# Patient Record
Sex: MALE | Race: WHITE | NOT HISPANIC OR LATINO | Employment: OTHER | URBAN - NONMETROPOLITAN AREA
[De-identification: names, ages, dates, MRNs, and addresses within clinical notes are randomized per-mention and may not be internally consistent; named-entity substitution may affect disease eponyms.]

---

## 2017-01-05 ENCOUNTER — COMMUNICATION - GICH (OUTPATIENT)
Dept: FAMILY MEDICINE | Facility: OTHER | Age: 72
End: 2017-01-05

## 2017-01-05 DIAGNOSIS — M10.9 GOUT: ICD-10-CM

## 2017-01-19 ENCOUNTER — OFFICE VISIT - GICH (OUTPATIENT)
Dept: FAMILY MEDICINE | Facility: OTHER | Age: 72
End: 2017-01-19

## 2017-01-19 ENCOUNTER — HISTORY (OUTPATIENT)
Dept: FAMILY MEDICINE | Facility: OTHER | Age: 72
End: 2017-01-19

## 2017-01-19 DIAGNOSIS — I10 ESSENTIAL (PRIMARY) HYPERTENSION: ICD-10-CM

## 2017-01-19 DIAGNOSIS — E78.00 PURE HYPERCHOLESTEROLEMIA: ICD-10-CM

## 2017-01-19 LAB
ANION GAP - HISTORICAL: 10 (ref 5–18)
BUN SERPL-MCNC: 30 MG/DL (ref 7–25)
BUN/CREAT RATIO - HISTORICAL: 21
CALCIUM SERPL-MCNC: 9.3 MG/DL (ref 8.6–10.3)
CHLORIDE SERPLBLD-SCNC: 104 MMOL/L (ref 98–107)
CHOL/HDL RATIO - HISTORICAL: 4.36
CHOLESTEROL TOTAL: 144 MG/DL
CO2 SERPL-SCNC: 26 MMOL/L (ref 21–31)
CREAT SERPL-MCNC: 1.44 MG/DL (ref 0.7–1.3)
GFR IF NOT AFRICAN AMERICAN - HISTORICAL: 48 ML/MIN/1.73M2
GLUCOSE SERPL-MCNC: 107 MG/DL (ref 70–105)
HDLC SERPL-MCNC: 33 MG/DL (ref 23–92)
LDLC SERPL CALC-MCNC: 90 MG/DL
NON-HDL CHOLESTEROL - HISTORICAL: 111 MG/DL
PATIENT STATUS - HISTORICAL: NORMAL
POTASSIUM SERPL-SCNC: 4.2 MMOL/L (ref 3.5–5.1)
SODIUM SERPL-SCNC: 140 MMOL/L (ref 133–143)
TRIGL SERPL-MCNC: 105 MG/DL

## 2017-01-19 ASSESSMENT — ANXIETY QUESTIONNAIRES
GAD7 TOTAL SCORE: 0
6. BECOMING EASILY ANNOYED OR IRRITABLE: NOT AT ALL
5. BEING SO RESTLESS THAT IT IS HARD TO SIT STILL: NOT AT ALL
1. FEELING NERVOUS, ANXIOUS, OR ON EDGE: NOT AT ALL
3. WORRYING TOO MUCH ABOUT DIFFERENT THINGS: NOT AT ALL
2. NOT BEING ABLE TO STOP OR CONTROL WORRYING: NOT AT ALL
4. TROUBLE RELAXING: NOT AT ALL
7. FEELING AFRAID AS IF SOMETHING AWFUL MIGHT HAPPEN: NOT AT ALL

## 2017-01-19 ASSESSMENT — PATIENT HEALTH QUESTIONNAIRE - PHQ9: SUM OF ALL RESPONSES TO PHQ QUESTIONS 1-9: 0

## 2017-01-23 ENCOUNTER — AMBULATORY - GICH (OUTPATIENT)
Dept: FAMILY MEDICINE | Facility: OTHER | Age: 72
End: 2017-01-23

## 2017-01-24 ENCOUNTER — COMMUNICATION - GICH (OUTPATIENT)
Dept: FAMILY MEDICINE | Facility: OTHER | Age: 72
End: 2017-01-24

## 2017-01-24 DIAGNOSIS — I10 ESSENTIAL (PRIMARY) HYPERTENSION: ICD-10-CM

## 2017-02-08 ENCOUNTER — OFFICE VISIT - GICH (OUTPATIENT)
Dept: FAMILY MEDICINE | Facility: OTHER | Age: 72
End: 2017-02-08

## 2017-02-08 ENCOUNTER — HISTORY (OUTPATIENT)
Dept: FAMILY MEDICINE | Facility: OTHER | Age: 72
End: 2017-02-08

## 2017-02-08 DIAGNOSIS — I10 ESSENTIAL (PRIMARY) HYPERTENSION: ICD-10-CM

## 2017-02-08 DIAGNOSIS — R79.89 OTHER SPECIFIED ABNORMAL FINDINGS OF BLOOD CHEMISTRY: ICD-10-CM

## 2017-02-08 DIAGNOSIS — L57.0 ACTINIC KERATOSIS: ICD-10-CM
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
## 2017-02-08 LAB
CREAT SERPL-MCNC: 1.18 MG/DL (ref 0.7–1.3)
GFR IF NOT AFRICAN AMERICAN - HISTORICAL: >60 ML/MIN/1.73M2

## 2017-03-07 ENCOUNTER — COMMUNICATION - GICH (OUTPATIENT)
Dept: FAMILY MEDICINE | Facility: OTHER | Age: 72
End: 2017-03-07

## 2017-03-07 DIAGNOSIS — K21.9 GASTRO-ESOPHAGEAL REFLUX DISEASE WITHOUT ESOPHAGITIS: ICD-10-CM

## 2017-04-14 ENCOUNTER — COMMUNICATION - GICH (OUTPATIENT)
Dept: FAMILY MEDICINE | Facility: OTHER | Age: 72
End: 2017-04-14

## 2017-04-14 DIAGNOSIS — M10.9 GOUT: ICD-10-CM

## 2017-06-14 ENCOUNTER — COMMUNICATION - GICH (OUTPATIENT)
Dept: FAMILY MEDICINE | Facility: OTHER | Age: 72
End: 2017-06-14

## 2017-06-15 ENCOUNTER — HOSPITAL ENCOUNTER (OUTPATIENT)
Dept: RADIOLOGY | Facility: OTHER | Age: 72
End: 2017-06-15
Attending: FAMILY MEDICINE

## 2017-06-15 ENCOUNTER — OFFICE VISIT - GICH (OUTPATIENT)
Dept: FAMILY MEDICINE | Facility: OTHER | Age: 72
End: 2017-06-15

## 2017-06-15 ENCOUNTER — HISTORY (OUTPATIENT)
Dept: FAMILY MEDICINE | Facility: OTHER | Age: 72
End: 2017-06-15

## 2017-06-15 DIAGNOSIS — M17.0 PRIMARY OSTEOARTHRITIS OF BOTH KNEES: ICD-10-CM

## 2017-06-15 DIAGNOSIS — R07.89 OTHER CHEST PAIN: ICD-10-CM

## 2017-06-15 ASSESSMENT — PATIENT HEALTH QUESTIONNAIRE - PHQ9: SUM OF ALL RESPONSES TO PHQ QUESTIONS 1-9: 0

## 2017-07-28 ENCOUNTER — OFFICE VISIT - GICH (OUTPATIENT)
Dept: FAMILY MEDICINE | Facility: OTHER | Age: 72
End: 2017-07-28

## 2017-07-28 ENCOUNTER — HISTORY (OUTPATIENT)
Dept: FAMILY MEDICINE | Facility: OTHER | Age: 72
End: 2017-07-28

## 2017-07-28 DIAGNOSIS — L57.0 ACTINIC KERATOSIS: ICD-10-CM

## 2017-07-28 DIAGNOSIS — E78.00 PURE HYPERCHOLESTEROLEMIA: ICD-10-CM

## 2017-09-11 ENCOUNTER — AMBULATORY - GICH (OUTPATIENT)
Dept: SCHEDULING | Facility: OTHER | Age: 72
End: 2017-09-11

## 2017-12-11 ENCOUNTER — COMMUNICATION - GICH (OUTPATIENT)
Dept: FAMILY MEDICINE | Facility: OTHER | Age: 72
End: 2017-12-11

## 2017-12-11 DIAGNOSIS — K21.9 GASTRO-ESOPHAGEAL REFLUX DISEASE WITHOUT ESOPHAGITIS: ICD-10-CM

## 2017-12-27 NOTE — PROGRESS NOTES
"Patient Information     Patient Name MRN Sex Bryan Valdovinos [id number] Male 1945      Progress Notes by Abundio Abdi MD at 2017  8:30 AM     Author:  Abundio Abdi MD Service:  (none) Author Type:  Physician     Filed:  2017 10:09 AM Encounter Date:  2017 Status:  Signed     :  Abundio Abdi MD (Physician)            SUBJECTIVE:    Bryan Kearney is a 71 y.o. male who presents for rash and refill Zocor    HPI Comments: Patient arrives here for facial rash. He recently started his Efudex. And after a few days ago became quite red and inflamed on his face. He does have a history of solar an actinic keratosis. Was wondering if he should see the dermatologist again. Also needs refill of his Zocor      No Known Allergies and   Family History       Problem   Relation Age of Onset     Cancer  Father      Lymphoma       Cancer-colon  Brother        REVIEW OF SYSTEMS:  ROS    OBJECTIVE:  /84  Temp 97.8  F (36.6  C) (Temporal)  Ht 1.8 m (5' 10.87\")  Wt 109.9 kg (242 lb 3.2 oz)  BMI 33.91 kg/m2    EXAM:   Physical Exam   Constitutional: He is well-developed, well-nourished, and in no distress.   Skin:   Especially the right face is raw. Area about 2 cm x 2 cm present.       ASSESSMENT/PLAN:    ICD-10-CM    1. Actinic keratosis L57.0    2. HYPERCHOLESTEROLEMIA E78.00 simvastatin (ZOCOR) 20 mg tablet        Plan:  Patient advised we should hold off on the Efudex follow-up in a couple weeks once it's healed. Patient may need dermatology appointment. Refill Zocor.        "

## 2017-12-28 NOTE — PROGRESS NOTES
Patient Information     Patient Name MRN Sex Bryan Valdovinos [id number] Male 1945      Progress Notes by Abundio Abdi MD at 6/15/2017  3:30 PM     Author:  Abundio Abdi MD Service:  (none) Author Type:  Physician     Filed:  2017 11:10 AM Encounter Date:  6/15/2017 Status:  Signed     :  Abundio Abdi MD (Physician)            SUBJECTIVE:    Bryan Kearney is a 71 y.o. male who presents for right-sided chest pain    HPI Comments: Patient arrives here for right-sided chest pain. States is been going on for one month. He did use a few hydrocodone which helped. Riding a lot more bumping twisting breathing all makes it worse. States it seemed to start when he was on a tractor and was turning. Does not seem to be getting better. No fevers chills. No cough.      No Known Allergies,   Family History       Problem   Relation Age of Onset     Cancer-colon  Brother      Cancer  Father      Lymphoma     ,   Current Outpatient Prescriptions on File Prior to Visit       Medication  Sig Dispense Refill     allopurinol (ZYLOPRIM) 300 mg tablet Take 1 tablet by mouth once daily. Take with plenty of water 90 tablet 2     aspirin 325 mg tablet Take 325 mg by mouth once daily with a meal.       atenolol (TENORMIN) 50 mg tablet Take 1 tablet by mouth once daily. 90 tablet 3     fluorouracil 5% topical (EFUDEX) 5 % cream Apply  topically to affected area(s) 2 times daily. 40 g 0     hydroCHLOROthiazide (HCTZ) 25 mg tablet Take 1 tablet by mouth once daily. 90 tablet 3     ranitidine (ZANTAC) 150 mg tablet Take 1 tablet by mouth 2 times daily. 180 tablet 2     simvastatin (ZOCOR) 20 mg tablet TAKE ONE TABLET ORALLY ATBEDTIME 90 tablet 2     No current facility-administered medications on file prior to visit.    ,   Past Surgical History:      Procedure  Laterality Date     APPENDECTOMY       CAROTID ENDARDECTOMY      Left carotid endarterectomy secondary to high grade stenosis       CAROTID  ENDARDECTOMY      Bilateral endarterectomy        COLONOSCOPY SCREENING  06/04/2001    Next colonoscopy due in 2011.         ROTATOR CUFF REPAIR     ,   Social History        Substance Use Topics          Smoking status:   Current Every Day Smoker      Packs/day:  0.75      Years:  11.00      Smokeless tobacco:   Never Used      Alcohol use   0.6 oz/week     1 Standard drinks or equivalent per week        Comment: 1 per week      and   Social History        Substance Use Topics          Smoking status:   Current Every Day Smoker      Packs/day:  0.75      Years:  11.00      Smokeless tobacco:   Never Used      Alcohol use   0.6 oz/week     1 Standard drinks or equivalent per week        Comment: 1 per week         REVIEW OF SYSTEMS:  ROS    OBJECTIVE:  /68  Pulse (!) 48  Wt 109.4 kg (241 lb 3.2 oz)  BMI 33.66 kg/m2    EXAM:   Physical Exam   Constitutional: He is well-developed, well-nourished, and in no distress.   Eyes: Pupils are equal, round, and reactive to light.   Cardiovascular: Normal rate, regular rhythm and normal heart sounds.    Pulmonary/Chest: Effort normal and breath sounds normal. No respiratory distress. He has no wheezes. He has no rales. He exhibits no tenderness.       ASSESSMENT/PLAN:    ICD-10-CM    1. Other chest pain R07.89 XR CHEST 2 VIEWS PA AND LATERAL   2. Primary osteoarthritis of both knees M17.0 HYDROcodone-acetaminophen, 5-325 mg, (NORCO) per tablet        Plan:  Chest pain is likely pleuritic a costochondritis in nature. Prescription for hydrocodone. Chest x-ray was done and was unremarkable. Will get back to patient if radiology sees anything concerning.

## 2017-12-28 NOTE — TELEPHONE ENCOUNTER
"Patient Information     Patient Name MRN Bryan Jaimes [id number] Male 1945      Telephone Encounter by Roz Paula RN at 2017 12:59 PM     Author:  Roz Paula RN Service:  (none) Author Type:  NURS- Registered Nurse     Filed:  2017  1:15 PM Encounter Date:  2017 Status:  Signed     :  Roz Paula RN (NURS- Registered Nurse)            Patient calls today with right-sided chest pain that is bothersome when he is twisting his upper body or \"being jostled\" while doing activities such as riding the tractor. It has been present for about 1 month. He denies nausea, dizziness, diaphoresis, radiating pain, difficulty breathing, and fever. He states he notices a dull ache at rest and the actual bothersome pain lasts only a few seconds. He was advised to be seen today or tomorrow and was transferred to scheduling.    Reason for Disposition    [1] Chest pain(s) lasting a few seconds AND [2] persists > 3 days    Answer Assessment - Initial Assessment Questions  1. LOCATION: \"Where does it hurt?\"        Right chest  2. RADIATION: \"Does the pain go anywhere else?\" (e.g., into neck, jaw, arms, back)      no  3. ONSET: \"When did the chest pain begin?\" (Minutes, hours or days)       1 month ago  4. PATTERN \"Does the pain come and go, or has it been constant since it started?\"  \"Does it get worse with exertion?\"       Constant dull ache, but actual pain when \"twisting or jostling\" ie. Riding tractor  5. DURATION: \"How long does it last\" (e.g., seconds, minutes, hours)      Constant dull ache, but pains last only a few seconds  6. SEVERITY: \"How bad is the pain?\"  (e.g., Scale 1-10; mild, moderate, or severe)     - MILD (1-3): doesn't interfere with normal activities      - MODERATE (4-7): interferes with normal activities or awakens from sleep     - SEVERE (8-10): excruciating pain, unable to do any normal activities        5/10 at worst  7. CARDIAC RISK FACTORS: " "\"Do you have any history of heart problems or risk factors for heart disease?\" (e.g., prior heart attack, angina; high blood pressure, diabetes, being overweight, high cholesterol, smoking, or strong family history of heart disease)      HTN  8. PULMONARY RISK FACTORS: \"Do you have any history of lung disease?\"  (e.g., blood clots in lung, asthma, emphysema, birth control pills)      no  9. CAUSE: \"What do you think is causing the chest pain?\"      unknown  10. OTHER SYMPTOMS: \"Do you have any other symptoms?\" (e.g., dizziness, nausea, vomiting, sweating, fever, difficulty breathing, cough)        no  11. PREGNANCY: \"Is there any chance you are pregnant?\" \"When was your last menstrual period?\"        N/a male    Protocols used: ADULT CHEST PAIN-A-AH            "

## 2017-12-30 NOTE — NURSING NOTE
Patient Information     Patient Name MRN Bryan Jaimes [id number] Male 1945      Nursing Note by Amber Mirza at 2017  8:30 AM     Author:  Amber Mirza Service:  (none) Author Type:  (none)     Filed:  2017  8:22 AM Encounter Date:  2017 Status:  Signed     :  Amber Mirza            Patient presents to the clinic with a rash on his face, arms and legs,  He's got a history of skin cancer and has been using a cream, he thinks he might have used too much or is having a reaction.  Amber Mirza LPN....................2017 8:13 AM

## 2018-01-02 NOTE — TELEPHONE ENCOUNTER
Patient Information     Patient Name MRN Sex Bryan Valdovinos [id number] Male 1945      Telephone Encounter by Roz Paula RN at 2017  4:04 PM     Author:  Roz Paula RN Service:  (none) Author Type:  NURS- Registered Nurse     Filed:  2017  4:06 PM Encounter Date:  2017 Status:  Signed     :  Roz Paula RN (NURS- Registered Nurse)            GOUT    Office visit in the past 12 months or per provider note.    Last visit with ZOE FONTANEZ was on: 2016 in [facility]  Next visit with ZOE FONTANEZ is on: No future appointment listed with this provider  Next visit with Family Practice is on: No future appointment listed in this department    Max refill for 12 months from last office visit or per provider note.    Patient is due for medication management appointment. Limited refill provided at this time and letter sent for reminder to patient. Prescription refilled per RN Medication Refill Policy.................... Roz Paula RN ....................  2017   4:05 PM

## 2018-01-03 NOTE — NURSING NOTE
Patient Information     Patient Name MRN Bryan Jaimes [id number] Male 1945      Nursing Note by Brigette Garinca at 2017  8:45 AM     Author:  Brigette Garnica Service:  (none) Author Type:  (none)     Filed:  2017  9:02 AM Encounter Date:  2017 Status:  Signed     :  Brigette Garnica            Patient here for medication review and refills on medications.  No concerns today.  Last eye exam longer than three years and dental exam 2 years ago. Brigette Garnica LPN .......................2017  8:56 AM

## 2018-01-03 NOTE — PROGRESS NOTES
Patient Information     Patient Name MRN Sex Bryan Valdovinos [id number] Male 1945      Progress Notes by Abundio Abdi MD at 2017  8:30 AM     Author:  Abundio Abdi MD Service:  (none) Author Type:  Physician     Filed:  2017  6:53 PM Encounter Date:  2017 Status:  Signed     :  Abundio Abdi MD (Physician)            SUBJECTIVE:    Bryan Kearney is a 71 y.o. male who presents for follow-up creatinine    HPI Comments: Patient arrives here for follow-up creatinine. Also requests a refill of his hydrochlorothiazide which he has not started as yet. And a refill of his Efudex. He was on lisinopril and his creatinine had bumped up. He has stopped his lisinopril and arrives here for follow-up.      No Known Allergies and   Family History       Problem   Relation Age of Onset     Cancer-colon  Brother      Cancer  Father      Lymphoma         REVIEW OF SYSTEMS:  ROS    OBJECTIVE:  /84  Pulse 52  Wt 110.6 kg (243 lb 12.8 oz)  BMI 34.02 kg/m2    EXAM:   Physical Exam   Constitutional: He is well-developed, well-nourished, and in no distress.   Pulmonary/Chest: Effort normal.   Neurological: He is alert.   Psychiatric: Affect normal.     Results for orders placed or performed in visit on 17       CREATININE       Result  Value Ref Range Status    CREATININE 1.18 0.70 - 1.30 mg/dL Final    GFR if African American >60 >60 ml/min/1.73m2 Final    GFR if not African American >60 >60 ml/min/1.73m2 Final       ASSESSMENT/PLAN:    ICD-10-CM    1. Creatinine elevation R79.89 CREATININE      CREATININE   2. HYPERTENSION I10    3. Essential hypertension I10 hydroCHLOROthiazide (HCTZ) 25 mg tablet   4. Solar keratosis L57.0 fluorouracil 5% topical (EFUDEX) 5 % cream     X32.XXXA    5. Actinic keratosis L57.0 fluorouracil 5% topical (EFUDEX) 5 % cream        Plan:  Satisfactory creatinine. Elevation likely due to his ACE inhibitor. Start the hydrochlorothiazide. Continue with blood  pressure checks. D dictated. Efudex refilled.

## 2018-01-03 NOTE — TELEPHONE ENCOUNTER
Patient Information     Patient Name MRN Sex Bryan Valdovinos [id number] Male 1945      Telephone Encounter by Roz Paula RN at 3/7/2017 10:46 AM     Author:  Roz Paula RN Service:  (none) Author Type:  NURS- Registered Nurse     Filed:  3/7/2017 10:47 AM Encounter Date:  3/7/2017 Status:  Signed     :  Roz Paula RN (NURS- Registered Nurse)            H2 Blockers    Office visit in the past 12 months or per provider note.    Last visit with ZOE FONTANEZ was on: 2017 in [facility]  Next visit with ZOE FONTANEZ is on: No future appointment listed with this provider  Next visit with Family Practice is on: No future appointment listed in this department    Max refill for 12 months from last office visit or per provider note.    Prescription refilled per RN Medication Refill Policy.................... Roz Paula RN ....................  3/7/2017   10:46 AM

## 2018-01-03 NOTE — TELEPHONE ENCOUNTER
Patient Information     Patient Name MRN Sex Bryan Valdovinos [id number] Male 1945      Telephone Encounter by Roz Paual RN at 2017  8:23 AM     Author:  Roz Paula RN Service:  (none) Author Type:  NURS- Registered Nurse     Filed:  2017  8:24 AM Encounter Date:  2017 Status:  Signed     :  Roz Paula RN (NURS- Registered Nurse)            Ace Inhibitors    Office visit in the past 12 months or per provider note.    Last visit with ZOE FONTANEZ was on: 2017 in [facility]  Next visit with ZOE FONTANEZ is on: No future appointment listed with this provider  Next visit with Family Practice is on: No future appointment listed in this department    Lab test requirements:  Creatinine and Potassium annually, if ordering lab, order BMP.  CREATININE (mg/dL)    Date Value   2017 1.44 (H)     POTASSIUM (mmol/L)    Date Value   2017 4.2       Max refill for 12 months from last office visit or per provider note    Prescription refilled per RN Medication Refill Policy.................... Roz Paula RN ....................  2017   8:24 AM

## 2018-01-03 NOTE — PROGRESS NOTES
Patient Information     Patient Name MRN Sex Bryan Valdovinos [id number] Male 1945      Progress Notes by Abundio Abdi MD at 2017  8:45 AM     Author:  Abundio Abdi MD Service:  (none) Author Type:  Physician     Filed:  2017  2:08 PM Encounter Date:  2017 Status:  Signed     :  Abundio Abdi MD (Physician)            SUBJECTIVE:    Bryan Kearney is a 71 y.o. male who presents for medication refill    HPI Comments: Patient arrives here for medication refill. States he just needs the atenolol refilled. Chart reviewed shows patient also is in need of a lipid and comp panel. I did attempt to review his other medications but patient is not sure exactly what he's taken.      No Known Allergies,   Family History       Problem   Relation Age of Onset     Cancer-colon  Brother      Cancer  Father      Lymphoma     ,   Current Outpatient Prescriptions on File Prior to Visit       Medication  Sig Dispense Refill     allopurinol (ZYLOPRIM) 300 mg tablet TAKE ONE TABLET ORALLY   ONCE A DAY *TAKE WITH    PLENTY OF WATER* 90 tablet 0     aspirin 325 mg tablet Take 325 mg by mouth once daily with a meal.       hydrochlorothiazide (HCTZ) 25 mg tablet Take 1 tablet by mouth once daily. 90 tablet 3     HYDROcodone-acetaminophen, 5-325 mg, (NORCO) per tablet Take 1 tablet by mouth every 6 hours if needed for Pain. Max acetaminophen dose: 4000mg in 24 hrs. 120 tablet 0     indomethacin (INDOCIN) 25 mg capsule Take 1-2 capsules by mouth 2 times daily with meals. Twice daily as needed 906 capsule 3     lisinopril (PRINIVIL; ZESTRIL) 5 mg tablet TAKE ONE TABLET ORALLY   ONCE A DAY 90 tablet 2     ranitidine (ZANTAC) 150 mg tablet TAKE ONE TABLET ORALLY   TWO TIMES A  tablet 2     simvastatin (ZOCOR) 20 mg tablet TAKE ONE TABLET ORALLY ATBEDTIME 90 tablet 2     No current facility-administered medications on file prior to visit.     and   Current Outpatient Prescriptions:       allopurinol (ZYLOPRIM) 300 mg tablet, TAKE ONE TABLET ORALLY   ONCE A DAY *TAKE WITH    PLENTY OF WATER*, Disp: 90 tablet, Rfl: 0     aspirin 325 mg tablet, Take 325 mg by mouth once daily with a meal., Disp: , Rfl:      atenolol (TENORMIN) 50 mg tablet, Take 1 tablet by mouth once daily., Disp: 90 tablet, Rfl: 3     hydrochlorothiazide (HCTZ) 25 mg tablet, Take 1 tablet by mouth once daily., Disp: 90 tablet, Rfl: 3     HYDROcodone-acetaminophen, 5-325 mg, (NORCO) per tablet, Take 1 tablet by mouth every 6 hours if needed for Pain. Max acetaminophen dose: 4000mg in 24 hrs., Disp: 120 tablet, Rfl: 0     indomethacin (INDOCIN) 25 mg capsule, Take 1-2 capsules by mouth 2 times daily with meals. Twice daily as needed, Disp: 906 capsule, Rfl: 3     lisinopril (PRINIVIL; ZESTRIL) 5 mg tablet, TAKE ONE TABLET ORALLY   ONCE A DAY, Disp: 90 tablet, Rfl: 2     ranitidine (ZANTAC) 150 mg tablet, TAKE ONE TABLET ORALLY   TWO TIMES A DAY, Disp: 180 tablet, Rfl: 2     simvastatin (ZOCOR) 20 mg tablet, TAKE ONE TABLET ORALLY ATBEDTIME, Disp: 90 tablet, Rfl: 2  Medications have been reviewed by me and are current to the best of my knowledge and ability.    REVIEW OF SYSTEMS:  ROS    OBJECTIVE:  /90  Pulse 52  Wt 112 kg (247 lb)  BMI 34.47 kg/m2    EXAM:   Physical Exam   Constitutional: He is well-developed, well-nourished, and in no distress.   Cardiovascular: Normal rate, regular rhythm and normal heart sounds.    No murmur heard.  Pulmonary/Chest: Effort normal and breath sounds normal.   Neurological: He is alert.       ASSESSMENT/PLAN:    ICD-10-CM    1. HYPERTENSION I10 BASIC METABOLIC PANEL      BASIC METABOLIC PANEL   2. HYPERCHOLESTEROLEMIA E78.00 LIPID PANEL      LIPID PANEL   3. Essential hypertension I10 atenolol (TENORMIN) 50 mg tablet        Plan:  Hypertension currently under good control. Medication for atenolol refilled. Review of the chart he declines colonoscopy.

## 2018-01-03 NOTE — NURSING NOTE
Patient Information     Patient Name MRN Bryan Jaimes [id number] Male 1945      Nursing Note by Brigette Garnica at 2017  8:30 AM     Author:  Brigette Garnica Service:  (none) Author Type:  (none)     Filed:  2017  8:54 AM Encounter Date:  2017 Status:  Signed     :  Brigette Garnica            Patient here for medication recheck he has had his lisinopril on hold for 2 weeks and needs to have kidneys rechecked. Brigette Garnica LPN .......................2017  8:48 AM

## 2018-01-04 NOTE — TELEPHONE ENCOUNTER
Patient Information     Patient Name MRN Bryan Jaimes [id number] Male 1945      Telephone Encounter by Roz Paula RN at 2017  7:50 AM     Author:  Roz Paula RN Service:  (none) Author Type:  NURS- Registered Nurse     Filed:  2017  7:53 AM Encounter Date:  2017 Status:  Signed     :  Roz Paula RN (NURS- Registered Nurse)            Patient had medication management 17, but this medication was not reviewed as patient was not sure what medication he was taking. Please review and sign if appropriate.    This is a Refill request from: [facility]  Name of Medication: Allopurinol  Quantity requested: 90 with 2 refills  Last fill date: 17  Due for refill: yes  Last visit with ABUNDIO ABDI was on: 2017 in [facility]  PCP:  Abundio Abdi MD  Controlled Substance Agreement:  N/a    Diagnosis r/t this medication request: Gout     Unable to complete prescription refill per RN Medication Refill Policy.................... Roz Paula RN ....................  2017   7:52 AM

## 2018-01-11 ENCOUNTER — HISTORY (OUTPATIENT)
Dept: FAMILY MEDICINE | Facility: OTHER | Age: 73
End: 2018-01-11

## 2018-01-11 ENCOUNTER — OFFICE VISIT - GICH (OUTPATIENT)
Dept: FAMILY MEDICINE | Facility: OTHER | Age: 73
End: 2018-01-11

## 2018-01-11 DIAGNOSIS — M10.9 GOUT: ICD-10-CM

## 2018-01-11 DIAGNOSIS — L57.0 ACTINIC KERATOSIS: ICD-10-CM

## 2018-01-11 DIAGNOSIS — K21.9 GASTRO-ESOPHAGEAL REFLUX DISEASE WITHOUT ESOPHAGITIS: ICD-10-CM

## 2018-01-11 DIAGNOSIS — M17.0 PRIMARY OSTEOARTHRITIS OF BOTH KNEES: ICD-10-CM

## 2018-01-11 DIAGNOSIS — E78.00 PURE HYPERCHOLESTEROLEMIA: ICD-10-CM

## 2018-01-11 DIAGNOSIS — I10 ESSENTIAL (PRIMARY) HYPERTENSION: ICD-10-CM
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
## 2018-01-11 LAB
ANION GAP - HISTORICAL: 8 (ref 5–18)
BUN SERPL-MCNC: 24 MG/DL (ref 7–25)
BUN/CREAT RATIO - HISTORICAL: 23
CALCIUM SERPL-MCNC: 9 MG/DL (ref 8.6–10.3)
CHLORIDE SERPLBLD-SCNC: 106 MMOL/L (ref 98–107)
CHOL/HDL RATIO - HISTORICAL: 4
CHOLESTEROL TOTAL: 116 MG/DL
CO2 SERPL-SCNC: 27 MMOL/L (ref 21–31)
CREAT SERPL-MCNC: 1.06 MG/DL (ref 0.7–1.3)
GFR IF NOT AFRICAN AMERICAN - HISTORICAL: >60 ML/MIN/1.73M2
GLUCOSE SERPL-MCNC: 101 MG/DL (ref 70–105)
HDLC SERPL-MCNC: 29 MG/DL (ref 23–92)
LDLC SERPL CALC-MCNC: 67 MG/DL
NON-HDL CHOLESTEROL - HISTORICAL: 87 MG/DL
POTASSIUM SERPL-SCNC: 3.9 MMOL/L (ref 3.5–5.1)
PROVIDER ORDERDED STATUS - HISTORICAL: NORMAL
SODIUM SERPL-SCNC: 141 MMOL/L (ref 133–143)
TRIGL SERPL-MCNC: 98 MG/DL

## 2018-01-11 ASSESSMENT — ANXIETY QUESTIONNAIRES
5. BEING SO RESTLESS THAT IT IS HARD TO SIT STILL: NOT AT ALL
1. FEELING NERVOUS, ANXIOUS, OR ON EDGE: NOT AT ALL
GAD7 TOTAL SCORE: 0
7. FEELING AFRAID AS IF SOMETHING AWFUL MIGHT HAPPEN: NOT AT ALL
3. WORRYING TOO MUCH ABOUT DIFFERENT THINGS: NOT AT ALL
2. NOT BEING ABLE TO STOP OR CONTROL WORRYING: NOT AT ALL
4. TROUBLE RELAXING: NOT AT ALL
6. BECOMING EASILY ANNOYED OR IRRITABLE: NOT AT ALL

## 2018-01-11 ASSESSMENT — PATIENT HEALTH QUESTIONNAIRE - PHQ9: SUM OF ALL RESPONSES TO PHQ QUESTIONS 1-9: 0

## 2018-01-26 VITALS
TEMPERATURE: 97.8 F | WEIGHT: 242.2 LBS | DIASTOLIC BLOOD PRESSURE: 84 MMHG | SYSTOLIC BLOOD PRESSURE: 136 MMHG | HEIGHT: 71 IN | BODY MASS INDEX: 33.91 KG/M2

## 2018-01-26 VITALS
HEART RATE: 48 BPM | DIASTOLIC BLOOD PRESSURE: 68 MMHG | SYSTOLIC BLOOD PRESSURE: 120 MMHG | BODY MASS INDEX: 33.66 KG/M2 | WEIGHT: 241.2 LBS

## 2018-01-26 VITALS
DIASTOLIC BLOOD PRESSURE: 90 MMHG | SYSTOLIC BLOOD PRESSURE: 150 MMHG | SYSTOLIC BLOOD PRESSURE: 130 MMHG | HEART RATE: 52 BPM | WEIGHT: 243.8 LBS | BODY MASS INDEX: 34.47 KG/M2 | WEIGHT: 247 LBS | HEART RATE: 52 BPM | DIASTOLIC BLOOD PRESSURE: 84 MMHG | BODY MASS INDEX: 34.02 KG/M2

## 2018-02-04 ASSESSMENT — PATIENT HEALTH QUESTIONNAIRE - PHQ9
SUM OF ALL RESPONSES TO PHQ QUESTIONS 1-9: 0
SUM OF ALL RESPONSES TO PHQ QUESTIONS 1-9: 0

## 2018-02-04 ASSESSMENT — ANXIETY QUESTIONNAIRES: GAD7 TOTAL SCORE: 0

## 2018-02-09 VITALS
SYSTOLIC BLOOD PRESSURE: 136 MMHG | HEART RATE: 56 BPM | BODY MASS INDEX: 34.16 KG/M2 | DIASTOLIC BLOOD PRESSURE: 78 MMHG | WEIGHT: 244 LBS | HEIGHT: 71 IN

## 2018-02-11 ASSESSMENT — ANXIETY QUESTIONNAIRES: GAD7 TOTAL SCORE: 0

## 2018-02-11 ASSESSMENT — PATIENT HEALTH QUESTIONNAIRE - PHQ9: SUM OF ALL RESPONSES TO PHQ QUESTIONS 1-9: 0

## 2018-02-12 NOTE — PROGRESS NOTES
Patient Information     Patient Name MRN Sex Bryan Valdovinos [id number] Male 1945      Progress Notes by Abundio Abdi MD at 2018  8:30 AM     Author:  Abundio Abdi MD Service:  (none) Author Type:  Physician     Filed:  2018  1:02 PM Encounter Date:  2018 Status:  Signed     :  Abundio Abdi MD (Physician)            SUBJECTIVE:    Bryan Kearney is a 72 y.o. male who presents for annual medication renewal    HPI Comments: Patient arrives here requesting renewal of his hydrocodone Zocor atenolol Efudex Zantac in allopurinol. He is in need of a lipid panel and laboratory. States that he uses the hydrocodone on a when necessary basis. Approximate one to 2 a week. Patient is in need of a narcotic contract which was done. Otherwise tolerating the medications well. He is one flare of gout per year. The Zantac is controlling his GERD. Blood pressures under good control with his current medications of atenolol and hydrochlorothiazide.      No Known Allergies,   Family History       Problem   Relation Age of Onset     Cancer  Father      Lymphoma       Cancer-colon  Brother    ,   Current Outpatient Prescriptions on File Prior to Visit       Medication  Sig Dispense Refill     aspirin 325 mg tablet Take 325 mg by mouth once daily with a meal.       No current facility-administered medications on file prior to visit.    ,   Patient Active Problem List     Diagnosis  Code     HYPERTENSION I10     DEGENERATIVE JOINT DISEASE, KNEES, BILATERAL M17.10     COLONIC POLYPS, HYPERPLASTIC D12.6     HEARTBURN R12     HEARING LOSS H91.90     GANGLION CYST, WRIST, LEFT M67.40     GOUT M10.9     G E R D K21.9     HYPERCHOLESTEROLEMIA E78.00     COLONOSCOPY, HX OF  Z87.19     Colonoscopy refused Z53.20     Solar keratosis L57.0, X32.XXXA     Actinic keratosis L57.0   ,   Social History        Substance Use Topics          Smoking status:   Current Every Day Smoker      Packs/day:  0.75   "    Years:  11.00      Smokeless tobacco:   Never Used      Alcohol use   0.6 oz/week     1 Standard drinks or equivalent per week        Comment: 1 per week      and   Social History        Substance Use Topics          Smoking status:   Current Every Day Smoker      Packs/day:  0.75      Years:  11.00      Smokeless tobacco:   Never Used      Alcohol use   0.6 oz/week     1 Standard drinks or equivalent per week        Comment: 1 per week         REVIEW OF SYSTEMS:  Review of Systems   Constitutional: Negative for chills and fever.   Eyes: Negative for blurred vision.   Cardiovascular: Negative for chest pain and palpitations.   Gastrointestinal: Negative for heartburn, nausea and vomiting.   Genitourinary: Positive for frequency and urgency.   Musculoskeletal: Positive for back pain, myalgias and neck pain.   Neurological: Negative for dizziness.   Psychiatric/Behavioral: Negative for depression and suicidal ideas.       OBJECTIVE:  /78 (Cuff Site: Right Arm, Position: Sitting, Cuff Size: Adult Large)  Pulse 56  Ht 1.791 m (5' 10.5\")  Wt 110.7 kg (244 lb)  BMI 34.52 kg/m2    EXAM:   Physical Exam  Results for orders placed or performed in visit on 01/11/18       LIPID PANEL       Result  Value Ref Range Status    CHOLESTEROL,TOTAL 116 <200 mg/dL Final    TRIGLYCERIDES 98 <150 mg/dL Final    HDL CHOLESTEROL 29 23 - 92 mg/dL Final    NON-HDL CHOLESTEROL 87 <145 mg/dl Final    CHOL/HDL RATIO            4.00 <4.50                 Final    LDL CHOLESTEROL 67 <100 mg/dL Final    PROVIDER ORDERED STATUS FASTING  Final   BASIC METABOLIC PANEL       Result  Value Ref Range Status    SODIUM 141 133 - 143 mmol/L Final    POTASSIUM 3.9 3.5 - 5.1 mmol/L Final    CHLORIDE 106 98 - 107 mmol/L Final    CO2,TOTAL 27 21 - 31 mmol/L Final    ANION GAP 8 5 - 18                 Final    GLUCOSE 101 70 - 105 mg/dL Final    CALCIUM 9.0 8.6 - 10.3 mg/dL Final    BUN 24 7 - 25 mg/dL Final    CREATININE 1.06 0.70 - 1.30 mg/dL " Final    BUN/CREAT RATIO           23                 Final    GFR if African American >60 >60 ml/min/1.73m2 Final    GFR if not African American >60 >60 ml/min/1.73m2 Final       ASSESSMENT/PLAN:    ICD-10-CM    1. Primary osteoarthritis of both knees M17.0 HYDROcodone-acetaminophen, 5-325 mg, (NORCO) per tablet   2. HYPERCHOLESTEROLEMIA E78.00 simvastatin (ZOCOR) 20 mg tablet      LIPID PANEL      LIPID PANEL   3. Essential hypertension I10 atenolol (TENORMIN) 50 mg tablet      hydroCHLOROthiazide (HCTZ) 25 mg tablet      BASIC METABOLIC PANEL      BASIC METABOLIC PANEL   4. Solar keratosis L57.0 fluorouracil 5% topical (EFUDEX) 5 % cream     X32.XXXA    5. Actinic keratosis L57.0 fluorouracil 5% topical (EFUDEX) 5 % cream   6. Gastroesophageal reflux disease, esophagitis presence not specified K21.9 ranitidine (ZANTAC) 150 mg tablet   7. 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 Medications refilled. Laboratories appropriate. Follow-up on an annual basis. His hydrocodone is being use sparingly and I encouraged him to continue use it sparingly. He states he only uses it if he should be very active such as cutting wood.

## 2018-02-12 NOTE — TELEPHONE ENCOUNTER
Patient Information     Patient Name MRN Bryan Jaimes [id number] Male 1945      Telephone Encounter by Frieda Hobbs RN at 2017  3:19 PM     Author:  Frieda Hobbs RN Service:  (none) Author Type:  NURS- Registered Nurse     Filed:  2017  3:27 PM Encounter Date:  2017 Status:  Signed     :  Frieda Hobbs RN (NURS- Registered Nurse)            Patient will be due for medication management appointment. This medication last reviewed on 2017. Patient has no upcoming appointments.     H2 Blockers    Office visit in the past 12 months or per provider note.    Last visit with ZOE FONTANEZ was on: 2017 in [facility]  Next visit with ZOE FONTANEZ is on: No future appointment listed with this provider  Next visit with Family Practice is on: No future appointment listed in this department    Max refill for 12 months from last office visit or per provider note.    Patient is due for medication management appointment. Limited refill provided at this time. [employer] message and/or letter sent for reminder to patient. Prescription refilled per RN Medication Refill Policy.................... Frieda Hobbs RN ....................  2017   3:26 PM

## 2018-02-12 NOTE — NURSING NOTE
Patient Information     Patient Name MRN Bryan Jaimes [id number] Male 1945      Nursing Note by Brigette Garnica at 2018  8:30 AM     Author:  Brigette Garnica Service:  (none) Author Type:  (none)     Filed:  2018  8:37 AM Encounter Date:  2018 Status:  Signed     :  Brigette Garnica            Patient here for annual medication review and refills. Last eye exam about 2 years ago, and last dental exam 4 years ago. Brigette Garnica LPN .......................2018  8:37 AM'

## 2018-06-11 ENCOUNTER — TRANSFERRED RECORDS (OUTPATIENT)
Dept: HEALTH INFORMATION MANAGEMENT | Facility: OTHER | Age: 73
End: 2018-06-11

## 2018-07-23 NOTE — PROGRESS NOTES
Patient Information     Patient Name  Bryan Kearney MRN  [id number] Sex  Male   1945      Letter by Abundio Abdi MD at      Author:  Abundio Abdi MD Service:  (none) Author Type:  (none)    Filed:   Encounter Date:  2017 Status:  (Other)           Bryan Kearney  [address] [place] [address]          2017    Dear Mr. Kearney:    This letter is to remind you that you are due for your annual exam with Abundio Abdi MD. Your last comprehensive visit was more than 12 months ago.    A LIMITED refill of allopurinol (ZYLOPRIM) 300 mg tablet has been called into your pharmacy. Additional refills require you to complete this appointment.  Please call the clinic at [phone] to schedule your appointment.    Thank you for choosing [facility] and [facility] for your health care needs.    Sincerely,    Refill RN  [facility]

## 2018-07-23 NOTE — PROGRESS NOTES
Patient Information     Patient Name  Bryan Kearney MRN  [id number] Sex  Male   1945      Letter by Abundio Abdi MD at      Author:  Aubndio Abdi MD Service:  (none) Author Type:  (none)    Filed:   Encounter Date:  2018 Status:  (Other)           Bryan Kearney  [address] [place] [address]          2018    Dear Mr. Kearney:    Enclosed are copies of your laboratory testing which did come back satisfactory. Please call if you should have any questions.  Results for orders placed or performed in visit on 18       LIPID PANEL       Result  Value Ref Range Status    CHOLESTEROL,TOTAL 116 <200 mg/dL Final    TRIGLYCERIDES 98 <150 mg/dL Final    HDL CHOLESTEROL 29 23 - 92 mg/dL Final    NON-HDL CHOLESTEROL 87 <145 mg/dl Final    CHOL/HDL RATIO            4.00 <4.50                 Final    LDL CHOLESTEROL 67 <100 mg/dL Final    PROVIDER ORDERED STATUS FASTING  Final   BASIC METABOLIC PANEL       Result  Value Ref Range Status    SODIUM 141 133 - 143 mmol/L Final    POTASSIUM 3.9 3.5 - 5.1 mmol/L Final    CHLORIDE 106 98 - 107 mmol/L Final    CO2,TOTAL 27 21 - 31 mmol/L Final    ANION GAP 8 5 - 18                 Final    GLUCOSE 101 70 - 105 mg/dL Final    CALCIUM 9.0 8.6 - 10.3 mg/dL Final    BUN 24 7 - 25 mg/dL Final    CREATININE 1.06 0.70 - 1.30 mg/dL Final    BUN/CREAT RATIO           23                 Final    GFR if African American >60 >60 ml/min/1.73m2 Final    GFR if not African American >60 >60 ml/min/1.73m2 Final     Abundio Abdi MD ....................  2018   9:16 AM

## 2018-07-23 NOTE — PROGRESS NOTES
Patient Information     Patient Name  Bryan Kearney MRN  [id number] Sex  Male   1945      Letter by Abundio Abdi MD at      Author:  Abundio Abdi MD Service:  (none) Author Type:  (none)    Filed:   Encounter Date:  2017 Status:  (Other)           Bryan Kearney  [address] [place] [address]          2017    Dear Mr. Kearney:    This letter is to remind you that you are due for your annual exam with Abundio Abdi MD. Your last comprehensive visit was more than 12 months ago.    A LIMITED refill of         ranitidine (ZANTAC) 150 mg tablet     has been called into your pharmacy. Additional refills require you to complete this appointment.    Please call the clinic at [phone] to schedule your appointment.    If you should require additional refills before your scheduled appointment, please contact your pharmacy and we will refill your medication until the date of your appointment.    If you are no longer seeing Abundio Abdi MD for primary care, please call to let us know. Doing so will remove you from our call/contact list.      Thank you for choosing [facility] and [facility] for your health care needs.    Sincerely,    Refill RN  [facility]

## 2018-07-23 NOTE — PROGRESS NOTES
Patient Information     Patient Name  Bryan Kearney MRN  [id number] Sex  Male   1945      Letter by Abundio Abdi MD at      Author:  Abundio Abdi MD Service:  (none) Author Type:  (none)    Filed:   Encounter Date:  2017 Status:  (Other)           Bryan Kearney  [address] [place] [address]          2017    Dear Mr. Kearney:    Enclosed is a copy of your creatinine which did come back satisfactory. It is in the normal range currently. Please copy should have any questions.  Results for orders placed or performed in visit on 17       CREATININE       Result  Value Ref Range Status    CREATININE 1.18 0.70 - 1.30 mg/dL Final    GFR if African American >60 >60 ml/min/1.73m2 Final    GFR if not African American >60 >60 ml/min/1.73m2 Final     Abundio Abdi MD ....................  2017   6:51 PM

## 2018-07-24 NOTE — PROGRESS NOTES
Patient Information     Patient Name  Bryan Kearney MRN  [id number] Sex  Male   1945      Letter by Abundio Abdi MD at      Author:  Abundio Abdi MD Service:  (none) Author Type:  (none)    Filed:   Encounter Date:  2017 Status:  (Other)           Bryan Kearney  [address] [place] [address]          2017    Dear Mr. Kearney:    Enclosed is a copy of your laboratory testing. As you can see her creatinine has gone up slightly which could be related to the Indocin. I would recommend stopping the Indocin and following up in clinic 2 weeks after you discontinue it. I feel that the Indocin might be affecting your kidneys and a detrimental way. We can discuss further therapy at your clinic visit.  Results for orders placed or performed in visit on 17       BASIC METABOLIC PANEL       Result  Value Ref Range Status    SODIUM 140 133 - 143 mmol/L Final    POTASSIUM 4.2 3.5 - 5.1 mmol/L Final    CHLORIDE 104 98 - 107 mmol/L Final    CO2,TOTAL 26 21 - 31 mmol/L Final    ANION GAP 10 5 - 18                 Final    GLUCOSE 107 (H) 70 - 105 mg/dL Final    CALCIUM 9.3 8.6 - 10.3 mg/dL Final    BUN 30 (H) 7 - 25 mg/dL Final    CREATININE 1.44 (H) 0.70 - 1.30 mg/dL Final    BUN/CREAT RATIO           21                 Final    GFR if African American 58 (L) >60 ml/min/1.73m2 Final    GFR if not  48 (L) >60 ml/min/1.73m2 Final   LIPID PANEL       Result  Value Ref Range Status    CHOLESTEROL,TOTAL 144 <200 mg/dL Final    TRIGLYCERIDES 105 <150 mg/dL Final    HDL CHOLESTEROL 33 23 - 92 mg/dL Final    NON-HDL CHOLESTEROL 111 <145 mg/dl Final    CHOL/HDL RATIO            4.36 <4.50                 Final    LDL CHOLESTEROL 90 <100 mg/dL Final    PATIENT STATUS            NOT GIVEN                 Final     Abundio Abdi MD ....................  2017   12:17 PM

## 2018-09-05 ENCOUNTER — OFFICE VISIT (OUTPATIENT)
Dept: FAMILY MEDICINE | Facility: OTHER | Age: 73
End: 2018-09-05
Attending: FAMILY MEDICINE
Payer: COMMERCIAL

## 2018-09-05 VITALS
HEART RATE: 60 BPM | DIASTOLIC BLOOD PRESSURE: 78 MMHG | WEIGHT: 237.2 LBS | BODY MASS INDEX: 33.55 KG/M2 | SYSTOLIC BLOOD PRESSURE: 122 MMHG

## 2018-09-05 DIAGNOSIS — E78.00 HYPERCHOLESTEROLEMIA: ICD-10-CM

## 2018-09-05 DIAGNOSIS — M47.812 DEGENERATIVE JOINT DISEASE OF CERVICAL AND LUMBAR SPINE: ICD-10-CM

## 2018-09-05 DIAGNOSIS — M10.00 IDIOPATHIC GOUT, UNSPECIFIED CHRONICITY, UNSPECIFIED SITE: ICD-10-CM

## 2018-09-05 DIAGNOSIS — M47.816 DEGENERATIVE JOINT DISEASE OF CERVICAL AND LUMBAR SPINE: ICD-10-CM

## 2018-09-05 DIAGNOSIS — K21.9 GASTROESOPHAGEAL REFLUX DISEASE WITHOUT ESOPHAGITIS: ICD-10-CM

## 2018-09-05 DIAGNOSIS — I10 ESSENTIAL HYPERTENSION: Primary | ICD-10-CM

## 2018-09-05 PROCEDURE — G0463 HOSPITAL OUTPT CLINIC VISIT: HCPCS

## 2018-09-05 PROCEDURE — 99214 OFFICE O/P EST MOD 30 MIN: CPT | Performed by: FAMILY MEDICINE

## 2018-09-05 RX ORDER — HYDROCHLOROTHIAZIDE 25 MG/1
25 TABLET ORAL DAILY
COMMUNITY
Start: 2018-01-11 | End: 2018-09-05

## 2018-09-05 RX ORDER — HYDROCHLOROTHIAZIDE 25 MG/1
25 TABLET ORAL DAILY
Qty: 90 TABLET | Refills: 3 | Status: SHIPPED | OUTPATIENT
Start: 2018-09-05 | End: 2019-10-08

## 2018-09-05 RX ORDER — SIMVASTATIN 20 MG
20 TABLET ORAL DAILY
COMMUNITY
Start: 2018-01-11 | End: 2018-09-05

## 2018-09-05 RX ORDER — SIMVASTATIN 20 MG
20 TABLET ORAL DAILY
Qty: 90 TABLET | Refills: 3 | Status: SHIPPED | OUTPATIENT
Start: 2018-09-05 | End: 2019-09-26

## 2018-09-05 RX ORDER — ATENOLOL 50 MG/1
50 TABLET ORAL DAILY
Qty: 90 TABLET | Refills: 3 | Status: SHIPPED | OUTPATIENT
Start: 2018-09-05 | End: 2019-10-08

## 2018-09-05 RX ORDER — HYDROCODONE BITARTRATE AND ACETAMINOPHEN 5; 325 MG/1; MG/1
1 TABLET ORAL EVERY 6 HOURS PRN
COMMUNITY
Start: 2018-01-11 | End: 2018-09-05

## 2018-09-05 RX ORDER — ATENOLOL 50 MG/1
50 TABLET ORAL DAILY
COMMUNITY
Start: 2018-01-11 | End: 2018-09-05

## 2018-09-05 RX ORDER — ALLOPURINOL 300 MG/1
1 TABLET ORAL DAILY
Refills: 3 | COMMUNITY
Start: 2018-06-25 | End: 2018-09-12

## 2018-09-05 RX ORDER — ASPIRIN 325 MG
325 TABLET ORAL DAILY
COMMUNITY

## 2018-09-05 RX ORDER — HYDROCODONE BITARTRATE AND ACETAMINOPHEN 5; 325 MG/1; MG/1
1 TABLET ORAL EVERY 6 HOURS PRN
Qty: 120 TABLET | Refills: 0 | Status: SHIPPED | OUTPATIENT
Start: 2018-09-05 | End: 2019-03-01

## 2018-09-05 RX ORDER — ALLOPURINOL 100 MG/1
100 TABLET ORAL DAILY
Qty: 90 TABLET | Refills: 3 | Status: SHIPPED | OUTPATIENT
Start: 2018-09-05 | End: 2018-10-10

## 2018-09-05 ASSESSMENT — PAIN SCALES - GENERAL: PAINLEVEL: MODERATE PAIN (5)

## 2018-09-05 NOTE — MR AVS SNAPSHOT
After Visit Summary   9/5/2018    Bryan Kearney    MRN: [id number]           Patient Information     Date Of Birth          1945        Visit Information        Provider Department      9/5/2018 1:15 PM Abundio Abdi MD [facility]        Today's Diagnoses     Essential hypertension    -  1    Degenerative joint disease of cervical and lumbar spine        Gastroesophageal reflux disease without esophagitis        Hypercholesterolemia        Idiopathic gout, unspecified chronicity, unspecified site           Follow-ups after your visit        Your next 10 appointments already scheduled     Sep 12, 2018  9:00 AM CDT   SHORT with Abundio Abdi MD   [facility]    [address]  [place] [address] [phone]              Who to contact     If you have questions or need follow up information about today's clinic visit or your schedule please contact [facility] directly at [phone].  Normal or non-critical lab and imaging results will be communicated to you by MyChart, letter or phone within 4 business days after the clinic has received the results. If you do not hear from us within 7 days, please contact the clinic through MyChart or phone. If you have a critical or abnormal lab result, we will notify you by phone as soon as possible.  Submit refill requests through [employer] or call your pharmacy and they will forward the refill request to us. Please allow 3 business days for your refill to be completed.          Additional Information About Your Visit        Care EveryWhere ID     This is your Care EveryWhere ID. This could be used by other organizations to access your [facility] medical records  [id number]        Your Vitals Were     Pulse BMI (Body Mass Index)                60 33.55 kg/m2           Blood Pressure from Last 3 Encounters:   09/05/18 122/78   01/11/18 136/78    07/28/17 136/84    Weight from Last 3 Encounters:   09/05/18 237 lb 3.2 oz (107.6 kg)   01/11/18 244 lb (110.7 kg)   07/28/17 242 lb 3.2 oz (109.9 kg)              Today, you had the following     No orders found for display         Today's Medication Changes          These changes are accurate as of 9/5/18 11:59 PM.  If you have any questions, ask your nurse or doctor.               These medicines have changed or have updated prescriptions.        Dose/Directions    * allopurinol 300 MG tablet   Commonly known as:  ZYLOPRIM   This may have changed:  Another medication with the same name was added. Make sure you understand how and when to take each.   Changed by:  Abundio Abdi MD        Dose:  1 tablet   Take 1 tablet by mouth daily   Refills:  3       * allopurinol 100 MG tablet   Commonly known as:  ZYLOPRIM   This may have changed:  You were already taking a medication with the same name, and this prescription was added. Make sure you understand how and when to take each.   Used for:  Idiopathic gout, unspecified chronicity, unspecified site   Changed by:  Abundio Abdi MD        Dose:  100 mg   Take 1 tablet (100 mg) by mouth daily   Quantity:  90 tablet   Refills:  3       * Notice:  This list has 2 medication(s) that are the same as other medications prescribed for you. Read the directions carefully, and ask your doctor or other care provider to review them with you.         Where to get your medicines      These medications were sent to [facility] - [place] [address]  [address], [place] [address]     Phone:  [phone]     allopurinol 100 MG tablet    atenolol 50 MG tablet    hydrochlorothiazide 25 MG tablet    ranitidine 150 MG tablet    simvastatin 20 MG tablet         Some of these will need a paper prescription and others can be bought over the counter.  Ask your nurse if you have questions.     Bring a paper prescription for each of these medications      HYDROcodone-acetaminophen 5-325 MG per tablet               Information about OPIOIDS     PRESCRIPTION OPIOIDS: WHAT YOU NEED TO KNOW   We gave you an opioid (narcotic) pain medicine. It is important to manage your pain, but opioids are not always the best choice. You should first try all the other options your care team gave you. Take this medicine for as short a time (and as few doses) as possible.    Some activities can increase your pain, such as bandage changes or therapy sessions. It may help to take your pain medicine 30 to 60 minutes before these activities. Reduce your stress by getting enough sleep, working on hobbies you enjoy and practicing relaxation or meditation. Talk to your care team about ways to manage your pain beyond prescription opioids.    These medicines have risks:    DO NOT drive when on new or higher doses of pain medicine. These medicines can affect your alertness and reaction times, and you could be arrested for driving under the influence (DUI). If you need to use opioids long-term, talk to your care team about driving.    DO NOT operate heavy machinery    DO NOT do any other dangerous activities while taking these medicines.    DO NOT drink any alcohol while taking these medicines.     If the opioid prescribed includes acetaminophen, DO NOT take with any other medicines that contain acetaminophen. Read all labels carefully. Look for the word  acetaminophen  or  Tylenol.  Ask your pharmacist if you have questions or are unsure.    You can get addicted to pain medicines, especially if you have a history of addiction (chemical, alcohol or substance dependence). Talk to your care team about ways to reduce this risk.    All opioids tend to cause constipation. Drink plenty of water and eat foods that have a lot of fiber, such as fruits, vegetables, prune juice, apple juice and high-fiber cereal. Take a laxative (Miralax, milk of magnesia, Colace, Senna) if you don t move your bowels at least  every other day. Other side effects include upset stomach, sleepiness, dizziness, throwing up, tolerance (needing more of the medicine to have the same effect), physical dependence and slowed breathing.    Store your pills in a secure place, locked if possible. We will not replace any lost or stolen medicine. If you don t finish your medicine, please throw away (dispose) as directed by your pharmacist. The [place] Pollution [employer] Agency has more information about safe disposal: [email or web address]-medications         Primary Care Provider Office Phone # Fax #    Abundio Abdi [phone] [address] [facility] [address]        Equal Access to Services     BRANDI ASHRAF : Luis Alberto Arshad, wamarkos mariee, qarandalta kaalmaolimpia brown, mo dumont . So [facility] [phone].    ATENCIÓN: Si habla español, tiene a skinner disposición servicios gratuitos de asistencia lingüística. [facility] [phone].    We comply with applicable federal civil rights laws and [place] laws. We do not discriminate on the basis of race, color, national origin, age, disability, sex, sexual orientation, or gender identity.            Thank you!     Thank you for choosing [facility]  for your care. Our goal is always to provide you with excellent care. Hearing back from our patients is one way we can continue to improve our services. Please take a few minutes to complete the written survey that you may receive in the mail after your visit with us. Thank you!             Your Updated Medication List - Protect others around you: Learn how to safely use, store and throw away your medicines at www.disposemymeds.org.          This list is accurate as of 9/5/18 11:59 PM.  Always use your most recent med list.                   Brand Name Dispense Instructions for use Diagnosis    * allopurinol 300 MG tablet     ZYLOPRIM     Take 1 tablet by mouth daily        * allopurinol 100 MG tablet    ZYLOPRIM    90 tablet    Take 1 tablet (100 mg) by mouth daily    Idiopathic gout, unspecified chronicity, unspecified site       aspirin 325 MG tablet      Take 325 mg by mouth daily        atenolol 50 MG tablet    TENORMIN    90 tablet    Take 1 tablet (50 mg) by mouth daily    Essential hypertension       hydrochlorothiazide 25 MG tablet    HYDRODIURIL    90 tablet    Take 1 tablet (25 mg) by mouth daily    Essential hypertension       HYDROcodone-acetaminophen 5-325 MG per tablet    NORCO    120 tablet    Take 1 tablet by mouth every 6 hours as needed    Degenerative joint disease of cervical and lumbar spine       ranitidine 150 MG tablet    ZANTAC    180 tablet    Take 1 tablet (150 mg) by mouth 2 times daily    Gastroesophageal reflux disease without esophagitis       simvastatin 20 MG tablet    ZOCOR    90 tablet    Take 1 tablet (20 mg) by mouth daily    Hypercholesterolemia       * Notice:  This list has 2 medication(s) that are the same as other medications prescribed for you. Read the directions carefully, and ask your doctor or other care provider to review them with you.

## 2018-09-05 NOTE — NURSING NOTE
Patient here for blood pressure check and pain medication refill. Brigette Garnica LPN .......................9/5/2018  1:06 PM

## 2018-09-06 ASSESSMENT — PATIENT HEALTH QUESTIONNAIRE - PHQ9: SUM OF ALL RESPONSES TO PHQ QUESTIONS 1-9: 0

## 2018-09-07 ASSESSMENT — ENCOUNTER SYMPTOMS
LIGHT-HEADEDNESS: 0
DIZZINESS: 0
RESPIRATORY NEGATIVE: 1
FREQUENCY: 1
CHILLS: 0
FEVER: 0
PSYCHIATRIC NEGATIVE: 1
EYES NEGATIVE: 1

## 2018-09-07 NOTE — PROGRESS NOTES
SUBJECTIVE:   Bryan Kearney is a 73 year old male who presents to clinic today for the following health issues: Pain medication refill and blood pressure follow-up    HPI      Patient Active Problem List    Diagnosis Date Noted     Esophageal reflux 09/05/2018     Priority: Medium     Degenerative joint disease of cervical and lumbar spine 09/05/2018     Priority: Medium     Actinic keratosis 11/29/2015     Priority: Medium     Solar keratosis 05/29/2015     Priority: Medium     Colonoscopy refused 07/29/2013     Priority: Medium     Overview:   Discussed and refused 07/2013       Hypercholesterolemia 05/11/2011     Priority: Medium     Gout 04/14/2010     Priority: Medium     Benign neoplasm of colon 09/18/2009     Priority: Medium     Overview:   benign       Hearing loss 09/18/2009     Priority: Medium     HTN (hypertension) 12/24/2007     Priority: Medium     Overview:   IMO Update 10/11       Past Medical History:   Diagnosis Date     Electrocution     H/o electrocution 13,800 volts     Essential (primary) hypertension     Hypertension     Gastro-esophageal reflux disease without esophagitis     G E R D      Past Surgical History:   Procedure Laterality Date     APPENDECTOMY OPEN      No Comments Provided     ARTHROSCOPY SHOULDER ROTATOR CUFF REPAIR      No Comments Provided     COLONOSCOPY      06/04/2001,Next colonoscopy due in 2011.     OTHER SURGICAL HISTORY      ,CAROTID ENDARDECTOMY,Left carotid endarterectomy secondary to high grade stenosis     OTHER SURGICAL HISTORY      ,CAROTID ENDARDECTOMY,Bilateral endarterectomy     Family History   Problem Relation Age of Onset     Cancer Father      Cancer,Lymphoma     Colon Cancer Brother      Cancer-colon     Social History     Social History Narrative    . Retired .    3 nchildren    Alcohol Use - yes  occ beer    Preload  7/26/2013     Current Outpatient Prescriptions   Medication Sig Dispense Refill     allopurinol (ZYLOPRIM)  100 MG tablet Take 1 tablet (100 mg) by mouth daily 90 tablet 3     allopurinol (ZYLOPRIM) 300 MG tablet Take 1 tablet by mouth daily  3     aspirin 325 MG tablet Take 325 mg by mouth daily       atenolol (TENORMIN) 50 MG tablet Take 1 tablet (50 mg) by mouth daily 90 tablet 3     hydrochlorothiazide (HYDRODIURIL) 25 MG tablet Take 1 tablet (25 mg) by mouth daily 90 tablet 3     HYDROcodone-acetaminophen (NORCO) 5-325 MG per tablet Take 1 tablet by mouth every 6 hours as needed 120 tablet 0     ranitidine (ZANTAC) 150 MG tablet Take 1 tablet (150 mg) by mouth 2 times daily 180 tablet 3     simvastatin (ZOCOR) 20 MG tablet Take 1 tablet (20 mg) by mouth daily 90 tablet 3     Allergies not on file    Review of Systems   Constitutional: Negative for chills and fever.   Eyes: Negative.    Respiratory: Negative.    Cardiovascular: Negative for chest pain.   Genitourinary: Positive for frequency.   Neurological: Negative for dizziness and light-headedness.   Psychiatric/Behavioral: Negative.         OBJECTIVE:     /78 (BP Location: Right arm, Patient Position: Sitting)  Pulse 60  Wt 237 lb 3.2 oz (107.6 kg)  BMI 33.55 kg/m2  Body mass index is 33.55 kg/(m^2).  Physical Exam   Constitutional: He appears well-developed.   HENT:   Head: Normocephalic.   Eyes: Pupils are equal, round, and reactive to light.   Cardiovascular: Normal rate and regular rhythm.    Pulmonary/Chest: Effort normal and breath sounds normal.   Neurological: He is alert.   Psychiatric: He has a normal mood and affect.       Diagnostic Test Results:  Results for orders placed or performed in visit on 01/11/18   Lipid Profile   Result Value Ref Range    Non-HDL Cholesterol - Historical 87 <145 mg/dL    Provider Ordered Status - Historical FASTING     LDL Cholesterol Calculated 67 <100 mg/dL    Cholesterol/HDL Ratio - Historical 4.00 <4.50    Triglycerides 98 <150 mg/dL    HDL Cholesterol 29 23 - 92 mg/dL    Cholesterol Total 116 <200 mg/dL    Basic metabolic panel   Result Value Ref Range    Calcium 9.0 8.6 - 10.3 mg/dL    Anion Gap - Historical 8 5 - 18    GFR Estimate If Black >60 >60 ml/min/1.73m2    Chloride 106 98 - 107 mmol/L    Potassium 3.9 3.5 - 5.1 mmol/L    Sodium 141 133 - 143 mmol/L    Creatinine 1.06 0.70 - 1.30 mg/dL    Glucose 101 70 - 105 mg/dL    Carbon Dioxide 27 21 - 31 mmol/L    BUN/Creatinine Ratio - Historical 23     GFR If Not  - Historical >60 >60 ml/min/1.73m2    Urea Nitrogen 24 7 - 25 mg/dL       ASSESSMENT/PLAN:         1. Essential hypertension  Refill medication.  Currently hypertension is under good control.  - atenolol (TENORMIN) 50 MG tablet; Take 1 tablet (50 mg) by mouth daily  Dispense: 90 tablet; Refill: 3  - hydrochlorothiazide (HYDRODIURIL) 25 MG tablet; Take 1 tablet (25 mg) by mouth daily  Dispense: 90 tablet; Refill: 3    2. Degenerative joint disease of cervical and lumbar spine  Currently doing well on the hydrocodone.  He is using approximately 120 per 9 months.  Medication refilled.  - HYDROcodone-acetaminophen (NORCO) 5-325 MG per tablet; Take 1 tablet by mouth every 6 hours as needed  Dispense: 120 tablet; Refill: 0    3. Gastroesophageal reflux disease without esophagitis  Refill  - ranitidine (ZANTAC) 150 MG tablet; Take 1 tablet (150 mg) by mouth 2 times daily  Dispense: 180 tablet; Refill: 3    4. Hypercholesterolemia  Refill  - simvastatin (ZOCOR) 20 MG tablet; Take 1 tablet (20 mg) by mouth daily  Dispense: 90 tablet; Refill: 3    5. Idiopathic gout, unspecified chronicity, unspecified site  Refill   - allopurinol (ZYLOPRIM) 100 MG tablet; Take 1 tablet (100 mg) by mouth daily  Dispense: 90 tablet; Refill: 3      Abundio Abdi MD  [facility]

## 2018-09-12 ENCOUNTER — OFFICE VISIT (OUTPATIENT)
Dept: FAMILY MEDICINE | Facility: OTHER | Age: 73
End: 2018-09-12
Attending: FAMILY MEDICINE
Payer: COMMERCIAL

## 2018-09-12 VITALS
SYSTOLIC BLOOD PRESSURE: 134 MMHG | HEART RATE: 64 BPM | DIASTOLIC BLOOD PRESSURE: 84 MMHG | BODY MASS INDEX: 33.61 KG/M2 | WEIGHT: 237.6 LBS

## 2018-09-12 DIAGNOSIS — I10 ESSENTIAL HYPERTENSION: Primary | ICD-10-CM

## 2018-09-12 PROCEDURE — 99213 OFFICE O/P EST LOW 20 MIN: CPT | Performed by: FAMILY MEDICINE

## 2018-09-12 PROCEDURE — G0463 HOSPITAL OUTPT CLINIC VISIT: HCPCS

## 2018-09-12 NOTE — PROGRESS NOTES
SUBJECTIVE:   Bryan Kearney is a 73 year old male who presents to clinic today for the following health issues: Follow blood pressure    HPI Comments: Patient arrives here for follow-up blood pressure.  And refills.  He also reports his left toe is improving but does get sore after staying on it for some time.    Hypertension         Patient Active Problem List    Diagnosis Date Noted     Esophageal reflux 09/05/2018     Priority: Medium     Degenerative joint disease of cervical and lumbar spine 09/05/2018     Priority: Medium     Actinic keratosis 11/29/2015     Priority: Medium     Solar keratosis 05/29/2015     Priority: Medium     Colonoscopy refused 07/29/2013     Priority: Medium     Overview:   Discussed and refused 07/2013       Hypercholesterolemia 05/11/2011     Priority: Medium     Gout 04/14/2010     Priority: Medium     Benign neoplasm of colon 09/18/2009     Priority: Medium     Overview:   benign       Hearing loss 09/18/2009     Priority: Medium     HTN (hypertension) 12/24/2007     Priority: Medium     Overview:   IMO Update 10/11       Past Medical History:   Diagnosis Date     Electrocution     H/o electrocution 13,800 volts     Essential (primary) hypertension     Hypertension     Gastro-esophageal reflux disease without esophagitis     G E R D      Past Surgical History:   Procedure Laterality Date     APPENDECTOMY OPEN      No Comments Provided     ARTHROSCOPY SHOULDER ROTATOR CUFF REPAIR      No Comments Provided     COLONOSCOPY      06/04/2001,Next colonoscopy due in 2011.     OTHER SURGICAL HISTORY      ,CAROTID ENDARDECTOMY,Left carotid endarterectomy secondary to high grade stenosis     OTHER SURGICAL HISTORY      ,CAROTID ENDARDECTOMY,Bilateral endarterectomy     Family History   Problem Relation Age of Onset     Cancer Father      Cancer,Lymphoma     Colon Cancer Brother      Cancer-colon     No Known Allergies    Review of Systems     OBJECTIVE:     /84 (BP  Location: Right arm, Patient Position: Sitting)  Pulse 64  Wt 237 lb 9.6 oz (107.8 kg)  BMI 33.61 kg/m2  Body mass index is 33.61 kg/(m^2).  Physical Exam   Constitutional: He appears well-developed.   Pulmonary/Chest: Effort normal and breath sounds normal.   Skin: Skin is warm.   The left great toe shows improvement since his previous exam.  Healthy skin is present.  No signs of infection.  Both dorsal and posterior tibialis pulses are strong       none     ASSESSMENT/PLAN:           ICD-10-CM    1. Essential hypertension I10      Currently under good control.  Currently under good controUnder good control      Toe appears to be healing well  Cont to monitor  currently under good control  Abundio Abdi MD  [facility]

## 2018-09-12 NOTE — NURSING NOTE
Patient here for blood pressure follow up. Brigette Garnica LPN .......................9/12/2018  8:53 AM

## 2018-09-12 NOTE — MR AVS SNAPSHOT
After Visit Summary   9/12/2018    Bryan Kearney    MRN: [id number]           Patient Information     Date Of Birth          1945        Visit Information        Provider Department      9/12/2018 9:00 AM Abundio Abdi MD [facility]        Today's Diagnoses     Essential hypertension    -  1       Follow-ups after your visit        Who to contact     If you have questions or need follow up information about today's clinic visit or your schedule please contact [facility] directly at [phone].  Normal or non-critical lab and imaging results will be communicated to you by MyChart, letter or phone within 4 business days after the clinic has received the results. If you do not hear from us within 7 days, please contact the clinic through MyChart or phone. If you have a critical or abnormal lab result, we will notify you by phone as soon as possible.  Submit refill requests through [employer] or call your pharmacy and they will forward the refill request to us. Please allow 3 business days for your refill to be completed.          Additional Information About Your Visit        Care EveryWhere ID     This is your Care EveryWhere ID. This could be used by other organizations to access your [facility] medical records  [id number]        Your Vitals Were     Pulse BMI (Body Mass Index)                64 33.61 kg/m2           Blood Pressure from Last 3 Encounters:   09/12/18 134/84   09/05/18 122/78   01/11/18 136/78    Weight from Last 3 Encounters:   09/12/18 237 lb 9.6 oz (107.8 kg)   09/05/18 237 lb 3.2 oz (107.6 kg)   01/11/18 244 lb (110.7 kg)              Today, you had the following     No orders found for display         Today's Medication Changes          These changes are accurate as of 9/12/18 11:59 PM.  If you have any questions, ask your nurse or doctor.               These medicines have changed or have updated prescriptions.         Dose/Directions    allopurinol 100 MG tablet   Commonly known as:  ZYLOPRIM   This may have changed:  Another medication with the same name was removed. Continue taking this medication, and follow the directions you see here.   Used for:  Idiopathic gout, unspecified chronicity, unspecified site   Changed by:  Abundio Abdi MD        Dose:  100 mg   Take 1 tablet (100 mg) by mouth daily   Quantity:  90 tablet   Refills:  3                Primary Care Provider Office Phone # Fax #    Abundio Abdi [phone] [address] [facility] [address]        Equal Access to Services     [facility]: Hadii aad ku hadasho Soomaali, waaxda luqadaha, qaybta kaalmada adeegyada, waxsoraya dumont . So [facility] [phone].    ATENCIÓN: Si habla español, tiene a skinner disposición servicios gratuitos de asistencia lingüística. [facility] [phone].    We comply with applicable federal civil rights laws and [place] laws. We do not discriminate on the basis of race, color, national origin, age, disability, sex, sexual orientation, or gender identity.            Thank you!     Thank you for choosing [facility] AND [facility]  for your care. Our goal is always to provide you with excellent care. Hearing back from our patients is one way we can continue to improve our services. Please take a few minutes to complete the written survey that you may receive in the mail after your visit with us. Thank you!             Your Updated Medication List - Protect others around you: Learn how to safely use, store and throw away your medicines at www.disposemymeds.org.          This list is accurate as of 9/12/18 11:59 PM.  Always use your most recent med list.                   Brand Name Dispense Instructions for use Diagnosis    allopurinol 100 MG tablet    ZYLOPRIM    90 tablet    Take 1 tablet (100 mg) by mouth daily    Idiopathic gout, unspecified chronicity, unspecified site        aspirin 325 MG tablet      Take 325 mg by mouth daily        atenolol 50 MG tablet    TENORMIN    90 tablet    Take 1 tablet (50 mg) by mouth daily    Essential hypertension       hydrochlorothiazide 25 MG tablet    HYDRODIURIL    90 tablet    Take 1 tablet (25 mg) by mouth daily    Essential hypertension       HYDROcodone-acetaminophen 5-325 MG per tablet    NORCO    120 tablet    Take 1 tablet by mouth every 6 hours as needed    Degenerative joint disease of cervical and lumbar spine       ranitidine 150 MG tablet    ZANTAC    180 tablet    Take 1 tablet (150 mg) by mouth 2 times daily    Gastroesophageal reflux disease without esophagitis       simvastatin 20 MG tablet    ZOCOR    90 tablet    Take 1 tablet (20 mg) by mouth daily    Hypercholesterolemia

## 2018-09-20 RX ORDER — ALLOPURINOL 300 MG/1
TABLET ORAL
Qty: 90 TABLET | Refills: 3 | OUTPATIENT
Start: 2018-09-20

## 2018-09-20 NOTE — TELEPHONE ENCOUNTER
Allopurinol refilled on 9/5/18 #90 x 3 refills to [facility].    Erica Keith RN on 9/20/2018 at 10:41 AM

## 2018-10-10 ENCOUNTER — OFFICE VISIT (OUTPATIENT)
Dept: FAMILY MEDICINE | Facility: OTHER | Age: 73
End: 2018-10-10
Attending: FAMILY MEDICINE
Payer: MEDICARE

## 2018-10-10 VITALS
WEIGHT: 235.8 LBS | TEMPERATURE: 97.8 F | DIASTOLIC BLOOD PRESSURE: 72 MMHG | HEART RATE: 60 BPM | BODY MASS INDEX: 33.36 KG/M2 | SYSTOLIC BLOOD PRESSURE: 134 MMHG

## 2018-10-10 DIAGNOSIS — R31.9 HEMATURIA, UNSPECIFIED TYPE: Primary | ICD-10-CM

## 2018-10-10 DIAGNOSIS — N30.01 ACUTE CYSTITIS WITH HEMATURIA: ICD-10-CM

## 2018-10-10 PROBLEM — N30.00 ACUTE CYSTITIS: Status: ACTIVE | Noted: 2018-10-10

## 2018-10-10 LAB
ALBUMIN UR-MCNC: NEGATIVE MG/DL
APPEARANCE UR: ABNORMAL
BACTERIA #/AREA URNS HPF: ABNORMAL /HPF
BILIRUB UR QL STRIP: NEGATIVE
COLOR UR AUTO: YELLOW
GLUCOSE UR STRIP-MCNC: NEGATIVE MG/DL
HGB UR QL STRIP: ABNORMAL
KETONES UR STRIP-MCNC: NEGATIVE MG/DL
LEUKOCYTE ESTERASE UR QL STRIP: ABNORMAL
NITRATE UR QL: NEGATIVE
NON-SQ EPI CELLS #/AREA URNS LPF: ABNORMAL /LPF
PH UR STRIP: 7 PH (ref 5–9)
RBC #/AREA URNS AUTO: ABNORMAL /HPF
SOURCE: ABNORMAL
SP GR UR STRIP: 1.01 (ref 1–1.03)
UROBILINOGEN UR STRIP-ACNC: 0.2 EU/DL (ref 0.2–1)
WBC #/AREA URNS AUTO: ABNORMAL /HPF

## 2018-10-10 PROCEDURE — 87077 CULTURE AEROBIC IDENTIFY: CPT | Performed by: FAMILY MEDICINE

## 2018-10-10 PROCEDURE — G0463 HOSPITAL OUTPT CLINIC VISIT: HCPCS | Mod: 25

## 2018-10-10 PROCEDURE — G0463 HOSPITAL OUTPT CLINIC VISIT: HCPCS

## 2018-10-10 PROCEDURE — 99214 OFFICE O/P EST MOD 30 MIN: CPT | Performed by: FAMILY MEDICINE

## 2018-10-10 PROCEDURE — 81001 URINALYSIS AUTO W/SCOPE: CPT | Performed by: FAMILY MEDICINE

## 2018-10-10 PROCEDURE — 87086 URINE CULTURE/COLONY COUNT: CPT | Performed by: FAMILY MEDICINE

## 2018-10-10 RX ORDER — SULFAMETHOXAZOLE/TRIMETHOPRIM 800-160 MG
1 TABLET ORAL 2 TIMES DAILY
Qty: 14 TABLET | Refills: 0 | Status: SHIPPED | OUTPATIENT
Start: 2018-10-10 | End: 2019-02-11

## 2018-10-10 ASSESSMENT — ENCOUNTER SYMPTOMS
DYSURIA: 0
FREQUENCY: 1
DIFFICULTY URINATING: 1
CHILLS: 0
RESPIRATORY NEGATIVE: 1
FEVER: 0
EYES NEGATIVE: 1
DIZZINESS: 0
HEMATURIA: 1
PSYCHIATRIC NEGATIVE: 1

## 2018-10-10 ASSESSMENT — PAIN SCALES - GENERAL: PAINLEVEL: MODERATE PAIN (4)

## 2018-10-10 NOTE — NURSING NOTE
Patient here for blood in urine with pain and frequency for the past 1 month. Brigette Garnica LPN .......................10/10/2018  8:29 AM

## 2018-10-10 NOTE — MR AVS SNAPSHOT
After Visit Summary   10/10/2018    Bryan Kearney    MRN: [id number]           Patient Information     Date Of Birth          1945        Visit Information        Provider Department      10/10/2018 8:15 AM Abundio Abdi MD [facility]        Today's Diagnoses     Hematuria, unspecified type    -  1    Acute cystitis with hematuria           Follow-ups after your visit        Who to contact     If you have questions or need follow up information about today's clinic visit or your schedule please contact [facility] directly at [phone].  Normal or non-critical lab and imaging results will be communicated to you by MyChart, letter or phone within 4 business days after the clinic has received the results. If you do not hear from us within 7 days, please contact the clinic through MyChart or phone. If you have a critical or abnormal lab result, we will notify you by phone as soon as possible.  Submit refill requests through [employer] or call your pharmacy and they will forward the refill request to us. Please allow 3 business days for your refill to be completed.          Additional Information About Your Visit        Care EveryWhere ID     This is your Care EveryWhere ID. This could be used by other organizations to access your [facility] medical records  [id number]        Your Vitals Were     Pulse Temperature BMI (Body Mass Index)             60 97.8  F (36.6  C) 33.36 kg/m2          Blood Pressure from Last 3 Encounters:   10/10/18 134/72   09/12/18 134/84   09/05/18 122/78    Weight from Last 3 Encounters:   10/10/18 235 lb 12.8 oz (107 kg)   09/12/18 237 lb 9.6 oz (107.8 kg)   09/05/18 237 lb 3.2 oz (107.6 kg)              We Performed the Following     Urinalysis w Reflex Microscopic If Positive     Urine Culture Aerobic Bacterial     Urine Microscopic          Today's Medication Changes          These changes are accurate as of 10/10/18  1:55  PM.  If you have any questions, ask your nurse or doctor.               Start taking these medicines.        Dose/Directions    sulfamethoxazole-trimethoprim 800-160 MG per tablet   Commonly known as:  BACTRIM DS/SEPTRA DS   Used for:  Acute cystitis with hematuria   Started by:  Abundio Abdi MD        Dose:  1 tablet   Take 1 tablet by mouth 2 times daily   Quantity:  14 tablet   Refills:  0         Stop taking these medicines if you haven't already. Please contact your care team if you have questions.     allopurinol 100 MG tablet   Commonly known as:  ZYLOPRIM   Stopped by:  Abundio Abdi MD                Where to get your medicines      These medications were sent to [facility] - [place], [place] - [address]  [address], [facility] [address]     Phone:  [phone]     sulfamethoxazole-trimethoprim 800-160 MG per tablet                Primary Care Provider Office Phone # Fax #    Abundio Abdi [phone] [address] [facility] [address]        Equal Access to Services     [facility]: Hadii aad ku hadasho Soomaali, waaxda luqadaha, qaybta kaalmada adeegyada, waxay idiin hayaan alexy dumont . So [facility] [phone].    ATENCIÓN: Si habla español, tiene a skinner disposición servicios gratuitos de asistencia lingüística. Ll[facility] [phone].    We comply with applicable federal civil rights laws and [place] laws. We do not discriminate on the basis of race, color, national origin, age, disability, sex, sexual orientation, or gender identity.            Thank you!     Thank you for choosing [facility]  for your care. Our goal is always to provide you with excellent care. Hearing back from our patients is one way we can continue to improve our services. Please take a few minutes to complete the written survey that you may receive in the mail after your visit with us. Thank you!             Your Updated Medication  List - Protect others around you: Learn how to safely use, store and throw away your medicines at www.disposemymeds.org.          This list is accurate as of 10/10/18  1:55 PM.  Always use your most recent med list.                   Brand Name Dispense Instructions for use Diagnosis    aspirin 325 MG tablet      Take 325 mg by mouth daily        atenolol 50 MG tablet    TENORMIN    90 tablet    Take 1 tablet (50 mg) by mouth daily    Essential hypertension       hydrochlorothiazide 25 MG tablet    HYDRODIURIL    90 tablet    Take 1 tablet (25 mg) by mouth daily    Essential hypertension       HYDROcodone-acetaminophen 5-325 MG per tablet    NORCO    120 tablet    Take 1 tablet by mouth every 6 hours as needed    Degenerative joint disease of cervical and lumbar spine       ranitidine 150 MG tablet    ZANTAC    180 tablet    Take 1 tablet (150 mg) by mouth 2 times daily    Gastroesophageal reflux disease without esophagitis       simvastatin 20 MG tablet    ZOCOR    90 tablet    Take 1 tablet (20 mg) by mouth daily    Hypercholesterolemia       sulfamethoxazole-trimethoprim 800-160 MG per tablet    BACTRIM DS/SEPTRA DS    14 tablet    Take 1 tablet by mouth 2 times daily    Acute cystitis with hematuria

## 2018-10-10 NOTE — PROGRESS NOTES
SUBJECTIVE:   Bryan Kearney is a 73 year old male who presents to clinic today for the following health issues: Blood in the urine    HPI Comments: Patient arrives here for blood in the urine.  He reports increasing frequency.  Started about 2 weeks ago when he had a sudden onset of pain.  States like he was passing a stone.  Since then the pain has resolved but he continues with quite a bit of urinary frequency.  The blood is also resolved.  Initially the pain was an 8 out of 10.  States he is going all the time.  Denies any backache.    Kidney Problem         Patient Active Problem List    Diagnosis Date Noted     Acute cystitis 10/10/2018     Priority: Medium     Esophageal reflux 09/05/2018     Priority: Medium     Degenerative joint disease of cervical and lumbar spine 09/05/2018     Priority: Medium     Actinic keratosis 11/29/2015     Priority: Medium     Solar keratosis 05/29/2015     Priority: Medium     Colonoscopy refused 07/29/2013     Priority: Medium     Overview:   Discussed and refused 07/2013       Hypercholesterolemia 05/11/2011     Priority: Medium     Gout 04/14/2010     Priority: Medium     Benign neoplasm of colon 09/18/2009     Priority: Medium     Overview:   benign       Hearing loss 09/18/2009     Priority: Medium     HTN (hypertension) 12/24/2007     Priority: Medium     Overview:   IMO Update 10/11       Past Medical History:   Diagnosis Date     Electrocution     H/o electrocution 13,800 volts     Essential (primary) hypertension     Hypertension     Gastro-esophageal reflux disease without esophagitis     G E R D      Past Surgical History:   Procedure Laterality Date     APPENDECTOMY OPEN      No Comments Provided     ARTHROSCOPY SHOULDER ROTATOR CUFF REPAIR      No Comments Provided     COLONOSCOPY      06/04/2001,Next colonoscopy due in 2011.     OTHER SURGICAL HISTORY      ,CAROTID ENDARDECTOMY,Left carotid endarterectomy secondary to high grade stenosis     OTHER SURGICAL  HISTORY      ,CAROTID ENDARDECTOMY,Bilateral endarterectomy     Social History     Social History Narrative    . Retired .    3 nchildren    Alcohol Use - yes  occ beer    Preload  7/26/2013     Current Outpatient Prescriptions   Medication Sig Dispense Refill     aspirin 325 MG tablet Take 325 mg by mouth daily       atenolol (TENORMIN) 50 MG tablet Take 1 tablet (50 mg) by mouth daily 90 tablet 3     hydrochlorothiazide (HYDRODIURIL) 25 MG tablet Take 1 tablet (25 mg) by mouth daily 90 tablet 3     HYDROcodone-acetaminophen (NORCO) 5-325 MG per tablet Take 1 tablet by mouth every 6 hours as needed 120 tablet 0     ranitidine (ZANTAC) 150 MG tablet Take 1 tablet (150 mg) by mouth 2 times daily 180 tablet 3     simvastatin (ZOCOR) 20 MG tablet Take 1 tablet (20 mg) by mouth daily 90 tablet 3     sulfamethoxazole-trimethoprim (BACTRIM DS/SEPTRA DS) 800-160 MG per tablet Take 1 tablet by mouth 2 times daily 14 tablet 0     No Known Allergies    Review of Systems   Constitutional: Negative for chills and fever.   Eyes: Negative.    Respiratory: Negative.    Cardiovascular: Negative for chest pain.   Genitourinary: Positive for difficulty urinating, frequency and hematuria. Negative for discharge and dysuria.   Neurological: Negative for dizziness.   Psychiatric/Behavioral: Negative.         OBJECTIVE:     /72 (BP Location: Right arm, Patient Position: Sitting)  Pulse 60  Temp 97.8  F (36.6  C)  Wt 235 lb 12.8 oz (107 kg)  BMI 33.36 kg/m2  Body mass index is 33.36 kg/(m^2).  Physical Exam   Constitutional: He appears well-developed.   Cardiovascular: Normal rate and regular rhythm.    Pulmonary/Chest: Effort normal.   Abdominal: He exhibits no distension and no mass. There is no tenderness. There is no rebound and no guarding.   Genitourinary: Prostate normal.   Musculoskeletal:   No CVA tenderness   Neurological: He is alert.       Diagnostic Test Results:  Results for orders placed or  performed in visit on 10/10/18 (from the past 24 hour(s))   Urinalysis w Reflex Microscopic If Positive   Result Value Ref Range    Color Urine Yellow     Appearance Urine Cloudy     Glucose Urine Negative NEG^Negative mg/dL    Bilirubin Urine Negative NEG^Negative    Ketones Urine Negative NEG^Negative mg/dL    Specific Gravity Urine 1.015 1.000 - 1.030    Blood Urine Small (A) NEG^Negative    pH Urine 7.0 5.0 - 9.0 pH    Protein Albumin Urine Negative NEG^Negative mg/dL    Urobilinogen Urine 0.2 0.2 - 1.0 EU/dL    Nitrite Urine Negative NEG^Negative    Leukocyte Esterase Urine Large (A) NEG^Negative    Source Midstream Urine    Urine Microscopic   Result Value Ref Range    WBC Urine 25-50 (A) OTO5^0 - 5 /HPF    RBC Urine 2-5 (A) OTO2^O - 2 /HPF    Squamous Epithelial /LPF Urine Few FEW^Few /LPF    Bacteria Urine Few (A) NEG^Negative /HPF     Urine residual was 51 mL  ASSESSMENT/PLAN:     Culture urine.  Likely UTI.  Also recommend he follow-up in 2 weeks to confirm urinalysis normalizes.  If not urology referral.    1. Hematuria, unspecified type    - Urinalysis w Reflex Microscopic If Positive  - Urine Microscopic  - Urine Culture Aerobic Bacterial    2. Acute cystitis with hematuria    - sulfamethoxazole-trimethoprim (BACTRIM DS/SEPTRA DS) 800-160 MG per tablet; Take 1 tablet by mouth 2 times daily  Dispense: 14 tablet; Refill: 0      Abundio Abdi MD  [facility] AND \[facility]\""

## 2018-10-13 LAB
BACTERIA SPEC CULT: ABNORMAL
SPECIMEN SOURCE: ABNORMAL

## 2018-10-15 DIAGNOSIS — I10 ESSENTIAL HYPERTENSION: ICD-10-CM

## 2018-10-18 RX ORDER — ATENOLOL 50 MG/1
TABLET ORAL
Qty: 90 TABLET | Refills: 3 | OUTPATIENT
Start: 2018-10-18

## 2018-10-18 NOTE — TELEPHONE ENCOUNTER
Filled 9/5/18 #90 x 3. Pharmacy alerted. Unable to complete prescription refill per RNMedication Refill Policy.................... Ernestina Gross ....................  10/18/2018   12:28 PM    atenolol (TENORMIN) 50 MG tablet 90 tablet 3 9/5/2018  No   Sig - Route: Take 1 tablet (50 mg) by mouth daily - Oral   Class: E-Prescribe   Order: [id number]   E-Prescribing Status: Receipt confirmed by pharmacy (9/5/2018  1:57 PM CDT)   Printout Tracking   External Result Report   Pharmacy   [facility] - [place], [place] - [facility] [address]

## 2019-02-11 ENCOUNTER — TRANSFERRED RECORDS (OUTPATIENT)
Dept: HEALTH INFORMATION MANAGEMENT | Facility: OTHER | Age: 74
End: 2019-02-11

## 2019-02-11 ENCOUNTER — OFFICE VISIT (OUTPATIENT)
Dept: FAMILY MEDICINE | Facility: OTHER | Age: 74
End: 2019-02-11
Attending: FAMILY MEDICINE
Payer: COMMERCIAL

## 2019-02-11 VITALS
HEART RATE: 60 BPM | BODY MASS INDEX: 34.2 KG/M2 | WEIGHT: 241.8 LBS | SYSTOLIC BLOOD PRESSURE: 124 MMHG | DIASTOLIC BLOOD PRESSURE: 70 MMHG

## 2019-02-11 DIAGNOSIS — M25.552 HIP PAIN, LEFT: ICD-10-CM

## 2019-02-11 DIAGNOSIS — L24.5 IRRITANT CONTACT DERMATITIS DUE TO OTHER CHEMICAL PRODUCTS: ICD-10-CM

## 2019-02-11 DIAGNOSIS — M25.551 HIP PAIN, RIGHT: Primary | ICD-10-CM

## 2019-02-11 PROCEDURE — G0463 HOSPITAL OUTPT CLINIC VISIT: HCPCS

## 2019-02-11 PROCEDURE — 99214 OFFICE O/P EST MOD 30 MIN: CPT | Performed by: FAMILY MEDICINE

## 2019-02-11 RX ORDER — OXYCODONE HYDROCHLORIDE 5 MG/1
5 TABLET ORAL EVERY 4 HOURS PRN
Qty: 20 TABLET | Refills: 0 | Status: SHIPPED | OUTPATIENT
Start: 2019-02-11 | End: 2019-03-01

## 2019-02-11 RX ORDER — PREDNISONE 20 MG/1
TABLET ORAL
Qty: 20 TABLET | Refills: 0 | Status: SHIPPED | OUTPATIENT
Start: 2019-02-11 | End: 2019-03-01

## 2019-02-11 RX ORDER — PREDNISONE 20 MG/1
TABLET ORAL
Qty: 20 TABLET | Refills: 0 | Status: SHIPPED | OUTPATIENT
Start: 2019-02-11 | End: 2019-02-11

## 2019-02-11 ASSESSMENT — PAIN SCALES - GENERAL: PAINLEVEL: WORST PAIN (10)

## 2019-02-11 NOTE — NURSING NOTE
Patient here for right hip pain for the past s5 days. He twisted wrong carrying a 5 gallon bucket. Medication Reconciliation: complete.    Brigette Garnica LPN  2/11/2019 4:37 PM

## 2019-02-13 ASSESSMENT — ENCOUNTER SYMPTOMS
CHILLS: 0
EYES NEGATIVE: 1
FEVER: 0
PSYCHIATRIC NEGATIVE: 1
DIZZINESS: 0
RESPIRATORY NEGATIVE: 1

## 2019-02-13 NOTE — PROGRESS NOTES
SUBJECTIVE:   Bryan Kearney is a 73 year old male who presents to clinic today for the following health issues: Right hip pain    Patient arrives for right hip pain but points to his right buttocks.  States recently he was walking cutting wood when he twisted.  He rates the pain as 11 out of 10.  He has been using hydrocodone without much relief.  He also has been cutting a lot of wood and reports of face had very itchy and swollen.  He is been exposed to quite a bit of sawdust.  He denies any groin pain.  He reports his pain is 11 out of 10.  He denies any changes in bowel or bladder habits.  Walking does not cause any problems or discomforts.  Standing is okay.  He denies any respiratory illnesses.          Patient Active Problem List    Diagnosis Date Noted     Hip pain, left 02/11/2019     Priority: Medium     Irritant contact dermatitis due to other chemical products 02/11/2019     Priority: Medium     Acute cystitis 10/10/2018     Priority: Medium     Esophageal reflux 09/05/2018     Priority: Medium     Degenerative joint disease of cervical and lumbar spine 09/05/2018     Priority: Medium     Actinic keratosis 11/29/2015     Priority: Medium     Solar keratosis 05/29/2015     Priority: Medium     Colonoscopy refused 07/29/2013     Priority: Medium     Overview:   Discussed and refused 07/2013       Hypercholesterolemia 05/11/2011     Priority: Medium     Gout 04/14/2010     Priority: Medium     Benign neoplasm of colon 09/18/2009     Priority: Medium     Overview:   benign       Hearing loss 09/18/2009     Priority: Medium     HTN (hypertension) 12/24/2007     Priority: Medium     Overview:   IMO Update 10/11       Past Medical History:   Diagnosis Date     Electrocution     H/o electrocution 13,800 volts     Essential (primary) hypertension     Hypertension     Gastro-esophageal reflux disease without esophagitis     G E R D      Past Surgical History:   Procedure Laterality Date     APPENDECTOMY OPEN       No Comments Provided     ARTHROSCOPY SHOULDER ROTATOR CUFF REPAIR      No Comments Provided     COLONOSCOPY      06/04/2001,Next colonoscopy due in 2011.     OTHER SURGICAL HISTORY      ,CAROTID ENDARDECTOMY,Left carotid endarterectomy secondary to high grade stenosis     OTHER SURGICAL HISTORY      ,CAROTID ENDARDECTOMY,Bilateral endarterectomy     No Known Allergies    Review of Systems   Constitutional: Negative for chills and fever.   Eyes: Negative.    Respiratory: Negative.    Cardiovascular: Negative for chest pain.   Genitourinary: Negative.    Skin: Positive for rash.   Neurological: Negative for dizziness.   Psychiatric/Behavioral: Negative.         OBJECTIVE:     /70   Pulse 60   Wt 109.7 kg (241 lb 12.8 oz)   BMI 34.20 kg/m    Body mass index is 34.2 kg/m .  Physical Exam   Constitutional: He appears well-developed and well-nourished.   HENT:   Skin is edematous especially around the eyes.  Conjunctive is slightly inflamed.  Swelling in the upper and lower lids.   Eyes: Pupils are equal, round, and reactive to light.   Musculoskeletal:   Internal/external rotation of the hip is normal without pain.  Pain is reproduced on palpation to the right buttocks   Neurological: He is alert.   Skin: There is erythema.       none     ASSESSMENT/PLAN:         1. Hip pain, right  We will increase his pain meds to oxycodone.  Hold the hydrocodone for now.  I highly suspect that this is more muscular than his actual hip.  - oxyCODONE (ROXICODONE) 5 MG tablet; Take 1 tablet (5 mg) by mouth every 4 hours as needed for severe pain  Dispense: 20 tablet; Refill: 0    2. Hip pain, left      3. Irritant contact dermatitis due to other chemical products  Prednisone.  Highly likely he is allergic to solders.  - predniSONE (DELTASONE) 20 MG tablet; Take 60 mg by mouth daily for 3 days, THEN 40 mg daily for 3 days, THEN 20 mg daily for 3 days, THEN 10 mg daily for 3 days.  Dispense: 20 tablet; Refill:  0      Abundio Abdi MD  [facility]

## 2019-03-01 ENCOUNTER — OFFICE VISIT (OUTPATIENT)
Dept: FAMILY MEDICINE | Facility: OTHER | Age: 74
End: 2019-03-01
Attending: FAMILY MEDICINE
Payer: COMMERCIAL

## 2019-03-01 VITALS
BODY MASS INDEX: 34.03 KG/M2 | TEMPERATURE: 97.2 F | WEIGHT: 240.6 LBS | SYSTOLIC BLOOD PRESSURE: 128 MMHG | HEART RATE: 60 BPM | DIASTOLIC BLOOD PRESSURE: 68 MMHG | RESPIRATION RATE: 16 BRPM

## 2019-03-01 DIAGNOSIS — M47.816 DEGENERATIVE JOINT DISEASE OF CERVICAL AND LUMBAR SPINE: ICD-10-CM

## 2019-03-01 DIAGNOSIS — M79.661 PAIN OF RIGHT LOWER LEG: ICD-10-CM

## 2019-03-01 DIAGNOSIS — M47.812 DEGENERATIVE JOINT DISEASE OF CERVICAL AND LUMBAR SPINE: ICD-10-CM

## 2019-03-01 PROCEDURE — G0463 HOSPITAL OUTPT CLINIC VISIT: HCPCS

## 2019-03-01 PROCEDURE — 99213 OFFICE O/P EST LOW 20 MIN: CPT | Performed by: FAMILY MEDICINE

## 2019-03-01 RX ORDER — HYDROCODONE BITARTRATE AND ACETAMINOPHEN 5; 325 MG/1; MG/1
1 TABLET ORAL EVERY 6 HOURS PRN
Qty: 45 TABLET | Refills: 0 | Status: SHIPPED | OUTPATIENT
Start: 2019-03-01 | End: 2019-08-05

## 2019-03-01 ASSESSMENT — PATIENT HEALTH QUESTIONNAIRE - PHQ9: SUM OF ALL RESPONSES TO PHQ QUESTIONS 1-9: 0

## 2019-03-01 ASSESSMENT — PAIN SCALES - GENERAL: PAINLEVEL: NO PAIN (0)

## 2019-03-01 NOTE — NURSING NOTE
Patient here for right hip pain. Medication Reconciliation: complete.    Brigette Garnica LPN  3/1/2019 9:37 AM

## 2019-03-03 ASSESSMENT — ENCOUNTER SYMPTOMS
MYALGIAS: 1
ARTHRALGIAS: 0
BACK PAIN: 0

## 2019-03-03 NOTE — PROGRESS NOTES
SUBJECTIVE:   Bryan Kearney is a 73 year old male who presents to clinic today for the following health issues: Hip pain    Patient arrives here for hip pain.  He reports standing is okay.  Laying makes it worse.  When he is lying he rates it as a 5 out of 10.  Pain goes down the right leg.  And on the outside.  He is not complaining any pain in the groin area.  He did have some Norco at home which she has been using on a rare basis.  He has no pain with walking.          Patient Active Problem List    Diagnosis Date Noted     Pain of right lower leg 03/01/2019     Priority: Medium     Hip pain, left 02/11/2019     Priority: Medium     Irritant contact dermatitis due to other chemical products 02/11/2019     Priority: Medium     Acute cystitis 10/10/2018     Priority: Medium     Esophageal reflux 09/05/2018     Priority: Medium     Degenerative joint disease of cervical and lumbar spine 09/05/2018     Priority: Medium     Actinic keratosis 11/29/2015     Priority: Medium     Solar keratosis 05/29/2015     Priority: Medium     Colonoscopy refused 07/29/2013     Priority: Medium     Overview:   Discussed and refused 07/2013       Hypercholesterolemia 05/11/2011     Priority: Medium     Gout 04/14/2010     Priority: Medium     Benign neoplasm of colon 09/18/2009     Priority: Medium     Overview:   benign       Hearing loss 09/18/2009     Priority: Medium     HTN (hypertension) 12/24/2007     Priority: Medium     Overview:   IMO Update 10/11       Past Medical History:   Diagnosis Date     Electrocution     H/o electrocution 13,800 volts     Essential (primary) hypertension     Hypertension     Gastro-esophageal reflux disease without esophagitis     G E R D      Past Surgical History:   Procedure Laterality Date     APPENDECTOMY OPEN      No Comments Provided     ARTHROSCOPY SHOULDER ROTATOR CUFF REPAIR      No Comments Provided     COLONOSCOPY      06/04/2001,Next colonoscopy due in 2011.     OTHER SURGICAL  HISTORY      ,CAROTID ENDARDECTOMY,Left carotid endarterectomy secondary to high grade stenosis     OTHER SURGICAL HISTORY      ,CAROTID ENDARDECTOMY,Bilateral endarterectomy     Social History     Social History Narrative    . Retired .    3 nchildren    Alcohol Use - yes  occ beer    Preload  7/26/2013     Current Outpatient Medications   Medication Sig Dispense Refill     aspirin 325 MG tablet Take 325 mg by mouth daily       atenolol (TENORMIN) 50 MG tablet Take 1 tablet (50 mg) by mouth daily 90 tablet 3     hydrochlorothiazide (HYDRODIURIL) 25 MG tablet Take 1 tablet (25 mg) by mouth daily 90 tablet 3     HYDROcodone-acetaminophen (NORCO) 5-325 MG tablet Take 1 tablet by mouth every 6 hours as needed 45 tablet 0     ranitidine (ZANTAC) 150 MG tablet Take 1 tablet (150 mg) by mouth 2 times daily 180 tablet 3     simvastatin (ZOCOR) 20 MG tablet Take 1 tablet (20 mg) by mouth daily 90 tablet 3     No Known Allergies    Review of Systems   Genitourinary:        Patient denies any changes in bowel or bladder habits   Musculoskeletal: Positive for myalgias. Negative for arthralgias and back pain.        OBJECTIVE:     /68   Pulse 60   Temp 97.2  F (36.2  C)   Resp 16   Wt 109.1 kg (240 lb 9.6 oz)   BMI 34.03 kg/m    Body mass index is 34.03 kg/m .  Physical Exam   Constitutional: He appears well-developed.   Abdominal: Soft. Bowel sounds are normal.   Musculoskeletal: Normal range of motion.   Neurological: He is alert.   Deep tendon reflexes equal bilateral.  Internal/external rotation without any discomfort.   Skin: Skin is warm.       none     ASSESSMENT/PLAN:         1. Pain of right lower leg  Examination is most consistent with muscular.  I doubt that this is referred pain from the lower back.  Obtain  - PHYSICAL THERAPY REFERRAL; Future    2. Degenerative joint disease of cervical and lumbar spine  Refill  - HYDROcodone-acetaminophen (NORCO) 5-325 MG tablet; Take 1  tablet by mouth every 6 hours as needed  Dispense: 45 tablet; Refill: 0      Abundio Abdi MD  [facility] AND [facility]

## 2019-03-06 ENCOUNTER — HOSPITAL ENCOUNTER (OUTPATIENT)
Dept: PHYSICAL THERAPY | Facility: OTHER | Age: 74
Setting detail: THERAPIES SERIES
End: 2019-03-06
Attending: FAMILY MEDICINE
Payer: COMMERCIAL

## 2019-03-06 DIAGNOSIS — M79.661 PAIN OF RIGHT LOWER LEG: ICD-10-CM

## 2019-03-06 PROCEDURE — 97161 PT EVAL LOW COMPLEX 20 MIN: CPT | Mod: GP | Performed by: PHYSICAL THERAPIST

## 2019-03-06 PROCEDURE — 97110 THERAPEUTIC EXERCISES: CPT | Mod: GP | Performed by: PHYSICAL THERAPIST

## 2019-03-06 NOTE — PROGRESS NOTES
03/06/19 1200   General Information   Type of Visit Initial OP Ortho PT Evaluation   Start of Care Date 03/06/19   Referring Physician Dr. Abdi   Patient/Family Goals Statement Get rid of the pain in my right hip    Orders Evaluate and Treat   Date of Order 03/01/19   Insurance Type Other;Medicare  ([employer] Advantage)   Medical Diagnosis Pain of right lower leg    Surgical/Medical history reviewed Yes   Precautions/Limitations no known precautions/limitations   General Information Comments Patient's preferred name is Rodolfo    Body Part(s)   Body Part(s) Hip   Presentation and Etiology   Pertinent history of current problem (include personal factors and/or comorbidities that impact the POC) Bryan reports about 2 weeks ago he slipped while carrying a gas can aprox 50 pounds and his right hip has hurt since. States it has gotten a lot better. States the pain goes down the front of his thigh, was not sleeping well. Sore with going up and down stairs but states he has railings to hold onto. States he can't sit still or it will stiffen up. Can ride in the car for about 30-60 minutes.  States getting out of the car and off the chair hurts his hip and into his low back.    Impairments A. Pain;D. Decreased ROM;H. Impaired gait   Functional Limitations perform activities of daily living   Symptom Location right lateral hip into anterior right thigh and down laterally to knee, some pain up into buttocks and lumbar spine on right as well    How/Where did it occur While carrying an object   Onset date of current episode/exacerbation 02/20/19   Chronicity New   Pain rating (0-10 point scale) Best (/10);Worst (/10)   Best (/10) 2/10   Worst (/10) 10/10   Pain quality B. Dull;C. Aching   Frequency of pain/symptoms B. Intermittent   Pain/symptoms are: Worse in the morning   Pain/symptoms exacerbated by E. Rest;G. Certain positions   Pain/symptoms eased by B. Walking   Prior Level of Function   Prior Level of  Function-Mobility independent and pain free   Prior Level of Function-ADLs independent and pain free   Current Level of Function   Patient role/employment history F. Retired   Living environment House/townhome   Home/community accessibility steps to enter home with railing    Current equipment-Gait/Locomotion None   Fall Risk Screen   Fall screen completed by PT   Have you fallen 2 or more times in the past year? No   Have you fallen and had an injury in the past year? No   Is patient a fall risk? No   Abuse Screen (yes response referral indicated)   Feels Unsafe at Home or Work/School no   Feels Threatened by Someone no   Does Anyone Try to Keep You From Having Contact with Others or Doing Things Outside Your Home? no   Physical Signs of Abuse Present no   Hip Objective Findings   Side (if bilateral, select both right and left) Right   Gait/Locomotion Mildly antaglic to the right    Hip ROM Comments pain at end range of hip IR and ER    Pelvic Screen neutral pelvic alingment and leg length    Palpation TTP along right trochanteric region and down into ITB 2/3 of the way to the knee    Right Hip Flexion PROM 100   Right Hip Abduction PROM 50   Right Hip ER PROM 50   Right Hip IR PROM 20   Right Hip Flexion Strength 5/5   Right Hip Abduction Strength 3+/5   Right Hip IR Strength 3+/5   Right Hip ER Strength 4/5   Right Hamstring Flexibility 48 degrees    Right Piriformis Flexibility tightness noted with some pain reported in lateral hip    Planned Therapy Interventions   Planned Therapy Interventions balance training;gait training;joint mobilization;manual therapy;motor coordination training;neuromuscular re-education;ROM;strengthening;stretching;transfer training   Planned Therapy Interventions Comment TE, TA, NMR, gait training, manual therapy    Planned Modality Interventions   Planned Modality Interventions Cryotherapy;Electrical stimulation;Hot packs;Ultrasound   Clinical Impression   Criteria for Skilled  Therapeutic Interventions Met yes, treatment indicated   PT Diagnosis right hip pain, weakness   Functional limitations due to impairments impaired functional mobility due to right hip pain   Clinical Presentation Stable/Uncomplicated   Clinical Presentation Rationale clinical judgement   Clinical Decision Making (Complexity) Low complexity   Therapy Frequency 2 times/Week   Predicted Duration of Therapy Intervention (days/wks) 8 weeks   Risk & Benefits of therapy have been explained Yes   Patient, Family & other staff in agreement with plan of care Yes   Education Assessment   Preferred Learning Style Demonstration   Barriers to Learning No barriers   ORTHO GOALS   PT Ortho Eval Goals 1;2;3;4   Ortho Goal 1   Goal Identifier ROM   Goal Description Rodolfo will demonstrate right hip PROM by at least 8 degrees for improved joint mobility for transfers without difficulty or pain.    Target Date 04/03/19   Ortho Goal 2   Goal Identifier strength   Goal Description Rodolfo will demonstrate improved right hip strength by at least 1/2 grade to facilitate improved tolerance to transfers and walking without difficulty.    Target Date 04/03/19   Ortho Goal 3   Goal Identifier stairs   Goal Description Rodolfo will tolerate ascending and descending stairs with recirpocal pattern and railing with no greater than 2/10 right hip pain to faciltiate independence and ease with household navigation.    Target Date 04/17/19   Ortho Goal 4   Goal Identifier balance   Goal Description Rodolfo will demonstrate ability to maintain right SLS at least 8 seconds for improved strength and proprioception for safety within the home.    Target Date 05/01/19   Total Evaluation Time   PT Eval, Low Complexity Minutes (64844) 20   Therapy Certification   Certification date from 03/06/19   Certification date to 05/01/19   Medical Diagnosis pain of right lower leg

## 2019-03-06 NOTE — PROGRESS NOTES
[facility]          OUTPATIENT PHYSICAL THERAPY ORTHOPEDIC EVALUATION  PLAN OF TREATMENT FOR OUTPATIENT REHABILITATION  (COMPLETE FOR INITIAL CLAIMS ONLY)  Patient's Last Name, First Name, M.I.  YOB: 1945  Bryan Kearney  HIMANSHU    Provider s Name:  [facility]   Medical Record No.  [id number]   Start of Care Date:  03/06/19   Onset Date:  02/20/19   Type:     _X__PT   ___OT   ___SLP Medical Diagnosis:  pain of right lower leg      PT Diagnosis:  right hip pain, weakness   Visits from SOC:  1      _________________________________________________________________________________  Plan of Treatment/Functional Goals:  balance training, gait training, joint mobilization, manual therapy, motor coordination training, neuromuscular re-education, ROM, strengthening, stretching, transfer training  TE, TA, NMR, gait training, manual therapy   Cryotherapy, Electrical stimulation, Hot packs, Ultrasound     Goals  Goal Identifier: ROM  Goal Description: Rodolfo will demonstrate right hip PROM by at least 8 degrees for improved joint mobility for transfers without difficulty or pain.   Target Date: 04/03/19    Goal Identifier: strength  Goal Description: Rodolfo will demonstrate improved right hip strength by at least 1/2 grade to facilitate improved tolerance to transfers and walking without difficulty.   Target Date: 04/03/19    Goal Identifier: stairs  Goal Description: Rodolfo will tolerate ascending and descending stairs with recirpocal pattern and railing with no greater than 2/10 right hip pain to faciltiate independence and ease with household navigation.   Target Date: 04/17/19    Goal Identifier: balance  Goal Description: Rodolfo will demonstrate ability to maintain right SLS at least 8 seconds for improved strength and proprioception for safety within the home.   Target Date: 05/01/19                                                Therapy Frequency:  2 times/Week  Predicted Duration  of Therapy Intervention:  8 weeks    Harmony Sawyer, PT                 I CERTIFY THE NEED FOR THESE SERVICES FURNISHED UNDER        THIS PLAN OF TREATMENT AND WHILE UNDER MY CARE     (Physician co-signature of this document indicates review and certification of the therapy plan).                       Certification Date From:  03/06/19   Certification Date To:  05/01/19    Referring Provider:  Dr. Abdi    Initial Assessment        See Epic Evaluation Start of Care Date: 03/06/19

## 2019-03-08 ENCOUNTER — HOSPITAL ENCOUNTER (OUTPATIENT)
Dept: PHYSICAL THERAPY | Facility: OTHER | Age: 74
Setting detail: THERAPIES SERIES
End: 2019-03-08
Attending: FAMILY MEDICINE
Payer: COMMERCIAL

## 2019-03-08 PROCEDURE — 97110 THERAPEUTIC EXERCISES: CPT | Mod: GP | Performed by: PHYSICAL THERAPIST

## 2019-03-09 ENCOUNTER — OFFICE VISIT (OUTPATIENT)
Dept: FAMILY MEDICINE | Facility: OTHER | Age: 74
End: 2019-03-09
Attending: FAMILY MEDICINE
Payer: COMMERCIAL

## 2019-03-09 VITALS
RESPIRATION RATE: 18 BRPM | TEMPERATURE: 97.2 F | BODY MASS INDEX: 32.86 KG/M2 | HEIGHT: 72 IN | HEART RATE: 51 BPM | WEIGHT: 242.6 LBS | SYSTOLIC BLOOD PRESSURE: 168 MMHG | DIASTOLIC BLOOD PRESSURE: 88 MMHG | OXYGEN SATURATION: 96 %

## 2019-03-09 DIAGNOSIS — I10 ESSENTIAL HYPERTENSION: Primary | ICD-10-CM

## 2019-03-09 PROCEDURE — G0463 HOSPITAL OUTPT CLINIC VISIT: HCPCS

## 2019-03-09 PROCEDURE — 99213 OFFICE O/P EST LOW 20 MIN: CPT | Performed by: FAMILY MEDICINE

## 2019-03-09 RX ORDER — AMLODIPINE BESYLATE 5 MG/1
5 TABLET ORAL DAILY
Qty: 30 TABLET | Refills: 5 | Status: SHIPPED | OUTPATIENT
Start: 2019-03-09 | End: 2019-07-30

## 2019-03-09 ASSESSMENT — PAIN SCALES - GENERAL: PAINLEVEL: MILD PAIN (2)

## 2019-03-09 ASSESSMENT — MIFFLIN-ST. JEOR: SCORE: 1865.49

## 2019-03-09 NOTE — NURSING NOTE
Patient in clinic for high blood pressure x 2 days.  This morning systolic was over 200.  Patient is tapering off prednisone he is taking for hip injury/therapy.  Roxane Marrero LPN....................  3/9/2019   1:24 PM    No chief complaint on file.      Medication Reconciliation: complete    Roxane Marrero LPN

## 2019-03-09 NOTE — PROGRESS NOTES
SUBJECTIVE:   Bryan Kearney is a 73 year old male who presents to clinic today for the following health issues:Elevated blood pressure   Patient arrives here for an elevated blood pressure.  States he was recently the therapist and they noticed his blood pressure was high.  He subsequently did do his blood pressure at home and has continued to be high and last night systolic as high as 200.  He believes that is probably due to his prednisone.  He is not having any complaints except a little dizziness when he is bending over and sits up suddenly.  Patient is on 2 blood pressure medications.          Patient Active Problem List    Diagnosis Date Noted     Pain of right lower leg 03/01/2019     Priority: Medium     Hip pain, left 02/11/2019     Priority: Medium     Irritant contact dermatitis due to other chemical products 02/11/2019     Priority: Medium     Acute cystitis 10/10/2018     Priority: Medium     Esophageal reflux 09/05/2018     Priority: Medium     Degenerative joint disease of cervical and lumbar spine 09/05/2018     Priority: Medium     Actinic keratosis 11/29/2015     Priority: Medium     Solar keratosis 05/29/2015     Priority: Medium     Colonoscopy refused 07/29/2013     Priority: Medium     Overview:   Discussed and refused 07/2013       Hypercholesterolemia 05/11/2011     Priority: Medium     Gout 04/14/2010     Priority: Medium     Benign neoplasm of colon 09/18/2009     Priority: Medium     Overview:   benign       Hearing loss 09/18/2009     Priority: Medium     HTN (hypertension) 12/24/2007     Priority: Medium     Overview:   IMO Update 10/11       Past Medical History:   Diagnosis Date     Electrocution     H/o electrocution 13,800 volts     Essential (primary) hypertension     Hypertension     Gastro-esophageal reflux disease without esophagitis     G E R D      Past Surgical History:   Procedure Laterality Date     APPENDECTOMY OPEN      No Comments Provided     ARTHROSCOPY SHOULDER  "ROTATOR CUFF REPAIR      No Comments Provided     COLONOSCOPY      06/04/2001,Next colonoscopy due in 2011.     OTHER SURGICAL HISTORY      ,CAROTID ENDARDECTOMY,Left carotid endarterectomy secondary to high grade stenosis     OTHER SURGICAL HISTORY      ,CAROTID ENDARDECTOMY,Bilateral endarterectomy     No Known Allergies    Review of Systems     OBJECTIVE:     /88 (BP Location: Right arm, Patient Position: Sitting, Cuff Size: Adult Large)   Pulse 51   Temp 97.2  F (36.2  C) (Tympanic)   Resp 18   Ht 1.816 m (5' 11.5\")   Wt 110 kg (242 lb 9.6 oz)   SpO2 96%   BMI 33.36 kg/m     Body mass index is 33.36 kg/m .  Physical Exam   Constitutional: He appears well-developed.   Cardiovascular: Normal rate and regular rhythm.   No murmur heard.  Pulmonary/Chest: Effort normal and breath sounds normal.       none     ASSESSMENT/PLAN:         1. Essential hypertension  Currently not under good control.  Will add amlodipine.  Continue with outpatient blood pressure checks.  - amLODIPine (NORVASC) 5 MG tablet; Take 1 tablet (5 mg) by mouth daily  Dispense: 30 tablet; Refill: 5      Abundio Abdi MD  [facility]  "

## 2019-04-19 NOTE — ADDENDUM NOTE
Encounter addended by: Rema Sawyer, PT on: 4/19/2019 10:57 AM   Actions taken: Pend clinical note, Flowsheet accepted, Sign clinical note, Episode resolved

## 2019-04-19 NOTE — PROGRESS NOTES
Outpatient Physical Therapy Discharge Note     Patient: Bryan Kearney  : 1945    Beginning/End Dates of Reporting Period:  3/6/19 to 2019    Referring Provider: Dr. Abdi    Therapy Diagnosis: right hip pain, weakness      Client Self Report: Rodolfo states his hip is feeling pretty good right now, stretches are good. But he started to have dizzy spells this morning. States when he bends down and stands up. Reports he probably won't keep remaining appts as he is feeling so much better. States he thinks his steroid he is taking is causing the dizziness but he's now down to two a day.     Patient has not been seen in outpatient PT since last visit on 3/8/19, all information from that date:     Objective Measurements:  Objective Measure: pain right hip  Details: 0/10    ORTHO GOALS   PT Ortho Eval Goals 1;2;3;4   Ortho Goal 1   Goal Identifier ROM   Goal Description Rodolfo will demonstrate right hip PROM by at least 8 degrees for improved joint mobility for transfers without difficulty or pain.    Target Date 19   Ortho Goal 2   Goal Identifier strength   Goal Description Rodolfo will demonstrate improved right hip strength by at least 1/2 grade to facilitate improved tolerance to transfers and walking without difficulty.    Target Date 19   Ortho Goal 3   Goal Identifier stairs   Goal Description Rodolfo will tolerate ascending and descending stairs with recirpocal pattern and railing with no greater than 2/10 right hip pain to faciltiate independence and ease with household navigation.    Target Date 19   Ortho Goal 4   Goal Identifier balance   Goal Description Rodolfo will demonstrate ability to maintain right SLS at least 8 seconds for improved strength and proprioception for safety within the home.      Unable to reassess goals as patient did not return to PT.   HEP: [id number]    Plan:  Discharge from therapy.    Discharge:    Reason for Discharge: Patient chooses to discontinue therapy.    Equipment  Issued: HEP    Discharge Plan: Patient to continue home program.

## 2019-05-23 ENCOUNTER — OFFICE VISIT (OUTPATIENT)
Dept: FAMILY MEDICINE | Facility: OTHER | Age: 74
End: 2019-05-23
Attending: FAMILY MEDICINE
Payer: COMMERCIAL

## 2019-05-23 VITALS
RESPIRATION RATE: 16 BRPM | HEIGHT: 72 IN | BODY MASS INDEX: 32.8 KG/M2 | SYSTOLIC BLOOD PRESSURE: 138 MMHG | DIASTOLIC BLOOD PRESSURE: 68 MMHG | HEART RATE: 60 BPM | WEIGHT: 242.2 LBS | TEMPERATURE: 97.4 F

## 2019-05-23 DIAGNOSIS — I10 ESSENTIAL HYPERTENSION: Primary | ICD-10-CM

## 2019-05-23 PROCEDURE — 90670 PCV13 VACCINE IM: CPT

## 2019-05-23 PROCEDURE — G0009 ADMIN PNEUMOCOCCAL VACCINE: HCPCS

## 2019-05-23 PROCEDURE — 96372 THER/PROPH/DIAG INJ SC/IM: CPT

## 2019-05-23 PROCEDURE — G0463 HOSPITAL OUTPT CLINIC VISIT: HCPCS | Mod: 25

## 2019-05-23 PROCEDURE — G0463 HOSPITAL OUTPT CLINIC VISIT: HCPCS

## 2019-05-23 PROCEDURE — 99213 OFFICE O/P EST LOW 20 MIN: CPT | Performed by: FAMILY MEDICINE

## 2019-05-23 ASSESSMENT — MIFFLIN-ST. JEOR: SCORE: 1863.67

## 2019-05-23 ASSESSMENT — PATIENT HEALTH QUESTIONNAIRE - PHQ9: SUM OF ALL RESPONSES TO PHQ QUESTIONS 1-9: 0

## 2019-05-23 ASSESSMENT — PAIN SCALES - GENERAL: PAINLEVEL: MODERATE PAIN (4)

## 2019-05-23 NOTE — NURSING NOTE
Patient here for fluctuating blood pressures. Medication Reconciliation: complete.    Brigette Garnica LPN  5/23/2019 3:01 PM

## 2019-05-24 NOTE — PROGRESS NOTES
SUBJECTIVE:   Bryan Kearney is a 73 year old male who presents to clinic today for the following health issues: Follow-up high blood pressure    Patient arrives here for follow-up blood pressure.  He does bring with him his home monitoring showing systolic in the 170s down to the 150s diastolic 80s to low 90s.  Review of the chart shows that his blood pressure year is always been satisfactory.  Patient is wondering about his blood pressure machine being slightly off        Patient Active Problem List    Diagnosis Date Noted     Pain of right lower leg 03/01/2019     Priority: Medium     Hip pain, left 02/11/2019     Priority: Medium     Irritant contact dermatitis due to other chemical products 02/11/2019     Priority: Medium     Acute cystitis 10/10/2018     Priority: Medium     Esophageal reflux 09/05/2018     Priority: Medium     Degenerative joint disease of cervical and lumbar spine 09/05/2018     Priority: Medium     Actinic keratosis 11/29/2015     Priority: Medium     Solar keratosis 05/29/2015     Priority: Medium     Colonoscopy refused 07/29/2013     Priority: Medium     Overview:   Discussed and refused 07/2013       Hypercholesterolemia 05/11/2011     Priority: Medium     Gout 04/14/2010     Priority: Medium     Benign neoplasm of colon 09/18/2009     Priority: Medium     Overview:   benign       Hearing loss 09/18/2009     Priority: Medium     HTN (hypertension) 12/24/2007     Priority: Medium     Overview:   IMO Update 10/11       Past Medical History:   Diagnosis Date     Electrocution     H/o electrocution 13,800 volts     Essential (primary) hypertension     Hypertension     Gastro-esophageal reflux disease without esophagitis     G E R D      Current Outpatient Medications   Medication Sig Dispense Refill     amLODIPine (NORVASC) 5 MG tablet Take 1 tablet (5 mg) by mouth daily 30 tablet 5     aspirin 325 MG tablet Take 325 mg by mouth daily       atenolol (TENORMIN) 50 MG tablet Take 1 tablet  "(50 mg) by mouth daily 90 tablet 3     hydrochlorothiazide (HYDRODIURIL) 25 MG tablet Take 1 tablet (25 mg) by mouth daily 90 tablet 3     HYDROcodone-acetaminophen (NORCO) 5-325 MG tablet Take 1 tablet by mouth every 6 hours as needed 45 tablet 0     ranitidine (ZANTAC) 150 MG tablet Take 1 tablet (150 mg) by mouth 2 times daily 180 tablet 3     simvastatin (ZOCOR) 20 MG tablet Take 1 tablet (20 mg) by mouth daily 90 tablet 3       Review of Systems     OBJECTIVE:     /68   Pulse 60   Temp 97.4  F (36.3  C)   Resp 16   Ht 1.816 m (5' 11.5\")   Wt 109.9 kg (242 lb 3.2 oz)   BMI 33.31 kg/m    Body mass index is 33.31 kg/m .  Physical Exam   Constitutional: He appears well-developed.   Cardiovascular: Normal rate and regular rhythm.   Pulmonary/Chest: Effort normal and breath sounds normal.   Neurological: He is alert.       Diagnostic Test Results:  none     ASSESSMENT/PLAN:         1. Essential hypertension  Chart here indicates good control at home blood pressure indicates poor control.  Advised we could do 1-2 things either continue with blood pressure checks here and bring his cuff at his next visit.  Or adjust his medication.  We have decided just to keep his medication the same.  He will continue with blood pressure checks here and bring his cuff.        Abundio Abdi MD  [facility]  "

## 2019-07-09 DIAGNOSIS — K21.9 GASTROESOPHAGEAL REFLUX DISEASE WITHOUT ESOPHAGITIS: ICD-10-CM

## 2019-07-09 NOTE — TELEPHONE ENCOUNTER
"Requested Prescriptions   Pending Prescriptions Disp Refills     ranitidine (ZANTAC) 150 MG tablet [Pharmacy Med Name: RANITIDINE  MG  TAB] 180 tablet 3     Sig: TAKE ONE TABLET ORALLY TWO TIMES A DAY       H2 Blockers Protocol Passed - 7/9/2019 10:36 AM        Passed - Patient is age 12 or older        Passed - Recent (12 mo) or future (30 days) visit within the authorizing provider's specialty     Patient had office visit in the last 12 months or has a visit in the next 30 days with authorizing provider or within the authorizing provider's specialty.  See \"Patient Info\" tab in inbasket, or \"Choose Columns\" in Meds & Orders section of the refill encounter.              Passed - Medication is active on med list        Last reviewed 9/2018 ov since then as well. Prescription approved per [facility] Refill Protocol.  GERD diagnosis Mony Pemberton RN on 7/9/2019 at 4:00 PM   "

## 2019-07-30 DIAGNOSIS — I10 ESSENTIAL HYPERTENSION: Primary | ICD-10-CM

## 2019-08-02 RX ORDER — AMLODIPINE BESYLATE 5 MG/1
TABLET ORAL
Qty: 90 TABLET | Refills: 3 | Status: SHIPPED | OUTPATIENT
Start: 2019-08-02 | End: 2019-09-04

## 2019-08-02 NOTE — TELEPHONE ENCOUNTER
[facility] in [place] sent Rx request for the following:      AMLODIPINE BESYLATE 5 MG TA 5 TAB  Sig: TAKE ONE TABLET ORALLY EVERY DAY  Last Prescription Date:   3/9/19  Last Fill Qty/Refills:         30, R-5    Last Office Visit:              5/23/19  Future Office visit:           None.  Routing refill request to provider for review/approval because:  Calcium Channel Blockers Protocol Failed8/2 8:46 AM   Normal serum creatinine on file in past 12 months     Unable to complete prescription refill per RN Medication Refill Policy. Frieda Hobbs RN .............. 8/2/2019  9:00 AM

## 2019-08-05 ENCOUNTER — HOSPITAL ENCOUNTER (OUTPATIENT)
Dept: GENERAL RADIOLOGY | Facility: OTHER | Age: 74
Discharge: HOME | End: 2019-08-05
Attending: FAMILY MEDICINE | Admitting: FAMILY MEDICINE
Payer: COMMERCIAL

## 2019-08-05 ENCOUNTER — OFFICE VISIT (OUTPATIENT)
Dept: FAMILY MEDICINE | Facility: OTHER | Age: 74
End: 2019-08-05
Attending: FAMILY MEDICINE
Payer: COMMERCIAL

## 2019-08-05 ENCOUNTER — HOSPITAL ENCOUNTER (OUTPATIENT)
Dept: GENERAL RADIOLOGY | Facility: OTHER | Age: 74
End: 2019-08-05
Attending: FAMILY MEDICINE
Payer: COMMERCIAL

## 2019-08-05 VITALS
HEIGHT: 72 IN | TEMPERATURE: 98.2 F | SYSTOLIC BLOOD PRESSURE: 132 MMHG | DIASTOLIC BLOOD PRESSURE: 68 MMHG | HEART RATE: 64 BPM | RESPIRATION RATE: 20 BRPM | BODY MASS INDEX: 31.77 KG/M2 | WEIGHT: 234.6 LBS

## 2019-08-05 DIAGNOSIS — M79.645 PAIN OF FINGER OF LEFT HAND: ICD-10-CM

## 2019-08-05 DIAGNOSIS — M47.816 DEGENERATIVE JOINT DISEASE OF CERVICAL AND LUMBAR SPINE: ICD-10-CM

## 2019-08-05 DIAGNOSIS — M47.812 DEGENERATIVE JOINT DISEASE OF CERVICAL AND LUMBAR SPINE: ICD-10-CM

## 2019-08-05 DIAGNOSIS — M25.532 LEFT WRIST PAIN: ICD-10-CM

## 2019-08-05 PROBLEM — M79.661 PAIN OF RIGHT LOWER LEG: Status: RESOLVED | Noted: 2019-03-01 | Resolved: 2019-08-05

## 2019-08-05 PROCEDURE — 73130 X-RAY EXAM OF HAND: CPT | Mod: LT

## 2019-08-05 PROCEDURE — G0463 HOSPITAL OUTPT CLINIC VISIT: HCPCS | Mod: 25

## 2019-08-05 PROCEDURE — G0463 HOSPITAL OUTPT CLINIC VISIT: HCPCS

## 2019-08-05 PROCEDURE — 99214 OFFICE O/P EST MOD 30 MIN: CPT | Performed by: FAMILY MEDICINE

## 2019-08-05 PROCEDURE — 73110 X-RAY EXAM OF WRIST: CPT | Mod: LT

## 2019-08-05 RX ORDER — HYDROCODONE BITARTRATE AND ACETAMINOPHEN 5; 325 MG/1; MG/1
1 TABLET ORAL EVERY 6 HOURS PRN
Qty: 45 TABLET | Refills: 0 | Status: SHIPPED | OUTPATIENT
Start: 2019-08-05 | End: 2019-10-18

## 2019-08-05 ASSESSMENT — PATIENT HEALTH QUESTIONNAIRE - PHQ9: SUM OF ALL RESPONSES TO PHQ QUESTIONS 1-9: 0

## 2019-08-05 ASSESSMENT — PAIN SCALES - GENERAL: PAINLEVEL: EXTREME PAIN (9)

## 2019-08-05 ASSESSMENT — MIFFLIN-ST. JEOR: SCORE: 1829.2

## 2019-08-05 NOTE — NURSING NOTE
Patient here for left wrist pain for the past 1 week. He was using the left wrist with an air ratchet and ist twisted the left wrist. Medication Reconciliation: complete.    Brigette Garnica LPN  8/5/2019 2:33 PM

## 2019-08-07 NOTE — PROGRESS NOTES
SUBJECTIVE:   Bryan Kearney is a 73 year old male who presents to clinic today for the following health issues: Left wrist injury    Patient arrives here for left wrist injury.  He was using air ratchet when it got hung up and twisted his wrist.  This occurred about 1 week ago.  Became swollen.  He heard some snaps.  He is wrapped in an Ace wrap but reports really no improvement.  Very tender on the dorsum.  He has been using Aleve with very little relief patient is also requesting a refill of his hydrocodone.  Last prescription was for March 2019 for 45 tablets.        Patient Active Problem List    Diagnosis Date Noted     Left wrist pain 08/05/2019     Priority: Medium     Pain of finger of left hand 08/05/2019     Priority: Medium     Hip pain, left 02/11/2019     Priority: Medium     Irritant contact dermatitis due to other chemical products 02/11/2019     Priority: Medium     Acute cystitis 10/10/2018     Priority: Medium     Esophageal reflux 09/05/2018     Priority: Medium     Degenerative joint disease of cervical and lumbar spine 09/05/2018     Priority: Medium     Actinic keratosis 11/29/2015     Priority: Medium     Solar keratosis 05/29/2015     Priority: Medium     Colonoscopy refused 07/29/2013     Priority: Medium     Overview:   Discussed and refused 07/2013       Hypercholesterolemia 05/11/2011     Priority: Medium     Gout 04/14/2010     Priority: Medium     Benign neoplasm of colon 09/18/2009     Priority: Medium     Overview:   benign       Hearing loss 09/18/2009     Priority: Medium     HTN (hypertension) 12/24/2007     Priority: Medium     Overview:   IMO Update 10/11       Past Medical History:   Diagnosis Date     Electrocution     H/o electrocution 13,800 volts     Essential (primary) hypertension     Hypertension     Gastro-esophageal reflux disease without esophagitis     G E R D      Past Surgical History:   Procedure Laterality Date     APPENDECTOMY OPEN      No Comments Provided  "    ARTHROSCOPY SHOULDER ROTATOR CUFF REPAIR      No Comments Provided     COLONOSCOPY      06/04/2001,Next colonoscopy due in 2011.     OTHER SURGICAL HISTORY      ,CAROTID ENDARDECTOMY,Left carotid endarterectomy secondary to high grade stenosis     OTHER SURGICAL HISTORY      ,CAROTID ENDARDECTOMY,Bilateral endarterectomy     Social History     Social History Narrative    . Retired .    3 nchildren    Alcohol Use - yes  occ beer    Preload  7/26/2013     Current Outpatient Medications   Medication Sig Dispense Refill     amLODIPine (NORVASC) 5 MG tablet TAKE ONE TABLET ORALLY EVERY DAY 90 tablet 3     aspirin 325 MG tablet Take 325 mg by mouth daily       atenolol (TENORMIN) 50 MG tablet Take 1 tablet (50 mg) by mouth daily 90 tablet 3     hydrochlorothiazide (HYDRODIURIL) 25 MG tablet Take 1 tablet (25 mg) by mouth daily 90 tablet 3     HYDROcodone-acetaminophen (NORCO) 5-325 MG tablet Take 1 tablet by mouth every 6 hours as needed 45 tablet 0     ranitidine (ZANTAC) 150 MG tablet TAKE ONE TABLET ORALLY TWO TIMES A  tablet 1     simvastatin (ZOCOR) 20 MG tablet Take 1 tablet (20 mg) by mouth daily 90 tablet 3     No Known Allergies    Review of Systems     OBJECTIVE:     /68   Pulse 64   Temp 98.2  F (36.8  C)   Resp 20   Ht 1.816 m (5' 11.5\")   Wt 106.4 kg (234 lb 9.6 oz)   BMI 32.26 kg/m    Body mass index is 32.26 kg/m .  Physical Exam   Constitutional: He appears well-developed.   HENT:   Head: Normocephalic.   Musculoskeletal: Normal range of motion. He exhibits edema.   There is quite a bit of swelling in pain on palpation to the dorsum of the wrist.  No pain in his snuffbox.  Decreased range of motion because of pain.   Neurological: He is alert.           ASSESSMENT/PLAN:         1. Left wrist pain  X-rays were negative for fracture.  I suspect ligamentous injury.  Will place him in a spica wrist splint.  Follow-up in 1 week.  - XR Wrist Left G/E 3 Views; " Future  - WRIST SPLINT    2. Pain of finger of left hand    - XR Hand Left G/E 3 Views; Future  - WRIST SPLINT    3. Degenerative joint disease of cervical and lumbar spine  Patient is also requesting a refill of his hydrocodone.  He continues to use it as needed very rarely.  States he only use it a few times a week if needed or he may overexert himself.  - HYDROcodone-acetaminophen (NORCO) 5-325 MG tablet; Take 1 tablet by mouth every 6 hours as needed  Dispense: 45 tablet; Refill: 0      Abundio Abdi MD  [facility] AND [facility]

## 2019-08-12 ENCOUNTER — OFFICE VISIT (OUTPATIENT)
Dept: FAMILY MEDICINE | Facility: OTHER | Age: 74
End: 2019-08-12
Attending: FAMILY MEDICINE
Payer: COMMERCIAL

## 2019-08-12 VITALS
RESPIRATION RATE: 20 BRPM | SYSTOLIC BLOOD PRESSURE: 138 MMHG | HEART RATE: 60 BPM | BODY MASS INDEX: 32.37 KG/M2 | TEMPERATURE: 97.8 F | HEIGHT: 72 IN | WEIGHT: 239 LBS | DIASTOLIC BLOOD PRESSURE: 72 MMHG

## 2019-08-12 DIAGNOSIS — M25.532 LEFT WRIST PAIN: ICD-10-CM

## 2019-08-12 DIAGNOSIS — L57.0 ACTINIC KERATOSIS: Primary | ICD-10-CM

## 2019-08-12 PROCEDURE — G0463 HOSPITAL OUTPT CLINIC VISIT: HCPCS

## 2019-08-12 PROCEDURE — 99213 OFFICE O/P EST LOW 20 MIN: CPT | Performed by: FAMILY MEDICINE

## 2019-08-12 RX ORDER — FLUOROURACIL 50 MG/G
CREAM TOPICAL 2 TIMES DAILY
Qty: 40 G | Refills: 1 | Status: SHIPPED | OUTPATIENT
Start: 2019-08-12 | End: 2020-09-28

## 2019-08-12 ASSESSMENT — PATIENT HEALTH QUESTIONNAIRE - PHQ9: SUM OF ALL RESPONSES TO PHQ QUESTIONS 1-9: 0

## 2019-08-12 ASSESSMENT — PAIN SCALES - GENERAL: PAINLEVEL: MILD PAIN (3)

## 2019-08-12 ASSESSMENT — MIFFLIN-ST. JEOR: SCORE: 1849.16

## 2019-08-12 NOTE — NURSING NOTE
Patient here for follow up on the left wrist. Has been wearing brace feels its getting better. Medication Reconciliation: complete.    Brigette Garnica LPN  8/12/2019 1:45 PM

## 2019-08-14 NOTE — PROGRESS NOTES
SUBJECTIVE:   Bryan Kearney is a 73 year old male who presents to clinic today for the following health issues: Recheck wrist    Patient arrives here for recheck wrist.  States is dramatically improved since being immobilized.  He rates the pain as a 3 out of 10.  He is getting more flexion extension.        Patient Active Problem List    Diagnosis Date Noted     Left wrist pain 08/05/2019     Priority: Medium     Pain of finger of left hand 08/05/2019     Priority: Medium     Hip pain, left 02/11/2019     Priority: Medium     Irritant contact dermatitis due to other chemical products 02/11/2019     Priority: Medium     Acute cystitis 10/10/2018     Priority: Medium     Esophageal reflux 09/05/2018     Priority: Medium     Degenerative joint disease of cervical and lumbar spine 09/05/2018     Priority: Medium     Actinic keratosis 11/29/2015     Priority: Medium     Solar keratosis 05/29/2015     Priority: Medium     Colonoscopy refused 07/29/2013     Priority: Medium     Overview:   Discussed and refused 07/2013       Hypercholesterolemia 05/11/2011     Priority: Medium     Gout 04/14/2010     Priority: Medium     Benign neoplasm of colon 09/18/2009     Priority: Medium     Overview:   benign       Hearing loss 09/18/2009     Priority: Medium     HTN (hypertension) 12/24/2007     Priority: Medium     Overview:   IMO Update 10/11       Past Medical History:   Diagnosis Date     Electrocution     H/o electrocution 13,800 volts     Essential (primary) hypertension     Hypertension     Gastro-esophageal reflux disease without esophagitis     G E R D      Past Surgical History:   Procedure Laterality Date     APPENDECTOMY OPEN      No Comments Provided     ARTHROSCOPY SHOULDER ROTATOR CUFF REPAIR      No Comments Provided     COLONOSCOPY      06/04/2001,Next colonoscopy due in 2011.     OTHER SURGICAL HISTORY      ,CAROTID ENDARDECTOMY,Left carotid endarterectomy secondary to high grade stenosis     OTHER  "SURGICAL HISTORY      ,CAROTID ENDARDECTOMY,Bilateral endarterectomy     No Known Allergies    Review of Systems     OBJECTIVE:     /72   Pulse 60   Temp 97.8  F (36.6  C)   Resp 20   Ht 1.816 m (5' 11.5\")   Wt 108.4 kg (239 lb)   BMI 32.87 kg/m    Body mass index is 32.87 kg/m .  Physical Exam   Constitutional: He appears well-developed.   Musculoskeletal:   Range of motion has improved by about 50%.  Continues to be tenderness but very minimal in the snuffbox   Neurological: He is alert.       Diagnostic Test Results:  none     ASSESSMENT/PLAN:         1. Actinic keratosis  Patient requests refill.  - fluorouracil (EFUDEX) 5 % external cream; Apply topically 2 times daily  Dispense: 40 g; Refill: 1    2. Left wrist pain  Likely tendinitis.  With improvement.  Continue increasing range of motion.        Abundio Abdi MD  [facility]  "

## 2019-09-04 ENCOUNTER — OFFICE VISIT (OUTPATIENT)
Dept: FAMILY MEDICINE | Facility: OTHER | Age: 74
End: 2019-09-04
Attending: FAMILY MEDICINE
Payer: COMMERCIAL

## 2019-09-04 VITALS
WEIGHT: 241.2 LBS | DIASTOLIC BLOOD PRESSURE: 72 MMHG | TEMPERATURE: 98.6 F | BODY MASS INDEX: 32.67 KG/M2 | RESPIRATION RATE: 20 BRPM | SYSTOLIC BLOOD PRESSURE: 130 MMHG | HEIGHT: 72 IN | HEART RATE: 60 BPM

## 2019-09-04 DIAGNOSIS — I10 ESSENTIAL HYPERTENSION: ICD-10-CM

## 2019-09-04 DIAGNOSIS — M79.645 PAIN OF FINGER OF LEFT HAND: Primary | ICD-10-CM

## 2019-09-04 PROCEDURE — G0463 HOSPITAL OUTPT CLINIC VISIT: HCPCS

## 2019-09-04 PROCEDURE — 99213 OFFICE O/P EST LOW 20 MIN: CPT | Performed by: FAMILY MEDICINE

## 2019-09-04 RX ORDER — AMLODIPINE BESYLATE 5 MG/1
TABLET ORAL
Qty: 90 TABLET | Refills: 3 | Status: SHIPPED | OUTPATIENT
Start: 2019-09-04 | End: 2020-04-13

## 2019-09-04 ASSESSMENT — ANXIETY QUESTIONNAIRES
4. TROUBLE RELAXING: NOT AT ALL
2. NOT BEING ABLE TO STOP OR CONTROL WORRYING: NOT AT ALL
5. BEING SO RESTLESS THAT IT IS HARD TO SIT STILL: NOT AT ALL
1. FEELING NERVOUS, ANXIOUS, OR ON EDGE: NOT AT ALL
7. FEELING AFRAID AS IF SOMETHING AWFUL MIGHT HAPPEN: NOT AT ALL
3. WORRYING TOO MUCH ABOUT DIFFERENT THINGS: NOT AT ALL
6. BECOMING EASILY ANNOYED OR IRRITABLE: NOT AT ALL
GAD7 TOTAL SCORE: 0

## 2019-09-04 ASSESSMENT — PATIENT HEALTH QUESTIONNAIRE - PHQ9: SUM OF ALL RESPONSES TO PHQ QUESTIONS 1-9: 0

## 2019-09-04 ASSESSMENT — PAIN SCALES - GENERAL: PAINLEVEL: MODERATE PAIN (4)

## 2019-09-04 ASSESSMENT — MIFFLIN-ST. JEOR: SCORE: 1854.14

## 2019-09-04 NOTE — NURSING NOTE
Patient presents to the clinic for a follow up for his left arm. Patient states his arm is not getting any better.  Medication Reconciliation Completed.    Praful Lnagley LPN  9/4/2019 10:43 AM

## 2019-09-05 ASSESSMENT — ANXIETY QUESTIONNAIRES: GAD7 TOTAL SCORE: 0

## 2019-09-05 NOTE — PROGRESS NOTES
SUBJECTIVE:   Bryan Kearney is a 74 year old male who presents to clinic today for the following health issues: Follow-up wrist sprain    Patient arrives here for follow-up wrist sprain.  States that the wrist is improving except the base of his thumb.  Continues to be painful.  He has been using his brace and finds that he has lost some strength in his .  Also pain at the base of his first MP joint.        Patient Active Problem List    Diagnosis Date Noted     Left wrist pain 08/05/2019     Priority: Medium     Pain of finger of left hand 08/05/2019     Priority: Medium     Hip pain, left 02/11/2019     Priority: Medium     Irritant contact dermatitis due to other chemical products 02/11/2019     Priority: Medium     Acute cystitis 10/10/2018     Priority: Medium     Esophageal reflux 09/05/2018     Priority: Medium     Degenerative joint disease of cervical and lumbar spine 09/05/2018     Priority: Medium     Actinic keratosis 11/29/2015     Priority: Medium     Solar keratosis 05/29/2015     Priority: Medium     Colonoscopy refused 07/29/2013     Priority: Medium     Overview:   Discussed and refused 07/2013       Hypercholesterolemia 05/11/2011     Priority: Medium     Gout 04/14/2010     Priority: Medium     Benign neoplasm of colon 09/18/2009     Priority: Medium     Overview:   benign       Hearing loss 09/18/2009     Priority: Medium     HTN (hypertension) 12/24/2007     Priority: Medium     Overview:   IMO Update 10/11       Past Medical History:   Diagnosis Date     Electrocution     H/o electrocution 13,800 volts     Essential (primary) hypertension     Hypertension     Gastro-esophageal reflux disease without esophagitis     G E R D      Past Surgical History:   Procedure Laterality Date     APPENDECTOMY OPEN      No Comments Provided     ARTHROSCOPY SHOULDER ROTATOR CUFF REPAIR      No Comments Provided     COLONOSCOPY      06/04/2001,Next colonoscopy due in 2011.     OTHER SURGICAL HISTORY    "   ,CAROTID ENDARDECTOMY,Left carotid endarterectomy secondary to high grade stenosis     OTHER SURGICAL HISTORY      ,CAROTID ENDARDECTOMY,Bilateral endarterectomy       Review of Systems     OBJECTIVE:     /72 (BP Location: Right arm, Patient Position: Sitting, Cuff Size: Adult Large)   Pulse 60   Temp 98.6  F (37  C)   Resp 20   Ht 1.816 m (5' 11.5\")   Wt 109.4 kg (241 lb 3.2 oz)   BMI 33.17 kg/m    Body mass index is 33.17 kg/m .  Physical Exam   Musculoskeletal:   He has good range of motion to his thumb.  Pain on palpation to the base of his thumb.  Good strength present.   Neurological: He is alert.           ASSESSMENT/PLAN:         1. Essential hypertension  Refill  - amLODIPine (NORVASC) 5 MG tablet; TAKE ONE TABLET ORALLY EVERY DAY  Dispense: 90 tablet; Refill: 3    2. Pain of finger of left hand  Weakness may due to inactivity.  Recommend he start moving his thumb.  I do not believe there is any ligament tears.  Likely strain.  If he does not get improvement over the next couple weeks consider occupational therapy follow-up.      Abunido Abdi MD  [facility]  "

## 2019-09-26 DIAGNOSIS — E78.00 HYPERCHOLESTEROLEMIA: Primary | ICD-10-CM

## 2019-09-30 ENCOUNTER — OFFICE VISIT (OUTPATIENT)
Dept: FAMILY MEDICINE | Facility: OTHER | Age: 74
End: 2019-09-30
Attending: NURSE PRACTITIONER
Payer: COMMERCIAL

## 2019-09-30 VITALS
WEIGHT: 241.6 LBS | TEMPERATURE: 97.5 F | OXYGEN SATURATION: 96 % | RESPIRATION RATE: 16 BRPM | HEIGHT: 72 IN | DIASTOLIC BLOOD PRESSURE: 70 MMHG | SYSTOLIC BLOOD PRESSURE: 110 MMHG | BODY MASS INDEX: 32.72 KG/M2 | HEART RATE: 68 BPM

## 2019-09-30 DIAGNOSIS — E78.00 HYPERCHOLESTEROLEMIA: ICD-10-CM

## 2019-09-30 DIAGNOSIS — I10 ESSENTIAL HYPERTENSION: Primary | ICD-10-CM

## 2019-09-30 DIAGNOSIS — M10.072 ACUTE IDIOPATHIC GOUT OF LEFT FOOT: Primary | ICD-10-CM

## 2019-09-30 DIAGNOSIS — I10 ESSENTIAL HYPERTENSION: ICD-10-CM

## 2019-09-30 PROCEDURE — 99214 OFFICE O/P EST MOD 30 MIN: CPT | Performed by: NURSE PRACTITIONER

## 2019-09-30 PROCEDURE — G0463 HOSPITAL OUTPT CLINIC VISIT: HCPCS

## 2019-09-30 RX ORDER — SIMVASTATIN 20 MG
20 TABLET ORAL DAILY
Qty: 90 TABLET | Refills: 3 | Status: CANCELLED | OUTPATIENT
Start: 2019-09-30

## 2019-09-30 RX ORDER — HYDROCHLOROTHIAZIDE 25 MG/1
25 TABLET ORAL DAILY
Qty: 90 TABLET | Refills: 3 | Status: CANCELLED | OUTPATIENT
Start: 2019-09-30

## 2019-09-30 RX ORDER — ATENOLOL 50 MG/1
50 TABLET ORAL DAILY
Qty: 90 TABLET | Refills: 3 | Status: CANCELLED | OUTPATIENT
Start: 2019-09-30

## 2019-09-30 RX ORDER — SIMVASTATIN 20 MG
TABLET ORAL
Qty: 90 TABLET | Refills: 3 | Status: SHIPPED | OUTPATIENT
Start: 2019-09-30 | End: 2020-04-13

## 2019-09-30 RX ORDER — INDOMETHACIN 50 MG/1
50 CAPSULE ORAL
Qty: 21 CAPSULE | Refills: 0 | Status: SHIPPED | OUTPATIENT
Start: 2019-09-30 | End: 2020-03-02

## 2019-09-30 ASSESSMENT — PAIN SCALES - GENERAL: PAINLEVEL: WORST PAIN (10)

## 2019-09-30 ASSESSMENT — MIFFLIN-ST. JEOR: SCORE: 1855.95

## 2019-09-30 NOTE — NURSING NOTE
"Chief Complaint   Patient presents with     Arthritis     Left foot     Gout  Refill all meds    Initial /70   Pulse 68   Temp 97.5  F (36.4  C) (Temporal)   Resp 16   Ht 1.816 m (5' 11.5\")   Wt 109.6 kg (241 lb 9.6 oz)   SpO2 96%   BMI 33.23 kg/m   Estimated body mass index is 33.23 kg/m  as calculated from the following:    Height as of this encounter: 1.816 m (5' 11.5\").    Weight as of this encounter: 109.6 kg (241 lb 9.6 oz).    Medication Reconciliation: complete      Norma J. Gosselin, LPN  "

## 2019-09-30 NOTE — TELEPHONE ENCOUNTER
[facility] sent Rx request for the following:      SIMVASTATIN 20 MG TAB 20 TAB  Sig: TAKE ONE TABLET ORALLY ONCE A DAY  Last Prescription Date:   9/5/18  Last Fill Qty/Refills:         90, R-3     Last Office Visit:              Today (S[facility]- incomplete note)  Future Office visit:           None.  Routing refill request to provider for review/approval because:  Statins Protocol Failed9/30 2:59 PM   LDL on file in past 12 months     Patient has lab only appointment scheduled for 10/2 and lipid panel ordered. Will route to PCP for consideration of 90-day plus refill.     Unable to complete prescription refill per RN Medication Refill Policy. Frieda Hobbs RN .............. 9/30/2019  3:06 PM

## 2019-09-30 NOTE — PROGRESS NOTES
Subjective     Bryan Kearney is a 74 year old male who presents to clinic today for the following health issues:    HPI   Joint Pain    Onset: Friday    Description:   Location: L foot  Character: Sharp, Dull ache, Stabbing, Gnawing, Burning and Fullness    Intensity: severe    Progression of Symptoms: worse    Accompanying Signs & Symptoms:  Other symptoms: numbness, tingling, warmth, swelling and redness    History:   Previous similar pain: YES- with previous gout flares      Precipitating factors:   Trauma or overuse: no     Alleviating factors:  Improved by: nothing    Therapies Tried and outcome: 2 pills of allopurinol, pain pill      Patient Active Problem List   Diagnosis     Actinic keratosis     Benign neoplasm of colon     Colonoscopy refused     Esophageal reflux     Gout     Hearing loss     HTN (hypertension)     Hypercholesterolemia     Solar keratosis     Degenerative joint disease of cervical and lumbar spine     Acute cystitis     Hip pain, left     Irritant contact dermatitis due to other chemical products     Left wrist pain     Pain of finger of left hand     Past Surgical History:   Procedure Laterality Date     APPENDECTOMY OPEN      No Comments Provided     ARTHROSCOPY SHOULDER ROTATOR CUFF REPAIR      No Comments Provided     COLONOSCOPY      06/04/2001,Next colonoscopy due in 2011.     OTHER SURGICAL HISTORY      ,CAROTID ENDARDECTOMY,Left carotid endarterectomy secondary to high grade stenosis     OTHER SURGICAL HISTORY      ,CAROTID ENDARDECTOMY,Bilateral endarterectomy       Social History     Tobacco Use     Smoking status: Current Some Day Smoker     Packs/day: 0.75     Years: 11.00     Pack years: 8.25     Smokeless tobacco: Never Used   Substance Use Topics     Alcohol use: Yes     Alcohol/week: 1.0 standard drinks     Comment: 1 a week      Family History   Problem Relation Age of Onset     Cancer Father         Cancer,Lymphoma     Colon Cancer Brother          "Cancer-colon         Current Outpatient Medications   Medication Sig Dispense Refill     amLODIPine (NORVASC) 5 MG tablet TAKE ONE TABLET ORALLY EVERY DAY 90 tablet 3     aspirin 325 MG tablet Take 325 mg by mouth daily       atenolol (TENORMIN) 50 MG tablet Take 1 tablet (50 mg) by mouth daily 90 tablet 3     hydrochlorothiazide (HYDRODIURIL) 25 MG tablet Take 1 tablet (25 mg) by mouth daily 90 tablet 3     HYDROcodone-acetaminophen (NORCO) 5-325 MG tablet Take 1 tablet by mouth every 6 hours as needed 45 tablet 0     indomethacin (INDOCIN) 50 MG capsule Take 1 capsule (50 mg) by mouth 3 times daily (with meals) for 7 days 21 capsule 0     ranitidine (ZANTAC) 150 MG tablet TAKE ONE TABLET ORALLY TWO TIMES A  tablet 1     fluorouracil (EFUDEX) 5 % external cream Apply topically 2 times daily (Patient not taking: Reported on 9/30/2019) 40 g 1     simvastatin (ZOCOR) 20 MG tablet TAKE ONE TABLET ORALLY ONCE A DAY 90 tablet 3     No Known Allergies    Reviewed and updated as needed this visit by Provider  Tobacco  Allergies  Meds  Problems  Med Hx  Surg Hx  Fam Hx  Soc Hx          Review of Systems   ROS COMP: As above      Objective    /70   Pulse 68   Temp 97.5  F (36.4  C) (Temporal)   Resp 16   Ht 1.816 m (5' 11.5\")   Wt 109.6 kg (241 lb 9.6 oz)   SpO2 96%   BMI 33.23 kg/m    Body mass index is 33.23 kg/m .  Physical Exam   GENERAL: healthy, alert and no distress  MS: decreased range of motion L MTP joints, moderate edema to L foot, tenderness to palpation over all MTP joints (mostly great toe) and toe deformity - L great toe with redness/warmth/swelling    Diagnostic Test Results:  Labs reviewed in Epic  none         Assessment & Plan     1. Essential hypertension  Stable on medications, refilled.   - atenolol (TENORMIN) 50 MG tablet; Take 1 tablet (50 mg) by mouth daily  Dispense: 90 tablet; Refill: 3  - hydrochlorothiazide (HYDRODIURIL) 25 MG tablet; Take 1 tablet (25 mg) by mouth " daily  Dispense: 90 tablet; Refill: 3    2. Hypercholesterolemia  Lipid panel completed today as previously ordered, stable on medications. Refilled today.   - simvastatin (ZOCOR) 20 MG tablet; Take 1 tablet (20 mg) by mouth daily  Dispense: 90 tablet; Refill: 3    3. Acute idiopathic gout of left foot  -indomethacin (INDOCIN) 50mg tablet; Take 1 tablet (50mg) by mouth 3 times daily (with meals) for 7 days  Dispense 21 tablet; Refill 0       Quyen Daniel NP  [facility]

## 2019-10-02 DIAGNOSIS — E78.00 HYPERCHOLESTEROLEMIA: ICD-10-CM

## 2019-10-02 DIAGNOSIS — I10 ESSENTIAL HYPERTENSION: ICD-10-CM

## 2019-10-02 LAB
ANION GAP SERPL CALCULATED.3IONS-SCNC: 7 MMOL/L (ref 3–14)
BUN SERPL-MCNC: 33 MG/DL (ref 7–25)
CALCIUM SERPL-MCNC: 9.5 MG/DL (ref 8.6–10.3)
CHLORIDE SERPL-SCNC: 101 MMOL/L (ref 98–107)
CHOLEST SERPL-MCNC: 155 MG/DL
CO2 SERPL-SCNC: 29 MMOL/L (ref 21–31)
CREAT SERPL-MCNC: 1.34 MG/DL (ref 0.7–1.3)
GFR SERPL CREATININE-BSD FRML MDRD: 52 ML/MIN/{1.73_M2}
GLUCOSE SERPL-MCNC: 119 MG/DL (ref 70–105)
HDLC SERPL-MCNC: 36 MG/DL (ref 23–92)
LDLC SERPL CALC-MCNC: 95 MG/DL
NONHDLC SERPL-MCNC: 119 MG/DL
POTASSIUM SERPL-SCNC: 4.2 MMOL/L (ref 3.5–5.1)
SODIUM SERPL-SCNC: 137 MMOL/L (ref 134–144)
TRIGL SERPL-MCNC: 119 MG/DL

## 2019-10-02 PROCEDURE — 36415 COLL VENOUS BLD VENIPUNCTURE: CPT | Mod: ZL | Performed by: FAMILY MEDICINE

## 2019-10-02 PROCEDURE — 80048 BASIC METABOLIC PNL TOTAL CA: CPT | Mod: ZL | Performed by: FAMILY MEDICINE

## 2019-10-02 PROCEDURE — 80061 LIPID PANEL: CPT | Mod: ZL | Performed by: FAMILY MEDICINE

## 2019-10-08 DIAGNOSIS — I10 ESSENTIAL HYPERTENSION: ICD-10-CM

## 2019-10-10 RX ORDER — ATENOLOL 50 MG/1
TABLET ORAL
Qty: 90 TABLET | Refills: 3 | Status: SHIPPED | OUTPATIENT
Start: 2019-10-10 | End: 2020-10-27

## 2019-10-10 RX ORDER — HYDROCHLOROTHIAZIDE 25 MG/1
TABLET ORAL
Qty: 90 TABLET | Refills: 3 | Status: SHIPPED | OUTPATIENT
Start: 2019-10-10 | End: 2020-10-08

## 2019-10-10 NOTE — TELEPHONE ENCOUNTER
[facility] sent Rx request for the following:      Hydrochlorothiazide 25 mg tablet      Last Prescription Date:   9/5/18  Last Fill Qty/Refills:         90, R-3    Last Office Visit:              9/4/19   Future Office visit:           None noted    Routing refill request to provider for review/approval because:    Requested Prescriptions   Pending Prescriptions Disp Refills     hydrochlorothiazide (HYDRODIURIL) 25 MG tablet [Pharmacy Med Name: HYDROCHLOROTHIAZIDE 25 MG T 25 TAB] 90 tablet 3     Sig: TAKE ONE TABLET ORALLY ONCE A DAY       Diuretics (Including Combos) Protocol Failed - 10/8/2019 12:18 PM        Failed - Normal serum creatinine on file in past 12 months     Recent Labs   Lab Test 10/02/19  0715   CR 1.34*           Unable to complete prescription refill per RNMedication Refill Policy.................... Frieda Spring RN ....................  10/10/2019   10:56 AM             Atenolol 50 mg tablet      Last Prescription Date:   9/5/19  Last Fill Qty/Refills:         90, R-3      Passed protocol.      Prescription approved per [facility] Refill Protocol.  Frieda Spring RN............................ 10/10/2019 10:56 AM

## 2019-10-18 ENCOUNTER — OFFICE VISIT (OUTPATIENT)
Dept: FAMILY MEDICINE | Facility: OTHER | Age: 74
End: 2019-10-18
Attending: FAMILY MEDICINE
Payer: COMMERCIAL

## 2019-10-18 VITALS
WEIGHT: 241.25 LBS | TEMPERATURE: 97.6 F | RESPIRATION RATE: 20 BRPM | HEIGHT: 72 IN | DIASTOLIC BLOOD PRESSURE: 80 MMHG | OXYGEN SATURATION: 95 % | HEART RATE: 71 BPM | BODY MASS INDEX: 32.68 KG/M2 | SYSTOLIC BLOOD PRESSURE: 132 MMHG

## 2019-10-18 DIAGNOSIS — M10.00 IDIOPATHIC GOUT, UNSPECIFIED CHRONICITY, UNSPECIFIED SITE: ICD-10-CM

## 2019-10-18 DIAGNOSIS — M47.812 DEGENERATIVE JOINT DISEASE OF CERVICAL AND LUMBAR SPINE: ICD-10-CM

## 2019-10-18 DIAGNOSIS — M47.816 DEGENERATIVE JOINT DISEASE OF CERVICAL AND LUMBAR SPINE: ICD-10-CM

## 2019-10-18 DIAGNOSIS — K21.9 GASTROESOPHAGEAL REFLUX DISEASE WITHOUT ESOPHAGITIS: Primary | ICD-10-CM

## 2019-10-18 PROCEDURE — 99214 OFFICE O/P EST MOD 30 MIN: CPT | Performed by: FAMILY MEDICINE

## 2019-10-18 PROCEDURE — G0463 HOSPITAL OUTPT CLINIC VISIT: HCPCS

## 2019-10-18 RX ORDER — HYDROCODONE BITARTRATE AND ACETAMINOPHEN 5; 325 MG/1; MG/1
1 TABLET ORAL EVERY 6 HOURS PRN
Qty: 28 TABLET | Refills: 0 | Status: SHIPPED | OUTPATIENT
Start: 2019-10-18 | End: 2020-03-02

## 2019-10-18 ASSESSMENT — ANXIETY QUESTIONNAIRES
7. FEELING AFRAID AS IF SOMETHING AWFUL MIGHT HAPPEN: NOT AT ALL
2. NOT BEING ABLE TO STOP OR CONTROL WORRYING: NOT AT ALL
6. BECOMING EASILY ANNOYED OR IRRITABLE: NOT AT ALL
3. WORRYING TOO MUCH ABOUT DIFFERENT THINGS: NOT AT ALL
5. BEING SO RESTLESS THAT IT IS HARD TO SIT STILL: NOT AT ALL
GAD7 TOTAL SCORE: 0
1. FEELING NERVOUS, ANXIOUS, OR ON EDGE: NOT AT ALL

## 2019-10-18 ASSESSMENT — PATIENT HEALTH QUESTIONNAIRE - PHQ9
5. POOR APPETITE OR OVEREATING: NOT AT ALL
SUM OF ALL RESPONSES TO PHQ QUESTIONS 1-9: 0

## 2019-10-18 ASSESSMENT — PAIN SCALES - GENERAL: PAINLEVEL: NO PAIN (0)

## 2019-10-18 ASSESSMENT — MIFFLIN-ST. JEOR: SCORE: 1854.36

## 2019-10-18 NOTE — NURSING NOTE
Patient presents to clinic today for left foot pain and medication management.     No LMP for male patient.  Medication Reconciliation: complete    Arabella Zuñiga LPN  10/18/2019 11:16 AM

## 2019-10-18 NOTE — PROGRESS NOTES
Follow-up gout  SUBJECTIVE:   Bryan Kearney is a 74 year old male who presents to clinic today for the following health issues: Follow-up gout discuss Zantac review recent letter and labs and refill hydrocodone    Patient arrives with the above request.  He states his gout has improved dramatically.  Asymptomatic.  States the medications worked well.  He also r brings with him a letter that I recently sent in.  Letter shows some abnormalities in his blood work but minimal.  This was reviewed with the patient and reassurance is given.  He also brings his Zantac.  He is aware that Zantac has been recalled.  He states that it is working well for his GERD.  Was wondering what other alternative he could use.  And finally in need of a refill of his hydrocodone.  He uses it for chronic arthritis.  Especially degenerative disease of the cervical and lumbar spine.        Patient Active Problem List    Diagnosis Date Noted     Left wrist pain 08/05/2019     Priority: Medium     Pain of finger of left hand 08/05/2019     Priority: Medium     Hip pain, left 02/11/2019     Priority: Medium     Irritant contact dermatitis due to other chemical products 02/11/2019     Priority: Medium     Acute cystitis 10/10/2018     Priority: Medium     Esophageal reflux 09/05/2018     Priority: Medium     Degenerative joint disease of cervical and lumbar spine 09/05/2018     Priority: Medium     Actinic keratosis 11/29/2015     Priority: Medium     Solar keratosis 05/29/2015     Priority: Medium     Colonoscopy refused 07/29/2013     Priority: Medium     Overview:   Discussed and refused 07/2013       Hypercholesterolemia 05/11/2011     Priority: Medium     Gout 04/14/2010     Priority: Medium     Benign neoplasm of colon 09/18/2009     Priority: Medium     Overview:   benign       Hearing loss 09/18/2009     Priority: Medium     HTN (hypertension) 12/24/2007     Priority: Medium     Overview:   IMO Update 10/11       Past Medical History:    Diagnosis Date     Electrocution     H/o electrocution 13,800 volts     Essential (primary) hypertension     Hypertension     Gastro-esophageal reflux disease without esophagitis     G E R D      Past Surgical History:   Procedure Laterality Date     APPENDECTOMY OPEN      No Comments Provided     ARTHROSCOPY SHOULDER ROTATOR CUFF REPAIR      No Comments Provided     COLONOSCOPY      06/04/2001,Next colonoscopy due in 2011.     OTHER SURGICAL HISTORY      ,CAROTID ENDARDECTOMY,Left carotid endarterectomy secondary to high grade stenosis     OTHER SURGICAL HISTORY      ,CAROTID ENDARDECTOMY,Bilateral endarterectomy     Social History     Patient does not qualify to have social determinant information on file (likely too young).   Social History Narrative    . Retired .    3 nchildren    Alcohol Use - yes  occ beer    Preload  7/26/2013     Current Outpatient Medications   Medication Sig Dispense Refill     amLODIPine (NORVASC) 5 MG tablet TAKE ONE TABLET ORALLY EVERY DAY 90 tablet 3     aspirin 325 MG tablet Take 325 mg by mouth daily       atenolol (TENORMIN) 50 MG tablet TAKE ONE TABLET ORALLY ONCE A DAY 90 tablet 3     fluorouracil (EFUDEX) 5 % external cream Apply topically 2 times daily 40 g 1     hydrochlorothiazide (HYDRODIURIL) 25 MG tablet TAKE ONE TABLET ORALLY ONCE A DAY 90 tablet 3     HYDROcodone-acetaminophen (NORCO) 5-325 MG tablet Take 1 tablet by mouth every 6 hours as needed 28 tablet 0     indomethacin (INDOCIN) 50 MG capsule Take 1 capsule (50 mg) by mouth 3 times daily (with meals) for 7 days 21 capsule 0     ranitidine (ZANTAC) 150 MG tablet TAKE ONE TABLET ORALLY TWO TIMES A  tablet 1     simvastatin (ZOCOR) 20 MG tablet TAKE ONE TABLET ORALLY ONCE A DAY 90 tablet 3     No Known Allergies    Review of Systems     OBJECTIVE:     /80 (BP Location: Right arm, Patient Position: Sitting, Cuff Size: Adult Regular)   Pulse 71   Temp 97.6  F (36.4  C)  "(Tympanic)   Resp 20   Ht 1.816 m (5' 11.5\")   Wt 109.4 kg (241 lb 4 oz)   SpO2 95%   BMI 33.18 kg/m    Body mass index is 33.18 kg/m .  Physical Exam  Constitutional:       Appearance: Normal appearance.   Neck:      Musculoskeletal: Normal range of motion and neck supple.   Cardiovascular:      Rate and Rhythm: Normal rate.      Heart sounds: No murmur.   Pulmonary:      Effort: Pulmonary effort is normal.      Breath sounds: Normal breath sounds.   Skin:     General: Skin is warm.   Neurological:      General: No focal deficit present.      Mental Status: He is alert.   Psychiatric:         Mood and Affect: Mood normal.         Diagnostic Test Results:  none     ASSESSMENT/PLAN:         1. Degenerative joint disease of cervical and lumbar spine  Refill patient uses approximately 28 every couple months.  - HYDROcodone-acetaminophen (NORCO) 5-325 MG tablet; Take 1 tablet by mouth every 6 hours as needed  Dispense: 28 tablet; Refill: 0    2. Gastroesophageal reflux disease without esophagitis  Advised patient to talk to pharmacy.  See if this is the ones that have been recalled.  We could use Tagamet if it has been recalled.  Her restart a proton pump inhibitor.    3. Idiopathic gout, unspecified chronicity, unspecified site  Resolving.        Abundio Abdi MD  [facility] AND hospitals  "

## 2019-10-19 ASSESSMENT — ANXIETY QUESTIONNAIRES: GAD7 TOTAL SCORE: 0

## 2020-03-02 ENCOUNTER — OFFICE VISIT (OUTPATIENT)
Dept: FAMILY MEDICINE | Facility: OTHER | Age: 75
End: 2020-03-02
Attending: FAMILY MEDICINE
Payer: COMMERCIAL

## 2020-03-02 VITALS
WEIGHT: 245.6 LBS | BODY MASS INDEX: 33.26 KG/M2 | DIASTOLIC BLOOD PRESSURE: 68 MMHG | HEART RATE: 52 BPM | TEMPERATURE: 97.4 F | SYSTOLIC BLOOD PRESSURE: 120 MMHG | RESPIRATION RATE: 16 BRPM | HEIGHT: 72 IN

## 2020-03-02 DIAGNOSIS — M47.812 DEGENERATIVE JOINT DISEASE OF CERVICAL AND LUMBAR SPINE: Primary | ICD-10-CM

## 2020-03-02 DIAGNOSIS — Z12.5 ENCOUNTER FOR SCREENING FOR MALIGNANT NEOPLASM OF PROSTATE: ICD-10-CM

## 2020-03-02 DIAGNOSIS — M47.816 DEGENERATIVE JOINT DISEASE OF CERVICAL AND LUMBAR SPINE: Primary | ICD-10-CM

## 2020-03-02 DIAGNOSIS — R35.0 BENIGN PROSTATIC HYPERPLASIA WITH URINARY FREQUENCY: ICD-10-CM

## 2020-03-02 DIAGNOSIS — N40.1 BENIGN PROSTATIC HYPERPLASIA WITH URINARY FREQUENCY: ICD-10-CM

## 2020-03-02 LAB — PSA SERPL-ACNC: 1.47 NG/ML

## 2020-03-02 PROCEDURE — G0103 PSA SCREENING: HCPCS | Mod: ZL | Performed by: FAMILY MEDICINE

## 2020-03-02 PROCEDURE — G0463 HOSPITAL OUTPT CLINIC VISIT: HCPCS

## 2020-03-02 PROCEDURE — 36415 COLL VENOUS BLD VENIPUNCTURE: CPT | Mod: ZL | Performed by: FAMILY MEDICINE

## 2020-03-02 PROCEDURE — 99214 OFFICE O/P EST MOD 30 MIN: CPT | Performed by: FAMILY MEDICINE

## 2020-03-02 RX ORDER — HYDROCODONE BITARTRATE AND ACETAMINOPHEN 5; 325 MG/1; MG/1
1 TABLET ORAL EVERY 6 HOURS PRN
Qty: 40 TABLET | Refills: 0 | Status: SHIPPED | OUTPATIENT
Start: 2020-03-02 | End: 2020-03-17

## 2020-03-02 RX ORDER — TAMSULOSIN HYDROCHLORIDE 0.4 MG/1
0.4 CAPSULE ORAL DAILY
Qty: 90 CAPSULE | Refills: 3 | Status: SHIPPED | OUTPATIENT
Start: 2020-03-02 | End: 2021-03-22

## 2020-03-02 ASSESSMENT — ENCOUNTER SYMPTOMS
BACK PAIN: 1
CHILLS: 0
DYSURIA: 0
FREQUENCY: 1
EYES NEGATIVE: 1
RECTAL PAIN: 0
DIFFICULTY URINATING: 1
PSYCHIATRIC NEGATIVE: 1
DIZZINESS: 0
FEVER: 0
RESPIRATORY NEGATIVE: 1

## 2020-03-02 ASSESSMENT — MIFFLIN-ST. JEOR: SCORE: 1874.09

## 2020-03-02 ASSESSMENT — PAIN SCALES - GENERAL: PAINLEVEL: EXTREME PAIN (8)

## 2020-03-02 ASSESSMENT — PATIENT HEALTH QUESTIONNAIRE - PHQ9: SUM OF ALL RESPONSES TO PHQ QUESTIONS 1-9: 0

## 2020-03-02 NOTE — PROGRESS NOTES
Back pain  SUBJECTIVE:   Bryan Kearney is a 74 year old male who presents to clinic today for the following health issues: Back pain    Patient arrives here for back pain and urinary frequency.  Patient was out ice fishing.  Over the last week.  Is developed increasing back pain along with his usual discomfort.  In the past patient is been given hydrocodone.  He was given 28 tablets back in October.  Patient does have a history of degenerative joint disease of the lumbar back.  He rates the pain as an 8 out of 10.  Uses it fairly sparingly.  States he has trouble getting out of his chairs.  But once he is standing and stretching his back it does improve slightly.  No changes in bowel or bladder habits but he does report urinary frequency.  Usually the back pain is worse with movement.  Until get stretched out.  There is no dysuria.  Patient does decline a prostate exam.        Patient Active Problem List    Diagnosis Date Noted     Benign prostatic hyperplasia with urinary frequency 03/02/2020     Priority: Medium     Left wrist pain 08/05/2019     Priority: Medium     Pain of finger of left hand 08/05/2019     Priority: Medium     Hip pain, left 02/11/2019     Priority: Medium     Irritant contact dermatitis due to other chemical products 02/11/2019     Priority: Medium     Acute cystitis 10/10/2018     Priority: Medium     Esophageal reflux 09/05/2018     Priority: Medium     Degenerative joint disease of cervical and lumbar spine 09/05/2018     Priority: Medium     Actinic keratosis 11/29/2015     Priority: Medium     Solar keratosis 05/29/2015     Priority: Medium     Colonoscopy refused 07/29/2013     Priority: Medium     Overview:   Discussed and refused 07/2013       Hypercholesterolemia 05/11/2011     Priority: Medium     Gout 04/14/2010     Priority: Medium     Benign neoplasm of colon 09/18/2009     Priority: Medium     Overview:   benign       Hearing loss 09/18/2009     Priority: Medium     HTN  (hypertension) 12/24/2007     Priority: Medium     Overview:   IMO Update 10/11       Past Medical History:   Diagnosis Date     Electrocution     H/o electrocution 13,800 volts     Essential (primary) hypertension     Hypertension     Gastro-esophageal reflux disease without esophagitis     G E R D      Past Surgical History:   Procedure Laterality Date     APPENDECTOMY OPEN      No Comments Provided     ARTHROSCOPY SHOULDER ROTATOR CUFF REPAIR      No Comments Provided     COLONOSCOPY      06/04/2001,Next colonoscopy due in 2011.     OTHER SURGICAL HISTORY      ,CAROTID ENDARDECTOMY,Left carotid endarterectomy secondary to high grade stenosis     OTHER SURGICAL HISTORY      ,CAROTID ENDARDECTOMY,Bilateral endarterectomy     Current Outpatient Medications   Medication Sig Dispense Refill     amLODIPine (NORVASC) 5 MG tablet TAKE ONE TABLET ORALLY EVERY DAY 90 tablet 3     aspirin 325 MG tablet Take 325 mg by mouth daily       atenolol (TENORMIN) 50 MG tablet TAKE ONE TABLET ORALLY ONCE A DAY 90 tablet 3     fluorouracil (EFUDEX) 5 % external cream Apply topically 2 times daily 40 g 1     hydrochlorothiazide (HYDRODIURIL) 25 MG tablet TAKE ONE TABLET ORALLY ONCE A DAY 90 tablet 3     HYDROcodone-acetaminophen (NORCO) 5-325 MG tablet Take 1 tablet by mouth every 6 hours as needed 40 tablet 0     simvastatin (ZOCOR) 20 MG tablet TAKE ONE TABLET ORALLY ONCE A DAY 90 tablet 3     tamsulosin (FLOMAX) 0.4 MG capsule Take 1 capsule (0.4 mg) by mouth daily 90 capsule 3     No Known Allergies    Review of Systems   Constitutional: Negative for chills and fever.   Eyes: Negative.    Respiratory: Negative.    Cardiovascular: Negative for chest pain.   Gastrointestinal: Negative for rectal pain.        Negative for any fecal soiling   Genitourinary: Positive for difficulty urinating and frequency. Negative for dysuria.   Musculoskeletal: Positive for back pain.   Neurological: Negative for dizziness.  "  Psychiatric/Behavioral: Negative.         OBJECTIVE:     /68   Pulse 52   Temp 97.4  F (36.3  C)   Resp 16   Ht 1.816 m (5' 11.5\")   Wt 111.4 kg (245 lb 9.6 oz)   BMI 33.78 kg/m    Body mass index is 33.78 kg/m .  Physical Exam  Constitutional:       Comments: Patient appears to be in moderate discomfort especially with standing up.   HENT:      Head: Normocephalic and atraumatic.   Cardiovascular:      Rate and Rhythm: Normal rate and regular rhythm.   Pulmonary:      Effort: Pulmonary effort is normal.   Genitourinary:     Comments: Declines prostate exam  Skin:     Comments: Pain is reproduced along the SI joint.  He does have muscles that appear to be spasming.   Neurological:      Mental Status: He is alert.   Psychiatric:         Mood and Affect: Mood normal.         Diagnostic Test Results:  none     ASSESSMENT/PLAN:         1. Degenerative joint disease of cervical and lumbar spine  Refill likely muscular in origin along with degenerative joint disease.  - HYDROcodone-acetaminophen (NORCO) 5-325 MG tablet; Take 1 tablet by mouth every 6 hours as needed  Dispense: 40 tablet; Refill: 0    2. Benign prostatic hyperplasia with urinary frequency  Start  - tamsulosin (FLOMAX) 0.4 MG capsule; Take 1 capsule (0.4 mg) by mouth daily  Dispense: 90 capsule; Refill: 3  - PSA Screen GH; Future  - PSA Screen GH    3. Encounter for screening for malignant neoplasm of prostate   Check  - PSA Screen GH; Future  - PSA Screen       Abundio Abdi MD  [facility]  "

## 2020-03-02 NOTE — LETTER
March 2, 2020      Bryan Kearney  [address] [place] [address]        Dear ,    We are writing to inform you of your test results.    Your test results fall within the expected range(s) or remain unchanged from previous results.  Please continue with current treatment plan.    Resulted Orders   PSA Screen GH   Result Value Ref Range    PSA Screen 1.470 <3.100 ng/mL      Comment:      The DXI Access PSAS WHO assay is a two site immunoenzymatic assay. Assay   values obtained with different assay methods cannot be used interchangeably   due to differences in assay methods and reagent specificity.         If you have any questions or concerns, please call the clinic at the number listed above.       Sincerely,        Abundio Abdi MD

## 2020-03-02 NOTE — NURSING NOTE
Patient here for lower back pain for a week or two. Medication Reconciliation: complete.    Brigette Garnica LPN  3/2/2020 8:29 AM

## 2020-03-17 ENCOUNTER — OFFICE VISIT (OUTPATIENT)
Dept: FAMILY MEDICINE | Facility: OTHER | Age: 75
End: 2020-03-17
Attending: FAMILY MEDICINE
Payer: COMMERCIAL

## 2020-03-17 ENCOUNTER — HOSPITAL ENCOUNTER (OUTPATIENT)
Dept: GENERAL RADIOLOGY | Facility: OTHER | Age: 75
End: 2020-03-17
Attending: FAMILY MEDICINE
Payer: COMMERCIAL

## 2020-03-17 VITALS
SYSTOLIC BLOOD PRESSURE: 120 MMHG | RESPIRATION RATE: 16 BRPM | OXYGEN SATURATION: 96 % | DIASTOLIC BLOOD PRESSURE: 68 MMHG | BODY MASS INDEX: 34.68 KG/M2 | TEMPERATURE: 97.5 F | WEIGHT: 252.2 LBS | HEART RATE: 65 BPM

## 2020-03-17 DIAGNOSIS — M54.50 LUMBAR BACK PAIN: ICD-10-CM

## 2020-03-17 DIAGNOSIS — M54.2 NECK PAIN: ICD-10-CM

## 2020-03-17 DIAGNOSIS — M54.50 LUMBAR BACK PAIN: Primary | ICD-10-CM

## 2020-03-17 PROCEDURE — 72040 X-RAY EXAM NECK SPINE 2-3 VW: CPT

## 2020-03-17 PROCEDURE — 72100 X-RAY EXAM L-S SPINE 2/3 VWS: CPT

## 2020-03-17 PROCEDURE — G0463 HOSPITAL OUTPT CLINIC VISIT: HCPCS

## 2020-03-17 PROCEDURE — 99214 OFFICE O/P EST MOD 30 MIN: CPT | Performed by: FAMILY MEDICINE

## 2020-03-17 RX ORDER — HYDROCODONE BITARTRATE AND ACETAMINOPHEN 7.5; 325 MG/1; MG/1
1 TABLET ORAL EVERY 4 HOURS PRN
Qty: 30 TABLET | Refills: 0 | Status: SHIPPED | OUTPATIENT
Start: 2020-03-17 | End: 2020-04-13

## 2020-03-17 ASSESSMENT — PAIN SCALES - GENERAL: PAINLEVEL: EXTREME PAIN (8)

## 2020-03-17 NOTE — NURSING NOTE
Patient here for back pain he says the pain pills are not helping. Medication Reconciliation: complete.    Brigette Garnica LPN  3/17/2020 1:20 PM

## 2020-03-18 NOTE — PROGRESS NOTES
SUBJECTIVE:   Bryan Kearney is a 74 year old male who presents to clinic today for the following health issues: Neck back pain.  Medication refill.    Patient arrives here for follow-up of neck and back pain.  He rates the pain as an 8 out of 10.  He reports really no improvement with his current hydro-codon.  Patient has significant degenerative joint disease of the lumbar and cervical spine.  Recently he was outside working in it got out of control.  He is wondering what else could be done.  States he is interested in physical therapy.  Or injections or surgery if it did help.        Patient Active Problem List    Diagnosis Date Noted     Benign prostatic hyperplasia with urinary frequency 03/02/2020     Priority: Medium     Left wrist pain 08/05/2019     Priority: Medium     Pain of finger of left hand 08/05/2019     Priority: Medium     Hip pain, left 02/11/2019     Priority: Medium     Irritant contact dermatitis due to other chemical products 02/11/2019     Priority: Medium     Acute cystitis 10/10/2018     Priority: Medium     Esophageal reflux 09/05/2018     Priority: Medium     Degenerative joint disease of cervical and lumbar spine 09/05/2018     Priority: Medium     Actinic keratosis 11/29/2015     Priority: Medium     Solar keratosis 05/29/2015     Priority: Medium     Colonoscopy refused 07/29/2013     Priority: Medium     Overview:   Discussed and refused 07/2013       Hypercholesterolemia 05/11/2011     Priority: Medium     Gout 04/14/2010     Priority: Medium     Benign neoplasm of colon 09/18/2009     Priority: Medium     Overview:   benign       Hearing loss 09/18/2009     Priority: Medium     HTN (hypertension) 12/24/2007     Priority: Medium     Overview:   IMO Update 10/11       Past Medical History:   Diagnosis Date     Electrocution     H/o electrocution 13,800 volts     Essential (primary) hypertension     Hypertension     Gastro-esophageal reflux disease without esophagitis     G E R D       Past Surgical History:   Procedure Laterality Date     APPENDECTOMY OPEN      No Comments Provided     ARTHROSCOPY SHOULDER ROTATOR CUFF REPAIR      No Comments Provided     COLONOSCOPY      06/04/2001,Next colonoscopy due in 2011.     OTHER SURGICAL HISTORY      ,CAROTID ENDARDECTOMY,Left carotid endarterectomy secondary to high grade stenosis     OTHER SURGICAL HISTORY      ,CAROTID ENDARDECTOMY,Bilateral endarterectomy     Current Outpatient Medications   Medication Sig Dispense Refill     amLODIPine (NORVASC) 5 MG tablet TAKE ONE TABLET ORALLY EVERY DAY 90 tablet 3     aspirin 325 MG tablet Take 325 mg by mouth daily       atenolol (TENORMIN) 50 MG tablet TAKE ONE TABLET ORALLY ONCE A DAY 90 tablet 3     fluorouracil (EFUDEX) 5 % external cream Apply topically 2 times daily 40 g 1     hydrochlorothiazide (HYDRODIURIL) 25 MG tablet TAKE ONE TABLET ORALLY ONCE A DAY 90 tablet 3     HYDROcodone-acetaminophen (NORCO) 7.5-325 MG per tablet Take 1 tablet by mouth every 4 hours as needed for severe pain 30 tablet 0     simvastatin (ZOCOR) 20 MG tablet TAKE ONE TABLET ORALLY ONCE A DAY 90 tablet 3     tamsulosin (FLOMAX) 0.4 MG capsule Take 1 capsule (0.4 mg) by mouth daily 90 capsule 3     No Known Allergies    Review of Systems     OBJECTIVE:     /68   Pulse 65   Temp 97.5  F (36.4  C)   Resp 16   Wt 114.4 kg (252 lb 3.2 oz)   SpO2 96%   BMI 34.68 kg/m    Body mass index is 34.68 kg/m .  Physical Exam  Constitutional:       Comments: Patient appears to be in moderate discomfort   HENT:      Nose: Nose normal.   Cardiovascular:      Rate and Rhythm: Normal rate and regular rhythm.   Pulmonary:      Effort: Pulmonary effort is normal.      Breath sounds: Normal breath sounds.   Neurological:      General: No focal deficit present.      Mental Status: He is alert.         Diagnostic Test Results:  none     ASSESSMENT/PLAN:         1. Lumbar back pain  Discussed with patient.  Will increase his  hydrocodone to 7.5 mg.  - XR Lumbar Spine 2/3 Views; Future  - HYDROcodone-acetaminophen (NORCO) 7.5-325 MG per tablet; Take 1 tablet by mouth every 4 hours as needed for severe pain  Dispense: 30 tablet; Refill: 0    2. Neck pain    - XR Cervical Spine 2/3 Views; Future  - HYDROcodone-acetaminophen (NORCO) 7.5-325 MG per tablet; Take 1 tablet by mouth every 4 hours as needed for severe pain  Dispense: 30 tablet; Refill: 0    Also had a discussion about other options.  Including injections.  Versus physical therapy versus consultation for consideration of spine therapy.  He states that he is been pretty much and active during the summer and he might benefit from it.  Consultation made.  25 minutes spent with the patient greater than 50% counseling coordinating care.  Abundio Abdi MD  [facility]

## 2020-03-21 ENCOUNTER — OFFICE VISIT (OUTPATIENT)
Dept: FAMILY MEDICINE | Facility: OTHER | Age: 75
End: 2020-03-21
Attending: FAMILY MEDICINE
Payer: COMMERCIAL

## 2020-03-21 VITALS
HEART RATE: 65 BPM | SYSTOLIC BLOOD PRESSURE: 136 MMHG | BODY MASS INDEX: 34.13 KG/M2 | TEMPERATURE: 97.9 F | DIASTOLIC BLOOD PRESSURE: 74 MMHG | OXYGEN SATURATION: 93 % | RESPIRATION RATE: 16 BRPM | HEIGHT: 72 IN | WEIGHT: 252 LBS

## 2020-03-21 DIAGNOSIS — M54.41 ACUTE BILATERAL LOW BACK PAIN WITH RIGHT-SIDED SCIATICA: Primary | ICD-10-CM

## 2020-03-21 PROCEDURE — G0463 HOSPITAL OUTPT CLINIC VISIT: HCPCS

## 2020-03-21 PROCEDURE — 99213 OFFICE O/P EST LOW 20 MIN: CPT | Performed by: PHYSICIAN ASSISTANT

## 2020-03-21 RX ORDER — METHOCARBAMOL 500 MG/1
500 TABLET, FILM COATED ORAL 3 TIMES DAILY
Qty: 12 TABLET | Refills: 0 | Status: SHIPPED | OUTPATIENT
Start: 2020-03-21 | End: 2020-12-11

## 2020-03-21 ASSESSMENT — PAIN SCALES - GENERAL: PAINLEVEL: MODERATE PAIN (5)

## 2020-03-21 ASSESSMENT — MIFFLIN-ST. JEOR: SCORE: 1903.12

## 2020-03-21 NOTE — PATIENT INSTRUCTIONS
Patient Education     Understanding Lumbar Radiculopathy    Lumbar radiculopathy is irritation or inflammation of a nerve root in the low back. It causes symptoms that spread out from the back down one or both legs. To understand this condition, it helps to understand the parts of the spine:    Vertebrae. These are bones that stack to form the spine. The lumbar spine contains the 5 bottom vertebrae.    Disks. These are soft pads of tissue between the vertebrae. They act as shock absorbers for the spine.    Spinal canal. This is a tunnel formed within the stacked vertebrae. In the lumbar spine, nerves run through this canal.    Nerves. These branch off and leave the spinal canal, traveling out to parts of the body. As they leave the spinal canal, nerves pass through openings between the vertebrae. The nerve root is the part of the nerve that is closest to the spinal canal.    Sciatic nerve. This is a large nerve formed from several nerve roots in the low back. This nerve extends down the back of the leg to the foot.  With lumbar radiculopathy, nerve roots in the low back become irritated. This leads to pain and symptoms. The sciatic nerve is commonly involved, so the condition is often called sciatica.  What causes lumbar radiculopathy?  Aging, injury, poor posture, extra body weight, and other issues can lead to problems in the low back. These problems may then irritate nerve roots. They include:    Damage to a disk in the lumbar spine. The damaged disk may then press on nearby nerve roots.    Degeneration from wear and tear, and aging. This can lead to narrowing (stenosis) of the openings between the vertebrae. The narrowed openings press on nerve roots as they leave the spinal canal.    Unstable spine. This is when a vertebra slips forward. It can then press on a nerve root.  Other, less common things can put pressure on nerves in the low back. These include diabetes, infection, or a tumor.  Symptoms of lumbar  radiculopathy  These include:    Pain in the low back    Pain, numbness, tingling, or weakness that travels into the buttocks, hip, groin, or leg    Muscle spasms  Treatment for lumbar radiculopathy  In most cases, your healthcare provider will first try treatments that help relieve symptoms. These may include:    Prescription and over-the-counter pain medicines. These help relieve pain, swelling, and irritation.    Limits on positions and activities that increase pain. But lying in bed or avoiding all movement is only recommended for a short period of time.    Physical therapy, including exercises and stretches. This helps decrease pain and increase movement and function.    Steroid shots into the lower back. This may help relieve symptoms for a time.    Weight-loss program. If you are overweight, losing extra pounds may help relieve symptoms.  In some cases, you may need surgery to fix the underlying problem. This depends on the cause, the symptoms, and how long the pain has lasted.  Possible complications  Over time, an irritated and inflamed nerve may become damaged. This may lead to long-lasting (permanent) numbness or weakness in your legs and feet. If symptoms change suddenly or get worse, be sure to let your healthcare provider know.  When to call your healthcare provider  Call your healthcare provider right away if you have any of these:    New pain or pain that gets worse    New or increasing weakness, tingling, or numbness in your leg or foot    Problems controlling your bladder or bowel   Date Last Reviewed: 3/10/2016    8382-7428 [employer]. [address] [place] [address], [place] [address]. All rights reserved. This information is not intended as a substitute for professional medical care. Always follow your healthcare professional's instructions.    Take Aleve twice a day (every 12 hours) WITH FOOD for today and tomorrow. Call Dr. Abdi's office on Monday.  Your kidneys are not completely  normal so it is not safe to take Aleve all the time, but it is fine for short term when your pain is this bad. You can continue the hydrocodone for pain. The muscle relaxer, methocarbamol, will make you very tired. You may want to use this only at bedtime but can take it up to three times daily.  If you have worsening pain or have trouble controlling your bowel or bladder, go the emergency room.

## 2020-03-21 NOTE — PROGRESS NOTES
"SUBJECTIVE:   Bryan Kearney is a 74 year old male presenting with a chief complaint of   Chief Complaint   Patient presents with     Musculoskeletal Problem     right leg     Nursing Notes:   Graciela Callaway LPN  3/21/2020  4:42 PM  Signed  Chief Complaint   Patient presents with     Musculoskeletal Problem     right leg     Patient is here for pain in the right leg that started yesterday. Patient states the whole leg goes numb. Patient states heat does not help and he has relief from pain when laying flat.     Initial /74   Pulse 65   Temp 97.9  F (36.6  C) (Tympanic)   Resp 16   Ht 1.816 m (5' 11.5\")   Wt 114.3 kg (252 lb)   SpO2 93%   BMI 34.66 kg/m   Estimated body mass index is 34.66 kg/m  as calculated from the following:    Height as of this encounter: 1.816 m (5' 11.5\").    Weight as of this encounter: 114.3 kg (252 lb).  Medication Reconciliation: complete    Graciela Callaway LPN    HPI: Bryan presents to [facility].  He is here with complaints of back pain and now radiating pain to the right buttock down the back of his right leg with numbness since yesterday.  It is painful to walk.  He has been having low back pain for years.  He has not had an MRI that he recalls but did have xrays this week with arthritis findings. His back was hurting him more at that time and he and his primary physician discussed physical therapy. He is waiting for referral. He says they also discussed muscle relaxers but those weren't prescribed at that visit.   That is what he is hoping to get today.  He has never had the shooting pain down the leg before.  No new problems with urinating (was prescribed tamsulosin and he mentions that)  and no bowel problems other than some occasional constipation from pain pills. He has been taking hydrodocone for pain but is still hurting.  No fevers or chills. He has been very busy smoking fish and is wanting to get back to that. If he lies flat on his back, he has no pain but " "he is a busy man and can't stay bedridden, he states.      Review of Systems  See HPI.    Past Medical History:   Diagnosis Date     Electrocution     H/o electrocution 13,800 volts     Essential (primary) hypertension     Hypertension     Gastro-esophageal reflux disease without esophagitis     G E R D     Family History   Problem Relation Age of Onset     Cancer Father         Cancer,Lymphoma     Colon Cancer Brother         Cancer-colon     Current Outpatient Medications   Medication Sig Dispense Refill     amLODIPine (NORVASC) 5 MG tablet TAKE ONE TABLET ORALLY EVERY DAY 90 tablet 3     aspirin 325 MG tablet Take 325 mg by mouth daily       atenolol (TENORMIN) 50 MG tablet TAKE ONE TABLET ORALLY ONCE A DAY 90 tablet 3     fluorouracil (EFUDEX) 5 % external cream Apply topically 2 times daily 40 g 1     hydrochlorothiazide (HYDRODIURIL) 25 MG tablet TAKE ONE TABLET ORALLY ONCE A DAY 90 tablet 3     HYDROcodone-acetaminophen (NORCO) 7.5-325 MG per tablet Take 1 tablet by mouth every 4 hours as needed for severe pain 30 tablet 0     methocarbamol (ROBAXIN) 500 MG tablet Take 1 tablet (500 mg) by mouth 3 times daily 12 tablet 0     simvastatin (ZOCOR) 20 MG tablet TAKE ONE TABLET ORALLY ONCE A DAY 90 tablet 3     tamsulosin (FLOMAX) 0.4 MG capsule Take 1 capsule (0.4 mg) by mouth daily 90 capsule 3     Social History     Tobacco Use     Smoking status: Former Smoker     Packs/day: 0.75     Years: 11.00     Pack years: 8.25     Smokeless tobacco: Never Used   Substance Use Topics     Alcohol use: Yes     Alcohol/week: 1.0 standard drinks     Comment: 1 a week        OBJECTIVE  /74   Pulse 65   Temp 97.9  F (36.6  C) (Tympanic)   Resp 16   Ht 1.816 m (5' 11.5\")   Wt 114.3 kg (252 lb)   SpO2 93%   BMI 34.66 kg/m      Physical Exam  Constitutional:       Comments: He is in obvious pain and seems uncomfortable.   Cardiovascular:      Rate and Rhythm: Normal rate and regular rhythm.      Heart sounds: No " murmur.   Pulmonary:      Effort: Pulmonary effort is normal.      Breath sounds: Normal breath sounds.   Musculoskeletal:      Comments: He is able to stand and he ambulates with a cane. Position changes are difficult but he is more comfortable once he stands (or sits) for a minute.  He has tenderness in the right sciatic notch and along the right back, around the SI joint and slightly superior. No tenderness over the spine. Seated straight leg raise is positive.  Further exam is deferred secondary to discomfort.     Neurological:      Mental Status: He is alert.         Labs:  No results found for this or any previous visit (from the past 24 hour(s)).      ASSESSMENT:      ICD-10-CM    1. Acute bilateral low back pain with right-sided sciatica  M54.41 methocarbamol (ROBAXIN) 500 MG tablet        PLAN:  Discussed likely pathophysiology.  Would highly recommend physical therapy and apparently this referral is in the works.  He can trial methocarbamol up to TID. Discussed side effect of somnolence and he may want to reserve this for only bedtime use.  Ice or heat to the back or alternate.  He can rest for another day, then  gentle stretches and some mild exercise (walking) is encouraged.  His renal function was slightly decreased at last check in October but this would not preclude the very short term use of NSAIDs during his more extreme pain. Discussed this with him in detail and he has Aleve OTC at home that he can take (one tab) every 12 hours with food for the next 2 days then call his primary care physician on Monday/ next clinic day.  Continue the hydrocodone. Gabapentin may be helpful but this would not be appropriate for [facility] initiation so not offered.   Reviewed worrisome signs and symptoms with him and advised an emergency room visit if worsening. He understands and agrees with this plan. Maral Tristan PA-C on 3/21/2020 at 6:26 PM       Patient Instructions     Patient Education      Understanding Lumbar Radiculopathy    Lumbar radiculopathy is irritation or inflammation of a nerve root in the low back. It causes symptoms that spread out from the back down one or both legs. To understand this condition, it helps to understand the parts of the spine:    Vertebrae. These are bones that stack to form the spine. The lumbar spine contains the 5 bottom vertebrae.    Disks. These are soft pads of tissue between the vertebrae. They act as shock absorbers for the spine.    Spinal canal. This is a tunnel formed within the stacked vertebrae. In the lumbar spine, nerves run through this canal.    Nerves. These branch off and leave the spinal canal, traveling out to parts of the body. As they leave the spinal canal, nerves pass through openings between the vertebrae. The nerve root is the part of the nerve that is closest to the spinal canal.    Sciatic nerve. This is a large nerve formed from several nerve roots in the low back. This nerve extends down the back of the leg to the foot.  With lumbar radiculopathy, nerve roots in the low back become irritated. This leads to pain and symptoms. The sciatic nerve is commonly involved, so the condition is often called sciatica.  What causes lumbar radiculopathy?  Aging, injury, poor posture, extra body weight, and other issues can lead to problems in the low back. These problems may then irritate nerve roots. They include:    Damage to a disk in the lumbar spine. The damaged disk may then press on nearby nerve roots.    Degeneration from wear and tear, and aging. This can lead to narrowing (stenosis) of the openings between the vertebrae. The narrowed openings press on nerve roots as they leave the spinal canal.    Unstable spine. This is when a vertebra slips forward. It can then press on a nerve root.  Other, less common things can put pressure on nerves in the low back. These include diabetes, infection, or a tumor.  Symptoms of lumbar radiculopathy  These  include:    Pain in the low back    Pain, numbness, tingling, or weakness that travels into the buttocks, hip, groin, or leg    Muscle spasms  Treatment for lumbar radiculopathy  In most cases, your healthcare provider will first try treatments that help relieve symptoms. These may include:    Prescription and over-the-counter pain medicines. These help relieve pain, swelling, and irritation.    Limits on positions and activities that increase pain. But lying in bed or avoiding all movement is only recommended for a short period of time.    Physical therapy, including exercises and stretches. This helps decrease pain and increase movement and function.    Steroid shots into the lower back. This may help relieve symptoms for a time.    Weight-loss program. If you are overweight, losing extra pounds may help relieve symptoms.  In some cases, you may need surgery to fix the underlying problem. This depends on the cause, the symptoms, and how long the pain has lasted.  Possible complications  Over time, an irritated and inflamed nerve may become damaged. This may lead to long-lasting (permanent) numbness or weakness in your legs and feet. If symptoms change suddenly or get worse, be sure to let your healthcare provider know.  When to call your healthcare provider  Call your healthcare provider right away if you have any of these:    New pain or pain that gets worse    New or increasing weakness, tingling, or numbness in your leg or foot    Problems controlling your bladder or bowel   Date Last Reviewed: 3/10/2016    8053-1232 [employer]. [address] [place] [address], [place] [address]. All rights reserved. This information is not intended as a substitute for professional medical care. Always follow your healthcare professional's instructions.    Take Aleve twice a day (every 12 hours) WITH FOOD for today and tomorrow. Call Dr. Abdi's office on Monday.  Your kidneys are not completely normal so it is not safe  to take Aleve all the time, but it is fine for short term when your pain is this bad. You can continue the hydrocodone for pain. The muscle relaxer, methocarbamol, will make you very tired. You may want to use this only at bedtime but can take it up to three times daily.  If you have worsening pain or have trouble controlling your bowel or bladder, go the emergency room.

## 2020-03-21 NOTE — NURSING NOTE
"Chief Complaint   Patient presents with     Musculoskeletal Problem     right leg     Patient is here for pain in the right leg that started yesterday. Patient states the whole leg goes numb. Patient states heat does not help and he has relief from pain when laying flat.     Initial /74   Pulse 65   Temp 97.9  F (36.6  C) (Tympanic)   Resp 16   Ht 1.816 m (5' 11.5\")   Wt 114.3 kg (252 lb)   SpO2 93%   BMI 34.66 kg/m   Estimated body mass index is 34.66 kg/m  as calculated from the following:    Height as of this encounter: 1.816 m (5' 11.5\").    Weight as of this encounter: 114.3 kg (252 lb).  Medication Reconciliation: complete    Graciela Callaway LPN  "

## 2020-03-22 ENCOUNTER — HOSPITAL ENCOUNTER (EMERGENCY)
Facility: OTHER | Age: 75
Discharge: HOME | End: 2020-03-22
Attending: PHYSICIAN ASSISTANT | Admitting: PHYSICIAN ASSISTANT
Payer: COMMERCIAL

## 2020-03-22 VITALS
HEIGHT: 72 IN | DIASTOLIC BLOOD PRESSURE: 89 MMHG | WEIGHT: 250 LBS | RESPIRATION RATE: 16 BRPM | BODY MASS INDEX: 33.86 KG/M2 | TEMPERATURE: 97.8 F | OXYGEN SATURATION: 96 % | SYSTOLIC BLOOD PRESSURE: 170 MMHG | HEART RATE: 68 BPM

## 2020-03-22 DIAGNOSIS — M53.87 SCIATICA OF RIGHT SIDE ASSOCIATED WITH DISORDER OF LUMBOSACRAL SPINE: ICD-10-CM

## 2020-03-22 DIAGNOSIS — M47.816 DEGENERATIVE JOINT DISEASE OF CERVICAL AND LUMBAR SPINE: ICD-10-CM

## 2020-03-22 DIAGNOSIS — M47.812 DEGENERATIVE JOINT DISEASE OF CERVICAL AND LUMBAR SPINE: ICD-10-CM

## 2020-03-22 PROCEDURE — 25000132 ZZH RX MED GY IP 250 OP 250 PS 637: Performed by: PHYSICIAN ASSISTANT

## 2020-03-22 PROCEDURE — 99284 EMERGENCY DEPT VISIT MOD MDM: CPT | Performed by: PHYSICIAN ASSISTANT

## 2020-03-22 PROCEDURE — 99283 EMERGENCY DEPT VISIT LOW MDM: CPT | Mod: Z6 | Performed by: PHYSICIAN ASSISTANT

## 2020-03-22 PROCEDURE — 96372 THER/PROPH/DIAG INJ SC/IM: CPT | Performed by: PHYSICIAN ASSISTANT

## 2020-03-22 PROCEDURE — 25000128 H RX IP 250 OP 636: Performed by: PHYSICIAN ASSISTANT

## 2020-03-22 RX ORDER — IBUPROFEN 800 MG/1
800 TABLET, FILM COATED ORAL EVERY 8 HOURS PRN
Qty: 60 TABLET | Refills: 0 | Status: SHIPPED | OUTPATIENT
Start: 2020-03-22 | End: 2020-09-28

## 2020-03-22 RX ORDER — KETOROLAC TROMETHAMINE 30 MG/ML
30 INJECTION, SOLUTION INTRAMUSCULAR; INTRAVENOUS ONCE
Status: COMPLETED | OUTPATIENT
Start: 2020-03-22 | End: 2020-03-22

## 2020-03-22 RX ORDER — CYCLOBENZAPRINE HCL 10 MG
10 TABLET ORAL ONCE
Status: COMPLETED | OUTPATIENT
Start: 2020-03-22 | End: 2020-03-22

## 2020-03-22 RX ORDER — LIDOCAINE 50 MG/G
2 PATCH TOPICAL ONCE
Status: DISCONTINUED | OUTPATIENT
Start: 2020-03-22 | End: 2020-03-22 | Stop reason: HOSPADM

## 2020-03-22 RX ORDER — FENTANYL CITRATE 50 UG/ML
100 INJECTION, SOLUTION INTRAMUSCULAR; INTRAVENOUS ONCE
Status: COMPLETED | OUTPATIENT
Start: 2020-03-22 | End: 2020-03-22

## 2020-03-22 RX ADMIN — KETOROLAC TROMETHAMINE 30 MG: 30 INJECTION, SOLUTION INTRAMUSCULAR at 16:02

## 2020-03-22 RX ADMIN — FENTANYL CITRATE 100 MCG: 50 INJECTION, SOLUTION INTRAMUSCULAR; INTRAVENOUS at 16:02

## 2020-03-22 RX ADMIN — CYCLOBENZAPRINE HYDROCHLORIDE 10 MG: 10 TABLET, FILM COATED ORAL at 16:02

## 2020-03-22 RX ADMIN — LIDOCAINE 2 PATCH: 50 PATCH TOPICAL at 16:15

## 2020-03-22 ASSESSMENT — ENCOUNTER SYMPTOMS
VOMITING: 0
DYSURIA: 0
SEIZURES: 0
EYE PAIN: 0
CHILLS: 0
NAUSEA: 0
APPETITE CHANGE: 0
DIZZINESS: 0
FATIGUE: 0
CHEST TIGHTNESS: 0
VOICE CHANGE: 0
FEVER: 0
FACIAL SWELLING: 0
BACK PAIN: 1
HEADACHES: 0
ADENOPATHY: 0
NECK PAIN: 0
COUGH: 0
ABDOMINAL PAIN: 0
SINUS PRESSURE: 0
DIARRHEA: 0
WEAKNESS: 0
HEMATURIA: 0
BRUISES/BLEEDS EASILY: 0
CONFUSION: 0
SHORTNESS OF BREATH: 0
FREQUENCY: 0
WOUND: 0
LIGHT-HEADEDNESS: 0
TROUBLE SWALLOWING: 0
SORE THROAT: 0
ACTIVITY CHANGE: 0

## 2020-03-22 ASSESSMENT — MIFFLIN-ST. JEOR: SCORE: 1901.99

## 2020-03-22 NOTE — ED PROVIDER NOTES
History     Chief Complaint   Patient presents with     Leg Pain     HPI  Bryan Kearney is a 74 year old male who this is a 74-year-old male who has had a longstanding history of low back pain with radiation to his Buttocks and numbness to his right leg with sciatica.  He was actually seen yesterday in the clinic and started on Robaxin.  Prior to this he has been on hydrocodone as well as Flexeril.  He reports this does not seem to be working.  Denies any new injury.    Allergies:  No Known Allergies    Problem List:    Patient Active Problem List    Diagnosis Date Noted     Benign prostatic hyperplasia with urinary frequency 03/02/2020     Priority: Medium     Left wrist pain 08/05/2019     Priority: Medium     Pain of finger of left hand 08/05/2019     Priority: Medium     Hip pain, left 02/11/2019     Priority: Medium     Irritant contact dermatitis due to other chemical products 02/11/2019     Priority: Medium     Acute cystitis 10/10/2018     Priority: Medium     Esophageal reflux 09/05/2018     Priority: Medium     Degenerative joint disease of cervical and lumbar spine 09/05/2018     Priority: Medium     Actinic keratosis 11/29/2015     Priority: Medium     Solar keratosis 05/29/2015     Priority: Medium     Colonoscopy refused 07/29/2013     Priority: Medium     Overview:   Discussed and refused 07/2013       Hypercholesterolemia 05/11/2011     Priority: Medium     Gout 04/14/2010     Priority: Medium     Benign neoplasm of colon 09/18/2009     Priority: Medium     Overview:   benign       Hearing loss 09/18/2009     Priority: Medium     HTN (hypertension) 12/24/2007     Priority: Medium     Overview:   IMO Update 10/11          Past Medical History:    Past Medical History:   Diagnosis Date     Electrocution      Essential (primary) hypertension      Gastro-esophageal reflux disease without esophagitis        Past Surgical History:    Past Surgical History:   Procedure Laterality Date     APPENDECTOMY  OPEN      No Comments Provided     ARTHROSCOPY SHOULDER ROTATOR CUFF REPAIR      No Comments Provided     COLONOSCOPY      06/04/2001,Next colonoscopy due in 2011.     OTHER SURGICAL HISTORY      ,CAROTID ENDARDECTOMY,Left carotid endarterectomy secondary to high grade stenosis     OTHER SURGICAL HISTORY      ,CAROTID ENDARDECTOMY,Bilateral endarterectomy       Family History:    Family History   Problem Relation Age of Onset     Cancer Father         Cancer,Lymphoma     Colon Cancer Brother         Cancer-colon       Social History:  Marital Status:   [2]  Social History     Tobacco Use     Smoking status: Former Smoker     Packs/day: 0.75     Years: 11.00     Pack years: 8.25     Smokeless tobacco: Never Used   Substance Use Topics     Alcohol use: Yes     Alcohol/week: 1.0 standard drinks     Comment: 1 a week      Drug use: No        Medications:    HYDROcodone-acetaminophen (NORCO) 7.5-325 MG per tablet  ibuprofen (ADVIL/MOTRIN) 800 MG tablet  amLODIPine (NORVASC) 5 MG tablet  aspirin 325 MG tablet  atenolol (TENORMIN) 50 MG tablet  fluorouracil (EFUDEX) 5 % external cream  hydrochlorothiazide (HYDRODIURIL) 25 MG tablet  methocarbamol (ROBAXIN) 500 MG tablet  simvastatin (ZOCOR) 20 MG tablet  tamsulosin (FLOMAX) 0.4 MG capsule          Review of Systems   Constitutional: Negative for activity change, appetite change, chills, fatigue and fever.   HENT: Negative for congestion, facial swelling, sinus pressure, sore throat, trouble swallowing and voice change.    Eyes: Negative for pain and visual disturbance.   Respiratory: Negative for cough, chest tightness and shortness of breath.    Cardiovascular: Negative for chest pain.   Gastrointestinal: Negative for abdominal pain, diarrhea, nausea and vomiting.   Genitourinary: Negative for dysuria, frequency, hematuria and urgency.   Musculoskeletal: Positive for back pain. Negative for neck pain.   Skin: Negative for rash and wound.   Neurological:  Negative for dizziness, seizures, syncope, weakness, light-headedness and headaches.   Hematological: Negative for adenopathy. Does not bruise/bleed easily.   Psychiatric/Behavioral: Negative for confusion.       Physical Exam   BP: (!) 182/113  Pulse: 68  Temp: 97.8  F (36.6  C)  Resp: 16  Height: 182.9 cm (6')  Weight: 113.4 kg (250 lb)  SpO2: 96 %      Physical Exam  Constitutional:       General: He is not in acute distress.     Appearance: He is not ill-appearing, toxic-appearing or diaphoretic.   HENT:      Head: Atraumatic.      Right Ear: Tympanic membrane normal. No drainage or tenderness.      Left Ear: Tympanic membrane normal. No drainage or tenderness.      Nose: Nose normal. No rhinorrhea.   Eyes:      General: No scleral icterus.     Extraocular Movements: Extraocular movements intact.      Right eye: Normal extraocular motion and no nystagmus.      Left eye: Normal extraocular motion and no nystagmus.      Pupils: Pupils are equal, round, and reactive to light. Pupils are equal.      Funduscopic exam:     Right eye: No AV nicking or papilledema. Red reflex present.         Left eye: No AV nicking or papilledema. Red reflex present.  Neck:      Musculoskeletal: Normal range of motion. No neck rigidity, pain with movement or muscular tenderness.      Vascular: No JVD.      Trachea: No tracheal deviation.   Cardiovascular:      Rate and Rhythm: Normal rate and regular rhythm.      Heart sounds: Normal heart sounds. No friction rub.   Pulmonary:      Effort: No respiratory distress.      Breath sounds: Normal breath sounds. No stridor.   Abdominal:      General: Bowel sounds are normal.      Palpations: Abdomen is soft.      Tenderness: There is no abdominal tenderness. There is no guarding or rebound.   Musculoskeletal: Normal range of motion.         General: No tenderness.      Comments: Patient with some slight tenderness to palpation L4-S1.  This does radiate over his right SI joint area.  Otherwise  he has full active and passive range of motion CMS x4.   Lymphadenopathy:      Cervical: No cervical adenopathy.      Right cervical: No superficial cervical adenopathy.     Left cervical: No superficial cervical adenopathy.   Skin:     General: Skin is warm.      Capillary Refill: Capillary refill takes less than 2 seconds.      Findings: No rash.   Neurological:      General: No focal deficit present.      Mental Status: He is alert and oriented to person, place, and time.         ED Course     No results found for this or any previous visit (from the past 24 hour(s)).    Medications   lidocaine (LIDODERM) 5 % Patch 2 patch (2 patches Transdermal Patch/Med Applied 3/22/20 1615)   lidocaine patch in PLACE ( Transdermal Patch in Place 3/22/20 1617)   fentaNYL (PF) (SUBLIMAZE) injection 100 mcg (100 mcg Intramuscular Given 3/22/20 1602)   cyclobenzaprine (FLEXERIL) tablet 10 mg (10 mg Oral Given 3/22/20 1602)   ketorolac (TORADOL) injection 30 mg (30 mg Intramuscular Given 3/22/20 1602)       Assessments & Plan (with Medical Decision Making)     I have reviewed the nursing notes.    I have reviewed the findings, diagnosis, plan and need for follow up with the patient.      New Prescriptions    IBUPROFEN (ADVIL/MOTRIN) 800 MG TABLET    Take 1 tablet (800 mg) by mouth every 8 hours as needed for moderate pain       Final diagnoses:   Degenerative joint disease of cervical and lumbar spine   Sciatica of right side associated with disorder of lumbosacral spine     Afebrile.  Vital signs stable.  Patient with chronic lumbar back pain and chronic right sided sciatica.  Currently on hydrocodone as well as Robaxin and muscle relaxers.  I discussed with the patient given the chronic nature of his lumbar back pain my concerns for him repeatedly coming to the clinic in hospital during the coronavirus and the risk of exposure.  I discussed he will need to follow-up with his primary care provider for further management.  He was  given an IM injection of fentanyl, Toradol, and Flexeril.  He was also had Lidoderm patches applied to the affected sites.  I discussed with the patient that they slowly is over-the-counter as well.  He is aware of ice and heat therapy and he already has a physical therapy referral.  Follow-up if there is any concerns for further evaluation as needed.    3/22/2020   [facility]     Fredy Gallagher PA-C  03/22/20 [id number]

## 2020-03-22 NOTE — ED AVS SNAPSHOT
[facility]  [address]  [place] [address]  Phone:  [phone]  Fax:  [phone]                                    Bryan Kearney   MRN: [id number]    Department:  [facility]   Date of Visit:  3/22/2020           After Visit Summary Signature Page    I have received my discharge instructions, and my questions have been answered. I have discussed any challenges I see with this plan with the nurse or doctor.    ..........................................................................................................................................  Patient/Patient Representative Signature      ..........................................................................................................................................  Patient Representative Print Name and Relationship to Patient    ..................................................               ................................................  Date                                   Time    ..........................................................................................................................................  Reviewed by Signature/Title    ...................................................              ..............................................  Date                                               Time          22EPIC Rev 08/18

## 2020-03-22 NOTE — ED TRIAGE NOTES
Pt here by himself with c/o rt sided back and rt leg pain and groin, pt reports that he was here yesterday for the same thing and pain pills aren't working, pt denies any injuries, /113, pt brought back into Er to be evaluated

## 2020-03-23 ENCOUNTER — TELEPHONE (OUTPATIENT)
Dept: FAMILY MEDICINE | Facility: OTHER | Age: 75
End: 2020-03-23

## 2020-03-23 DIAGNOSIS — M54.41 ACUTE BILATERAL LOW BACK PAIN WITH RIGHT-SIDED SCIATICA: Primary | ICD-10-CM

## 2020-03-23 NOTE — TELEPHONE ENCOUNTER
Patient was to the [facility] Saturday and the ER on Sunday he is having sciatic pain. He is almost out of the muscle relaxers and would like a refill or something stronger. He is good lying down then gets up, takes 2 steps and cant walk anymore. Please advise, his daughter will not be able to  until Wed at [facility] pharmacy. Patient aware that a return call will be in the morning. Brigette Garnica LPN .......................3/23/2020  4:40 PM

## 2020-03-23 NOTE — TELEPHONE ENCOUNTER
Please call patient regarding muscle relaxer Rx. Spouse states it is not working and thinks he may need an alternative. Thanks.

## 2020-03-24 RX ORDER — CARISOPRODOL 350 MG/1
350 TABLET ORAL 4 TIMES DAILY PRN
Qty: 40 TABLET | Refills: 1 | Status: SHIPPED | OUTPATIENT
Start: 2020-03-24 | End: 2020-12-11

## 2020-03-24 NOTE — TELEPHONE ENCOUNTER
Patient notified that Rx was sent to pharmacy.  Adelaide Pérez LPN, LPN  3/24/2020  2:52 PM

## 2020-03-24 NOTE — TELEPHONE ENCOUNTER
Returning a call from Adelaide.  Call on cell if not at home number.  Thanks, Stephania Chambers on 3/24/2020 at 2:49 PM

## 2020-03-24 NOTE — TELEPHONE ENCOUNTER
Spouse will have him call us back when he wakes up.  Adelaide Pérez LPN, LPN  3/24/2020  1:35 PM

## 2020-04-01 ENCOUNTER — NURSE TRIAGE (OUTPATIENT)
Dept: FAMILY MEDICINE | Facility: OTHER | Age: 75
End: 2020-04-01

## 2020-04-01 ENCOUNTER — OFFICE VISIT (OUTPATIENT)
Dept: FAMILY MEDICINE | Facility: OTHER | Age: 75
End: 2020-04-01
Attending: NURSE PRACTITIONER
Payer: COMMERCIAL

## 2020-04-01 VITALS
TEMPERATURE: 97 F | DIASTOLIC BLOOD PRESSURE: 72 MMHG | WEIGHT: 250 LBS | HEART RATE: 57 BPM | HEIGHT: 72 IN | RESPIRATION RATE: 18 BRPM | SYSTOLIC BLOOD PRESSURE: 130 MMHG | OXYGEN SATURATION: 94 % | BODY MASS INDEX: 33.86 KG/M2

## 2020-04-01 DIAGNOSIS — M54.41 ACUTE BILATERAL LOW BACK PAIN WITH RIGHT-SIDED SCIATICA: Primary | ICD-10-CM

## 2020-04-01 PROCEDURE — 99213 OFFICE O/P EST LOW 20 MIN: CPT | Performed by: NURSE PRACTITIONER

## 2020-04-01 PROCEDURE — G0463 HOSPITAL OUTPT CLINIC VISIT: HCPCS

## 2020-04-01 RX ORDER — PREDNISONE 20 MG/1
TABLET ORAL
Qty: 17 TABLET | Refills: 0 | Status: SHIPPED | OUTPATIENT
Start: 2020-04-01 | End: 2020-04-13

## 2020-04-01 ASSESSMENT — MIFFLIN-ST. JEOR: SCORE: 1894.05

## 2020-04-01 ASSESSMENT — ENCOUNTER SYMPTOMS: BACK PAIN: 1

## 2020-04-01 ASSESSMENT — PAIN SCALES - GENERAL: PAINLEVEL: EXTREME PAIN (9)

## 2020-04-01 NOTE — TELEPHONE ENCOUNTER
Called and informed patients wife of Dr. Barnard's response and wife verbalized understanding and will find a ride in.    Erica Keith RN on 4/1/2020 at 9:45 AM

## 2020-04-01 NOTE — TELEPHONE ENCOUNTER
States that pt's sciatica is acting up.  His foot is swollen and his BP is high.  This morning it was 183/100 before his medication   home

## 2020-04-01 NOTE — TELEPHONE ENCOUNTER
"S-(situation): patient gave verbal okay to speak with wife.  Wife states that patients BP this morning 183/100, right foot swollen, and painful and sciatica acting up on that side also.    B-(background): wife states Tuesday AM BP: 160/89 Monday /100 takes hydrochlorothiazide and atenolol.  States last night /66  History of gout in same foot just big toe right now it is whole foot swollen.    A-(assessment): patients BP on recheck just now 159/87, took BP meds at 6am.  Denies any headache, chest pain, SOB, weakness.  Right whole foot swollen and painful, can hardly walk on it.  States has foot wrapped, ice pack and elevated.    R-(recommendations): Due to process with COVID will route message to covering provider for review and consideration if patient should come in or possibly telephone visit.    Erica Keith RN on 4/1/2020 at 8:13 AM    Additional Information    Systolic BP >= 180 OR Diastolic >= 110, and missed most recent dose of blood pressure medication    Negative: BP Systolic BP >= 140 OR Diastolic >= 90 and postpartum < 4 weeks    Negative: Systolic BP >= 160 OR Diastolic >= 100, and any cardiac or neurologic symptoms (e.g., chest pain, difficulty breathing, unsteady gait, blurred vision)    Negative: Patient sounds very sick or weak to the triager    Negative: Sounds like a life-threatening emergency to the triager    Negative: Pregnant > 20 weeks and new hand or face swelling    Negative: Pregnant > 20 weeks and BP > 140/90    Answer Assessment - Initial Assessment Questions  1. BLOOD PRESSURE: \"What is the blood pressure?\" \"Did you take at least two measurements 5 minutes apart?\"      183/100  2. ONSET: \"When did you take your blood pressure?\"      This morning  3. HOW: \"How did you obtain the blood pressure?\" (e.g., visiting nurse, automatic home BP monitor)      Auto machine  4. HISTORY: \"Do you have a history of high blood pressure?\"      yes  5. MEDICATIONS: \"Are you taking any " "medications for blood pressure?\" \"Have you missed any doses recently?\"      Yes hydrochlorothiazide, atenolol  6. OTHER SYMPTOMS: \"Do you have any symptoms?\" (e.g., headache, chest pain, blurred vision, difficulty breathing, weakness)      Right foot swollen , feels warm, little, painful  7. PREGNANCY: \"Is there any chance you are pregnant?\" \"When was your last menstrual period?\"      n/a    Protocols used: HIGH BLOOD PRESSURE-A-OH    "

## 2020-04-01 NOTE — PROGRESS NOTES
"  SUBJECTIVE:   Bryan Kearney is a 74 year old male who presents to clinic today for the following health issues:    HPI  Patient presents for evaluation of acute right sided back pain with radiculopathy. Notes injury occurred when augering while ice fishing.  He has been seen for this problem four times since the start of his pain on 3/2/2020. Imaging of XR lumbar and cervical showed multifocal degenerative changes. He had an increase in Hydrocodone to 7.5 twice a day by his PCP. He reports pain medicine brings some relief, or at least helps him sleep. He is also taking Robaxin twice a day and 800 mg of ibuprofen twice a day. Today he actually feels better than previously. No new symptoms. No bladder or bowel incontinence. He is anxious to get back to his work. \"I have a lot of stuff to do and need to get better.\"     Patient Active Problem List    Diagnosis Date Noted     Benign prostatic hyperplasia with urinary frequency 03/02/2020     Priority: Medium     Left wrist pain 08/05/2019     Priority: Medium     Pain of finger of left hand 08/05/2019     Priority: Medium     Hip pain, left 02/11/2019     Priority: Medium     Irritant contact dermatitis due to other chemical products 02/11/2019     Priority: Medium     Acute cystitis 10/10/2018     Priority: Medium     Esophageal reflux 09/05/2018     Priority: Medium     Degenerative joint disease of cervical and lumbar spine 09/05/2018     Priority: Medium     Actinic keratosis 11/29/2015     Priority: Medium     Solar keratosis 05/29/2015     Priority: Medium     Colonoscopy refused 07/29/2013     Priority: Medium     Overview:   Discussed and refused 07/2013       Hypercholesterolemia 05/11/2011     Priority: Medium     Gout 04/14/2010     Priority: Medium     Benign neoplasm of colon 09/18/2009     Priority: Medium     Overview:   benign       Hearing loss 09/18/2009     Priority: Medium     HTN (hypertension) 12/24/2007     Priority: Medium     Overview:   IMO " "Update 10/11       Past Medical History:   Diagnosis Date     Electrocution     H/o electrocution 13,800 volts     Essential (primary) hypertension     Hypertension     Gastro-esophageal reflux disease without esophagitis     G E R D      Past Surgical History:   Procedure Laterality Date     APPENDECTOMY OPEN      No Comments Provided     ARTHROSCOPY SHOULDER ROTATOR CUFF REPAIR      No Comments Provided     COLONOSCOPY      06/04/2001,Next colonoscopy due in 2011.     OTHER SURGICAL HISTORY      ,CAROTID ENDARDECTOMY,Left carotid endarterectomy secondary to high grade stenosis     OTHER SURGICAL HISTORY      ,CAROTID ENDARDECTOMY,Bilateral endarterectomy       Review of Systems   Musculoskeletal: Positive for back pain.        OBJECTIVE:     /72 (BP Location: Right arm, Patient Position: Sitting, Cuff Size: Adult Large)   Pulse 57   Temp 97  F (36.1  C) (Tympanic)   Resp 18   Ht 1.816 m (5' 11.5\")   Wt 113.4 kg (250 lb)   SpO2 94%   BMI 34.38 kg/m    Body mass index is 34.38 kg/m .  Physical Exam  Constitutional:       Appearance: Normal appearance.   Musculoskeletal:      Lumbar back: He exhibits pain. He exhibits no bony tenderness.   Neurological:      Mental Status: He is alert.     Ataxic gait. Straight leg raise positive on R side. Reflexes normal left side and decreased on right leg. Sensation intact. Pain most aggravated with palpation at SI joint R side.   R leg slight edema. No redness or calf swelling.      Diagnostic Test Results:  none     ASSESSMENT/PLAN:   1. Acute bilateral low back pain with right-sided sciatica  I'm hopeful that his symptoms will start to improve. Due to radiculopathy symptoms we will do a taper of steroids over the next 2 weeks as this may bring him some relief. I suggested he start decreasing his narcotic use. Advised to stop his morning dose and take only prior to bed. He can continue with NSAID use as needed. Plan to take medications with food to avoid " GI upset. He has orders for PT, though this has been halted due to COVID virus. Advised to start increasing activity as tolerated. Follow-up as needed.   - predniSONE (DELTASONE) 20 MG tablet; Take 2 tablets (40 mg) by mouth daily for 5 days, THEN 1 tablet (20 mg) daily for 5 days, THEN 0.5 tablets (10 mg) daily for 4 days.  Dispense: 17 tablet; Refill: 0    Lore Buck [facility]

## 2020-04-01 NOTE — NURSING NOTE
Patient presents to clinic experiencing right side pain radiating down leg and right ankle edema for past month.    Medication Reconciliation: complete    Frieda Esposito LPN

## 2020-04-01 NOTE — PATIENT INSTRUCTIONS
"Try to use the pain medication \"Norco\" only at bedtime. Continue to use the ibuprofen 800 mg twice a day as directed by Dr. Abdi.     Continue to be active. Walk in the house.     Start steroid today. Take with food and in the morning.       "

## 2020-04-10 DIAGNOSIS — M54.50 LUMBAR BACK PAIN: ICD-10-CM

## 2020-04-10 DIAGNOSIS — M54.2 NECK PAIN: ICD-10-CM

## 2020-04-10 RX ORDER — HYDROCODONE BITARTRATE AND ACETAMINOPHEN 7.5; 325 MG/1; MG/1
1 TABLET ORAL EVERY 4 HOURS PRN
Qty: 30 TABLET | Refills: 0 | OUTPATIENT
Start: 2020-04-10

## 2020-04-10 NOTE — TELEPHONE ENCOUNTER
hydrocodone 7.5 mg-acetaminophen 325 mg tablet   Last Written Prescription Date:  3/17/20  Last Fill Quantity: 30,   # refills: 0  Last Office Visit: 4/1/2020  Future Office visit:       Routing refill request to provider for review/approval because:  Drug not on the [facility] or [facility] refill protocol or controlled substance.  Will forward to provider for consideration.  Unable to complete prescription refill per RNMedication Refill Policy.................... Nayely Simental RN ....................  4/10/2020   11:33 AM

## 2020-04-13 ENCOUNTER — VIRTUAL VISIT (OUTPATIENT)
Dept: FAMILY MEDICINE | Facility: OTHER | Age: 75
End: 2020-04-13
Attending: FAMILY MEDICINE
Payer: COMMERCIAL

## 2020-04-13 VITALS
BODY MASS INDEX: 33.86 KG/M2 | TEMPERATURE: 96 F | WEIGHT: 250 LBS | DIASTOLIC BLOOD PRESSURE: 72 MMHG | SYSTOLIC BLOOD PRESSURE: 134 MMHG | HEIGHT: 72 IN

## 2020-04-13 DIAGNOSIS — M54.50 LUMBAR BACK PAIN: ICD-10-CM

## 2020-04-13 DIAGNOSIS — E78.00 HYPERCHOLESTEROLEMIA: ICD-10-CM

## 2020-04-13 DIAGNOSIS — M54.2 NECK PAIN: ICD-10-CM

## 2020-04-13 DIAGNOSIS — I10 ESSENTIAL HYPERTENSION: ICD-10-CM

## 2020-04-13 PROCEDURE — 99213 OFFICE O/P EST LOW 20 MIN: CPT | Mod: 95 | Performed by: FAMILY MEDICINE

## 2020-04-13 RX ORDER — ATORVASTATIN CALCIUM 20 MG/1
20 TABLET, FILM COATED ORAL DAILY
Qty: 90 TABLET | Refills: 3 | Status: SHIPPED | OUTPATIENT
Start: 2020-04-13 | End: 2021-05-03

## 2020-04-13 RX ORDER — HYDROCODONE BITARTRATE AND ACETAMINOPHEN 7.5; 325 MG/1; MG/1
1 TABLET ORAL EVERY 4 HOURS PRN
Qty: 30 TABLET | Refills: 0 | Status: SHIPPED | OUTPATIENT
Start: 2020-04-13 | End: 2020-09-28

## 2020-04-13 RX ORDER — SIMVASTATIN 20 MG
TABLET ORAL
Qty: 90 TABLET | Refills: 3 | Status: CANCELLED | OUTPATIENT
Start: 2020-04-13

## 2020-04-13 RX ORDER — AMLODIPINE BESYLATE 5 MG/1
TABLET ORAL
Qty: 90 TABLET | Refills: 3 | Status: SHIPPED | OUTPATIENT
Start: 2020-04-13 | End: 2021-05-03

## 2020-04-13 ASSESSMENT — PAIN SCALES - GENERAL: PAINLEVEL: MODERATE PAIN (5)

## 2020-04-13 ASSESSMENT — MIFFLIN-ST. JEOR: SCORE: 1894.05

## 2020-04-13 NOTE — PROGRESS NOTES
"Bryan Kearney is a 74 year old male who is being evaluated via a billable telephone visit.      The patient has been notified of following:     \"This telephone visit will be conducted via a call between you and your physician/provider. We have found that certain health care needs can be provided without the need for a physical exam.  This service lets us provide the care you need with a short phone conversation.  If a prescription is necessary we can send it directly to your pharmacy.  If lab work is needed we can place an order for that and you can then stop by our lab to have the test done at a later time.    Telephone visits are billed at different rates depending on your insurance coverage. During this emergency period, for some insurers they may be billed the same as an in-person visit.  Please reach out to your insurance provider with any questions.    If during the course of the call the physician/provider feels a telephone visit is not appropriate, you will not be charged for this service.\"    Patient has given verbal consent for Telephone visit?  Yes    How would you like to obtain your AVS?   Declines at this time    Subjective     Bryan Kearney is a 74 year old male who presents to clinic today for the following health issues:    Medication refill  Patient is phone call with medication refill.  He was recently seen in clinic on a number of occasions for back pain and leg pain.  He reports he is been improving.  He was given a steroid Dosepak.  And seeing a chiropractor.  Reports he is almost back to baseline.  He is requesting a refill of his pain medication which he is to use a little bit more than usual.  But now down to 1 or 2 a day as needed but occasionally will skip 3 to 4 days.  Patient is also requesting a refill of his blood pressure medication and cholesterol.               Reviewed and updated as needed this visit by Provider         Review of Systems          Objective   Reported vitals:  " There were no vitals taken for this visit.     PSYCH: Alert and oriented times 3; coherent speech, normal   rate and volume, able to articulate logical thoughts, able   to abstract reason, no tangential thoughts, no hallucinations   or delusions  His affect is   RESP: No cough, no audible wheezing, able to talk in full sentences  Remainder of exam unable to be completed due to telephone visits            Assessment/Plan:  1. Lumbar back pain  Refill  - HYDROcodone-acetaminophen (NORCO) 7.5-325 MG per tablet; Take 1 tablet by mouth every 4 hours as needed for severe pain  Dispense: 30 tablet; Refill: 0    2. Neck pain  - HYDROcodone-acetaminophen (NORCO) 7.5-325 MG per tablet; Take 1 tablet by mouth every 4 hours as needed for severe pain  Dispense: 30 tablet; Refill: 0    3. Hypercholesterolemia  Patient was on simvastatin with reaction to amlodipine.  Will change to Lipitor.  - atorvastatin (LIPITOR) 20 MG tablet; Take 1 tablet (20 mg) by mouth daily  Dispense: 90 tablet; Refill: 3    4. Essential hypertension  Refill  - amLODIPine (NORVASC) 5 MG tablet; TAKE ONE TABLET ORALLY EVERY DAY  Dispense: 90 tablet; Refill: 3    No follow-ups on file.      Phone call duration:  18 minutes    Abundio Abdi MD

## 2020-04-13 NOTE — NURSING NOTE
Spoke to patient regarding phone visit for medication refills.  Johanny Rock LPN 4/13/2020   3:01 PM    Chief Complaint   Patient presents with     Recheck Medication     Medication Reconciliation: complete  Johanny Rock LPN

## 2020-04-13 NOTE — TELEPHONE ENCOUNTER
Patient has phone visit  appt with PCP today. Brigette Garnica LPN .......................4/13/2020  3:23 PM

## 2020-04-14 ENCOUNTER — TELEPHONE (OUTPATIENT)
Dept: FAMILY MEDICINE | Facility: OTHER | Age: 75
End: 2020-04-14

## 2020-04-14 NOTE — TELEPHONE ENCOUNTER
Per  call patient and notify him that his simvastatin was changed to Lipitor and his pain medication was refilled. These prescriptions were sent to [facility]. Brigette Garnica LPN .......................4/14/2020  9:22 AM

## 2020-09-28 ENCOUNTER — OFFICE VISIT (OUTPATIENT)
Dept: FAMILY MEDICINE | Facility: OTHER | Age: 75
End: 2020-09-28
Attending: FAMILY MEDICINE
Payer: COMMERCIAL

## 2020-09-28 VITALS
RESPIRATION RATE: 20 BRPM | TEMPERATURE: 96.9 F | DIASTOLIC BLOOD PRESSURE: 72 MMHG | WEIGHT: 255.4 LBS | OXYGEN SATURATION: 97 % | BODY MASS INDEX: 35.12 KG/M2 | SYSTOLIC BLOOD PRESSURE: 128 MMHG | HEART RATE: 58 BPM

## 2020-09-28 DIAGNOSIS — L57.0 ACTINIC KERATOSIS: ICD-10-CM

## 2020-09-28 DIAGNOSIS — M54.50 LUMBAR BACK PAIN: ICD-10-CM

## 2020-09-28 DIAGNOSIS — C44.300 MALIGNANT NEOPLASM OF SKIN OF FACE: Primary | ICD-10-CM

## 2020-09-28 DIAGNOSIS — M54.2 NECK PAIN: ICD-10-CM

## 2020-09-28 DIAGNOSIS — R31.29 OTHER MICROSCOPIC HEMATURIA: ICD-10-CM

## 2020-09-28 LAB
ALBUMIN UR-MCNC: 10 MG/DL
APPEARANCE UR: ABNORMAL
BILIRUB UR QL STRIP: NEGATIVE
COLOR UR AUTO: YELLOW
GLUCOSE UR STRIP-MCNC: NEGATIVE MG/DL
HGB UR QL STRIP: ABNORMAL
KETONES UR STRIP-MCNC: NEGATIVE MG/DL
LEUKOCYTE ESTERASE UR QL STRIP: ABNORMAL
MUCOUS THREADS #/AREA URNS LPF: PRESENT /LPF
NITRATE UR QL: NEGATIVE
PH UR STRIP: 6 PH (ref 5–7)
RBC #/AREA URNS AUTO: 17 /HPF (ref 0–2)
SOURCE: ABNORMAL
SP GR UR STRIP: 1.02 (ref 1–1.03)
UROBILINOGEN UR STRIP-MCNC: NORMAL MG/DL (ref 0–2)
WBC #/AREA URNS AUTO: >182 /HPF (ref 0–5)

## 2020-09-28 PROCEDURE — G0463 HOSPITAL OUTPT CLINIC VISIT: HCPCS

## 2020-09-28 PROCEDURE — 81001 URINALYSIS AUTO W/SCOPE: CPT | Mod: ZL | Performed by: FAMILY MEDICINE

## 2020-09-28 PROCEDURE — 99214 OFFICE O/P EST MOD 30 MIN: CPT | Performed by: FAMILY MEDICINE

## 2020-09-28 PROCEDURE — 87086 URINE CULTURE/COLONY COUNT: CPT | Mod: ZL | Performed by: FAMILY MEDICINE

## 2020-09-28 RX ORDER — SULFAMETHOXAZOLE/TRIMETHOPRIM 800-160 MG
1 TABLET ORAL 2 TIMES DAILY
Qty: 14 TABLET | Refills: 0 | Status: SHIPPED | OUTPATIENT
Start: 2020-09-28 | End: 2020-09-28

## 2020-09-28 RX ORDER — HYDROCODONE BITARTRATE AND ACETAMINOPHEN 7.5; 325 MG/1; MG/1
1 TABLET ORAL EVERY 4 HOURS PRN
Qty: 60 TABLET | Refills: 0 | Status: SHIPPED | OUTPATIENT
Start: 2020-09-28 | End: 2020-09-28

## 2020-09-28 RX ORDER — HYDROCODONE BITARTRATE AND ACETAMINOPHEN 7.5; 325 MG/1; MG/1
1 TABLET ORAL EVERY 4 HOURS PRN
Qty: 60 TABLET | Refills: 0 | Status: SHIPPED | OUTPATIENT
Start: 2020-09-28 | End: 2021-01-21

## 2020-09-28 RX ORDER — FLUOROURACIL 50 MG/G
CREAM TOPICAL 2 TIMES DAILY
Qty: 40 G | Refills: 1 | Status: SHIPPED | OUTPATIENT
Start: 2020-09-28 | End: 2022-11-03

## 2020-09-28 RX ORDER — SULFAMETHOXAZOLE/TRIMETHOPRIM 800-160 MG
1 TABLET ORAL 2 TIMES DAILY
Qty: 14 TABLET | Refills: 0 | Status: SHIPPED | OUTPATIENT
Start: 2020-09-28 | End: 2020-11-26

## 2020-09-28 ASSESSMENT — PAIN SCALES - GENERAL: PAINLEVEL: NO PAIN (0)

## 2020-09-28 NOTE — LETTER
September 29, 2020      Bryan Kearney  [address] [place] [address]        Dear ,    We are writing to inform you of your test results.  Enclosed is a copy of your culture which was mixed kailyn.  Again I would recommend completing the course and following up in clinic for a repeat UA.  If it continues to be abnormal I would recommend a urology consult.    Resulted Orders   UA reflex to Microscopic and Culture   Result Value Ref Range    Color Urine Yellow     Appearance Urine Slightly Cloudy     Glucose Urine Negative NEG^Negative mg/dL    Bilirubin Urine Negative NEG^Negative    Ketones Urine Negative NEG^Negative mg/dL    Specific Gravity Urine 1.022 1.003 - 1.035    Blood Urine Small (A) NEG^Negative    pH Urine 6.0 5.0 - 7.0 pH    Protein Albumin Urine 10 (A) NEG^Negative mg/dL    Urobilinogen mg/dL Normal 0.0 - 2.0 mg/dL    Nitrite Urine Negative NEG^Negative    Leukocyte Esterase Urine Large (A) NEG^Negative    Source Midstream Urine     RBC Urine 17 (H) 0 - 2 /HPF    WBC Urine >182 (H) 0 - 5 /HPF    Mucous Urine Present (A) NEG^Negative /LPF   Urine Culture Aerobic Bacterial   Result Value Ref Range    Specimen Description Midstream Urine     Culture Micro       10,000 to 50,000 colonies/mL  mixed urogenital kailyn  No further identification or sensitivity done         If you have any questions or concerns, please call the clinic at the number listed above.       Sincerely,        Abundio Abdi MD

## 2020-09-28 NOTE — PROGRESS NOTES
As seen by the p.m.   SUBJECTIVE:   Bryan Kearney is a 75 year old male who presents to clinic today for the following health issues: Request hydrocodone for back pain.  And blood in the urine.  Chin lesion    Patient arrives here for hydrocodone refill.  He uses fairly sparingly.  He uses the hydrocodone for back pain.  Occasionally will use a muscle relaxant that also helps.  As seen by the [facility] his last prescription was April.  Total Private Pay: 0    Fill Date ID   Written Drug Qty Days Prescriber Rx # Pharmacy Refill   Daily Dose* Pymt Type [facility]    09/28/2020  1   09/28/2020  Hydrocodone-Acetamin 7.5-325  60.00  10 Mi Lie   [id number]   Gra (1789)   0  45.00 MME  [place]  04/15/2020  1   04/13/2020  Hydrocodone-Acetamin 7.5-325  30.00  5 Mi Lie   [id number]   Ess (9021)   0  45.00 [place]  04/10/2020  1   03/24/2020  Carisoprodol 350 MG Tablet  40.00  10 Mi Lie   [id number]   Gra (1789)   1  1.12 [place]  03/24/2020  1   03/24/2020  Carisoprodol 350 MG Tablet  40.00  10 Mi Lie   [id number]   Gra (1789)   0  1.12 [place]  03/17/2020  1   03/17/2020  Hydrocodone-Acetamin 7.5-325  30.00  5 Mi Lie   [id number]   Gra (1789)   0  45.00 [place]  03/02/2020  1   03/02/2020  Hydrocodone-Acetamin 5-325 MG  40.00  10 Mi Lie   [id number]   Gra (1789)   0  20.00 [place]  10/18/2019  1   10/18/2019  Hydrocodone-Acetamin 5-325 MG  28.00  7 Mi Lie   [id number]   Gra (1789)   0  20.00 [place]ME  [place]  *Per CDC guidance, the MME conversion factors prescribed or provided as part of the medication-assisted treatment for opioid use disorder should not be used to benchmark against dosage  Patient also has a lesion on his chin he would like looked at.  Is been there for years.  Slowly growing.  And finally he has been noticing some blood in his urine.  No fevers or chills.  No dysuria.  No other complaints.        Patient Active Problem List    Diagnosis Date Noted     Benign prostatic  hyperplasia with urinary frequency 03/02/2020     Priority: Medium     Left wrist pain 08/05/2019     Priority: Medium     Pain of finger of left hand 08/05/2019     Priority: Medium     Hip pain, left 02/11/2019     Priority: Medium     Irritant contact dermatitis due to other chemical products 02/11/2019     Priority: Medium     Acute cystitis 10/10/2018     Priority: Medium     Esophageal reflux 09/05/2018     Priority: Medium     Degenerative joint disease of cervical and lumbar spine 09/05/2018     Priority: Medium     Actinic keratosis 11/29/2015     Priority: Medium     Solar keratosis 05/29/2015     Priority: Medium     Colonoscopy refused 07/29/2013     Priority: Medium     Overview:   Discussed and refused 07/2013       Hypercholesterolemia 05/11/2011     Priority: Medium     Gout 04/14/2010     Priority: Medium     Benign neoplasm of colon 09/18/2009     Priority: Medium     Overview:   benign       Hearing loss 09/18/2009     Priority: Medium     HTN (hypertension) 12/24/2007     Priority: Medium     Overview:   IMO Update 10/11       Past Medical History:   Diagnosis Date     Electrocution     H/o electrocution 13,800 volts     Essential (primary) hypertension     Hypertension     Gastro-esophageal reflux disease without esophagitis     G E R D      Current Outpatient Medications   Medication Sig Dispense Refill     amLODIPine (NORVASC) 5 MG tablet TAKE ONE TABLET ORALLY EVERY DAY 90 tablet 3     aspirin 325 MG tablet Take 325 mg by mouth daily       atenolol (TENORMIN) 50 MG tablet TAKE ONE TABLET ORALLY ONCE A DAY 90 tablet 3     atorvastatin (LIPITOR) 20 MG tablet Take 1 tablet (20 mg) by mouth daily 90 tablet 3     fluorouracil (EFUDEX) 5 % external cream Apply topically 2 times daily 40 g 1     hydrochlorothiazide (HYDRODIURIL) 25 MG tablet TAKE ONE TABLET ORALLY ONCE A DAY 90 tablet 3     HYDROcodone-acetaminophen (NORCO) 7.5-325 MG per tablet Take 1 tablet by mouth every 4 hours as needed for  severe pain 60 tablet 0     methocarbamol (ROBAXIN) 500 MG tablet Take 1 tablet (500 mg) by mouth 3 times daily 12 tablet 0     sulfamethoxazole-trimethoprim (BACTRIM DS) 800-160 MG tablet Take 1 tablet by mouth 2 times daily 14 tablet 0     carisoprodol (SOMA) 350 MG tablet Take 1 tablet (350 mg) by mouth 4 times daily as needed for muscle spasms (Patient not taking: Reported on 9/28/2020) 40 tablet 1     tamsulosin (FLOMAX) 0.4 MG capsule Take 1 capsule (0.4 mg) by mouth daily (Patient not taking: Reported on 9/28/2020) 90 capsule 3     No Known Allergies    Review of Systems   Constitutional: Negative for chills and fever.   Eyes: Negative.    Respiratory: Negative.    Cardiovascular: Negative for chest pain.   Genitourinary: Negative.    Musculoskeletal: Positive for back pain.   Skin: Positive for wound.   Neurological: Negative for dizziness.   Psychiatric/Behavioral: Negative.         OBJECTIVE:     /72   Pulse 58   Temp 96.9  F (36.1  C)   Resp 20   Wt 115.8 kg (255 lb 6.4 oz)   SpO2 97%   BMI 35.12 kg/m    Body mass index is 35.12 kg/m .  Physical Exam  Constitutional:       Appearance: Normal appearance.   HENT:      Head: Normocephalic.      Comments: Patient has a beard present.  He has a large nodule underneath his chin.  He also has a ulcerated lesion.  Peripherally is pearly edges.  Lesion measures 4-1/2 x 4 cm in his beard.  Cardiovascular:      Rate and Rhythm: Normal rate and regular rhythm.   Abdominal:      General: Abdomen is flat. There is no distension.      Tenderness: There is no abdominal tenderness.   Skin:     General: Skin is warm.      Comments: Solar dermatitis on the dorsum of his hands.   Neurological:      Mental Status: He is alert.   Psychiatric:         Mood and Affect: Mood normal.         Diagnostic Test Results:  Results for orders placed or performed in visit on 09/28/20   UA reflex to Microscopic and Culture     Status: Abnormal    Specimen: Midstream Urine    Result Value Ref Range    Color Urine Yellow     Appearance Urine Slightly Cloudy     Glucose Urine Negative NEG^Negative mg/dL    Bilirubin Urine Negative NEG^Negative    Ketones Urine Negative NEG^Negative mg/dL    Specific Gravity Urine 1.022 1.003 - 1.035    Blood Urine Small (A) NEG^Negative    pH Urine 6.0 5.0 - 7.0 pH    Protein Albumin Urine 10 (A) NEG^Negative mg/dL    Urobilinogen mg/dL Normal 0.0 - 2.0 mg/dL    Nitrite Urine Negative NEG^Negative    Leukocyte Esterase Urine Large (A) NEG^Negative    Source Midstream Urine     RBC Urine 17 (H) 0 - 2 /HPF    WBC Urine >182 (H) 0 - 5 /HPF    Mucous Urine Present (A) NEG^Negative /LPF         ASSESSMENT/PLAN:         1. Hematuria  We will culture the urine.  Start the patient on Bactrim empirically.  I have also asked the patient to follow-up for repeat UA.  If he continues to have RBCs or WBCs then patient is in need of a urology referral  - UA reflex to Microscopic and Culture  - Urine Culture Aerobic Bacterial    2. 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  Referral to plastic surgery  - PLASTIC SURGERY REFERRAL    3. Lumbar back pain  Refill.  - HYDROcodone-acetaminophen (NORCO) 7.5-325 MG per tablet; Take 1 tablet by mouth every 4 hours as needed for severe pain  Dispense: 60 tablet; Refill: 0    4. Neck pain  Refill  - HYDROcodone-acetaminophen (NORCO) 7.5-325 MG per tablet; Take 1 tablet by mouth every 4 hours as needed for severe pain  Dispense: 60 tablet; Refill: 0    5. Other microscopic hematuria    - sulfamethoxazole-trimethoprim (BACTRIM DS) 800-160 MG tablet; Take 1 tablet by mouth 2 times daily  Dispense: 14 tablet; Refill: 0    6. Actinic keratosis    - fluorouracil (EFUDEX) 5 % external cream; Apply topically 2 times daily  Dispense: 40 g; Refill: 1      Abundio Abdi MD  [facility]

## 2020-09-28 NOTE — NURSING NOTE
Patient here for hematuria and refill on pain medicaiton also his cancer spot are coming back one under his chin and on top of his hands. Medication Reconciliation: complete.    Brigette Garnica LPN  9/28/2020 11:09 AM

## 2020-09-29 LAB
BACTERIA SPEC CULT: NORMAL
SPECIMEN SOURCE: NORMAL

## 2020-09-29 ASSESSMENT — ENCOUNTER SYMPTOMS
BACK PAIN: 1
WOUND: 1
FEVER: 0
DIZZINESS: 0
RESPIRATORY NEGATIVE: 1
PSYCHIATRIC NEGATIVE: 1
EYES NEGATIVE: 1
CHILLS: 0

## 2020-10-05 ENCOUNTER — TRANSFERRED RECORDS (OUTPATIENT)
Dept: HEALTH INFORMATION MANAGEMENT | Facility: OTHER | Age: 75
End: 2020-10-05

## 2020-10-08 ENCOUNTER — TRANSFERRED RECORDS (OUTPATIENT)
Dept: HEALTH INFORMATION MANAGEMENT | Facility: OTHER | Age: 75
End: 2020-10-08

## 2020-10-08 DIAGNOSIS — I10 ESSENTIAL HYPERTENSION: Primary | ICD-10-CM

## 2020-10-08 RX ORDER — HYDROCHLOROTHIAZIDE 25 MG/1
TABLET ORAL
Qty: 90 TABLET | Refills: 0 | Status: SHIPPED | OUTPATIENT
Start: 2020-10-08 | End: 2021-01-12

## 2020-10-08 NOTE — TELEPHONE ENCOUNTER
[facility] sent Rx request for the following:   hydrochlorothiazide (HYDRODIURIL) 25 MG tablet  Sig:  TAKE ONE TABLET ORALLY ONCE A DAY    Last Prescription Date:   10/10/2019  Last Fill Qty/Refills:         90, R-3    Last Office Visit:              9/28/2020   Future Office visit:           None  Routing refill request to provider for review/approval because:  Diuretics (Including Combos) Protocol Failed  Normal serum creatinine on file in past 12 months   Normal serum potassium on file in past 12 months   Normal serum sodium on file in past 12 months     Eliane Poole RN  ....................  10/8/2020   3:21 PM

## 2020-10-26 DIAGNOSIS — I10 ESSENTIAL HYPERTENSION: ICD-10-CM

## 2020-10-27 RX ORDER — ATENOLOL 50 MG/1
TABLET ORAL
Qty: 90 TABLET | Refills: 2 | Status: SHIPPED | OUTPATIENT
Start: 2020-10-27 | End: 2021-07-27

## 2020-10-27 NOTE — TELEPHONE ENCOUNTER
"Requested Prescriptions   Pending Prescriptions Disp Refills     atenolol (TENORMIN) 50 MG tablet [Pharmacy Med Name: Atenolol 50 MG Oral Tablet (Tenormin)] 90 tablet 3     Sig: Take 1 Tab by mouth one time a day.       Beta-Blockers Protocol Passed - 10/26/2020  4:11 PM        Passed - Blood pressure under 140/90 in past 12 months     BP Readings from Last 3 Encounters:   09/28/20 128/72   04/13/20 134/72   04/01/20 130/72           Passed - Patient is age 6 or older        Passed - Recent (12 mo) or future (30 days) visit within the authorizing provider's specialty     Patient has had an office visit with the authorizing provider or a provider within the authorizing providers department within the previous 12 mos or has a future within next 30 days. See \"Patient Info\" tab in inbasket, or \"Choose Columns\" in Meds & Orders section of the refill encounter.              Passed - Medication is active on med list         LOV-9/28/20 Prescription refilled per RN Medication RefillPolicy.................... Nayely Simental RN ....................  10/27/2020   9:27 AM        "

## 2020-11-05 ENCOUNTER — OFFICE VISIT (OUTPATIENT)
Dept: FAMILY MEDICINE | Facility: OTHER | Age: 75
End: 2020-11-05
Attending: FAMILY MEDICINE
Payer: COMMERCIAL

## 2020-11-05 VITALS
DIASTOLIC BLOOD PRESSURE: 78 MMHG | TEMPERATURE: 97.6 F | SYSTOLIC BLOOD PRESSURE: 132 MMHG | HEART RATE: 60 BPM | RESPIRATION RATE: 16 BRPM | OXYGEN SATURATION: 98 % | BODY MASS INDEX: 36.06 KG/M2 | WEIGHT: 262.2 LBS

## 2020-11-05 DIAGNOSIS — E66.01 MORBID OBESITY (H): ICD-10-CM

## 2020-11-05 DIAGNOSIS — C44.300 MALIGNANT NEOPLASM OF SKIN OF FACE: ICD-10-CM

## 2020-11-05 DIAGNOSIS — Z02.89 HEALTH EXAMINATION OF DEFINED SUBPOPULATION: Primary | ICD-10-CM

## 2020-11-05 DIAGNOSIS — I65.22 STENOSIS OF LEFT CAROTID ARTERY: ICD-10-CM

## 2020-11-05 DIAGNOSIS — R01.1 SYSTOLIC MURMUR: ICD-10-CM

## 2020-11-05 PROBLEM — L24.5 IRRITANT CONTACT DERMATITIS DUE TO OTHER CHEMICAL PRODUCTS: Status: RESOLVED | Noted: 2019-02-11 | Resolved: 2020-11-05

## 2020-11-05 PROBLEM — M25.532 LEFT WRIST PAIN: Status: RESOLVED | Noted: 2019-08-05 | Resolved: 2020-11-05

## 2020-11-05 PROBLEM — M79.645 PAIN OF FINGER OF LEFT HAND: Status: RESOLVED | Noted: 2019-08-05 | Resolved: 2020-11-05

## 2020-11-05 LAB
BASOPHILS # BLD AUTO: 0.1 10E9/L (ref 0–0.2)
BASOPHILS NFR BLD AUTO: 0.5 %
DIFFERENTIAL METHOD BLD: NORMAL
EOSINOPHIL # BLD AUTO: 0.3 10E9/L (ref 0–0.7)
EOSINOPHIL NFR BLD AUTO: 3.7 %
ERYTHROCYTE [DISTWIDTH] IN BLOOD BY AUTOMATED COUNT: 12.8 % (ref 10–15)
HCT VFR BLD AUTO: 43.7 % (ref 40–53)
HGB BLD-MCNC: 15.1 G/DL (ref 13.3–17.7)
IMM GRANULOCYTES # BLD: 0 10E9/L (ref 0–0.4)
IMM GRANULOCYTES NFR BLD: 0.3 %
LYMPHOCYTES # BLD AUTO: 2.5 10E9/L (ref 0.8–5.3)
LYMPHOCYTES NFR BLD AUTO: 26.5 %
MCH RBC QN AUTO: 30.9 PG (ref 26.5–33)
MCHC RBC AUTO-ENTMCNC: 34.6 G/DL (ref 31.5–36.5)
MCV RBC AUTO: 90 FL (ref 78–100)
MONOCYTES # BLD AUTO: 0.9 10E9/L (ref 0–1.3)
MONOCYTES NFR BLD AUTO: 9.5 %
NEUTROPHILS # BLD AUTO: 5.5 10E9/L (ref 1.6–8.3)
NEUTROPHILS NFR BLD AUTO: 59.5 %
PLATELET # BLD AUTO: 259 10E9/L (ref 150–450)
RBC # BLD AUTO: 4.88 10E12/L (ref 4.4–5.9)
WBC # BLD AUTO: 9.2 10E9/L (ref 4–11)

## 2020-11-05 PROCEDURE — 36415 COLL VENOUS BLD VENIPUNCTURE: CPT | Mod: ZL | Performed by: FAMILY MEDICINE

## 2020-11-05 PROCEDURE — G0463 HOSPITAL OUTPT CLINIC VISIT: HCPCS

## 2020-11-05 PROCEDURE — 85025 COMPLETE CBC W/AUTO DIFF WBC: CPT | Mod: ZL | Performed by: FAMILY MEDICINE

## 2020-11-05 PROCEDURE — 99214 OFFICE O/P EST MOD 30 MIN: CPT | Performed by: FAMILY MEDICINE

## 2020-11-05 ASSESSMENT — PATIENT HEALTH QUESTIONNAIRE - PHQ9
5. POOR APPETITE OR OVEREATING: NOT AT ALL
SUM OF ALL RESPONSES TO PHQ QUESTIONS 1-9: 0

## 2020-11-05 ASSESSMENT — PAIN SCALES - GENERAL: PAINLEVEL: MODERATE PAIN (5)

## 2020-11-05 ASSESSMENT — ANXIETY QUESTIONNAIRES
3. WORRYING TOO MUCH ABOUT DIFFERENT THINGS: NOT AT ALL
2. NOT BEING ABLE TO STOP OR CONTROL WORRYING: NOT AT ALL
5. BEING SO RESTLESS THAT IT IS HARD TO SIT STILL: NOT AT ALL
6. BECOMING EASILY ANNOYED OR IRRITABLE: NOT AT ALL
GAD7 TOTAL SCORE: 0
7. FEELING AFRAID AS IF SOMETHING AWFUL MIGHT HAPPEN: NOT AT ALL
1. FEELING NERVOUS, ANXIOUS, OR ON EDGE: NOT AT ALL

## 2020-11-05 NOTE — PROGRESS NOTES
[facility]  [address]  [place] [address]  Phone: [phone]  Fax: [phone]  Primary Provider: Zoe Abdi  Pre-op Performing Provider: ZOE ABDI    PREOPERATIVE EVALUATION:  Today's date: 11/5/2020    Bryan Kearney is a 75 year old male who presents for a preoperative evaluation.    Surgical Information:  Surgery/Procedure:11/23/2020  Surgery Location: [facility]   Surgeon:   Surgery Date: 11/09/2020, 11/23/2020  Time of Surgery:   Where patient plans to recover: At home with family  Fax number for surgical facility: [facility]    Type of Anesthesia Anticipated: to be determined    Subjective     HPI related to upcoming procedure: Patient arrives here for preop.      Preop Questions 11/5/2020   1. Have you ever had a heart attack or stroke? No   2. Have you ever had surgery on your heart or blood vessels, such as a stent placement, a coronary artery bypass, or surgery on an artery in your head, neck, heart, or legs? YES - carotid surg   3. Do you have chest pain with activity? No   4. Do you have a history of  heart failure? No   5. Do you currently have a cold, bronchitis or symptoms of other infection? No   6. Do you have a cough, shortness of breath, or wheezing? No   7. Do you or anyone in your family have previous history of blood clots? No   8. Do you or does anyone in your family have a serious bleeding problem such as prolonged bleeding following surgeries or cuts? No   9. Have you ever had problems with anemia or been told to take iron pills? No   10. Have you had any abnormal blood loss such as black, tarry or bloody stools? No   11. Have you ever had a blood transfusion? YES -    11a. Have you ever had a transfusion reaction? No   12. Are you willing to have a blood transfusion if it is medically needed before, during, or after your surgery? Yes   13. Have you or any of your relatives ever had problems with anesthesia?  No   14. Do you have sleep apnea, excessive snoring or daytime drowsiness? YES -    14a. Do you have a CPAP machine? No   15. Do you have any artifical heart valves or other implanted medical devices like a pacemaker, defibrillator, or continuous glucose monitor? No   16. Do you have artificial joints? No   17. Are you allergic to latex? No     Health Care Directive:  Patient does not have a Health Care Directive or Living Will: Discussed advance care planning with patient; however, patient declined at this time.    Preoperative Review of :   reviewed - controlled substances reflected in medication list.  Patient is scheduled to have a skin cancer removed from his chin November 23.  Surgery will be done at [facility].  Patient also reports he has an appointment in [place] with Dr. Palacios for a follow-up vascular problems.  He is not quite sure what it is.  It sounds like after review of the chart and talking to the patient's carotid stenosis.  That will be done next week.  States he gets yearly examinations.          Review of Systems  CONSTITUTIONAL: NEGATIVE for fever, chills, change in weight  ENT/MOUTH: NEGATIVE for ear, mouth and throat problems  RESP: NEGATIVE for significant cough or SOB  CV: NEGATIVE for chest pain, palpitations or peripheral edema    Patient Active Problem List    Diagnosis Date Noted     Benign prostatic hyperplasia with urinary frequency 03/02/2020     Priority: Medium     Left wrist pain 08/05/2019     Priority: Medium     Pain of finger of left hand 08/05/2019     Priority: Medium     Hip pain, left 02/11/2019     Priority: Medium     Irritant contact dermatitis due to other chemical products 02/11/2019     Priority: Medium     Acute cystitis 10/10/2018     Priority: Medium     Esophageal reflux 09/05/2018     Priority: Medium     Degenerative joint disease of cervical and lumbar spine 09/05/2018     Priority: Medium     Actinic keratosis 11/29/2015     Priority: Medium      Solar keratosis 05/29/2015     Priority: Medium     Colonoscopy refused 07/29/2013     Priority: Medium     Overview:   Discussed and refused 07/2013       Hypercholesterolemia 05/11/2011     Priority: Medium     Gout 04/14/2010     Priority: Medium     Benign neoplasm of colon 09/18/2009     Priority: Medium     Overview:   benign       Hearing loss 09/18/2009     Priority: Medium     HTN (hypertension) 12/24/2007     Priority: Medium     Overview:   IMO Update 10/11        Past Medical History:   Diagnosis Date     Electrocution     H/o electrocution 13,800 volts     Essential (primary) hypertension     Hypertension     Gastro-esophageal reflux disease without esophagitis     G E R D     Past Surgical History:   Procedure Laterality Date     APPENDECTOMY OPEN      No Comments Provided     ARTHROSCOPY SHOULDER ROTATOR CUFF REPAIR      No Comments Provided     COLONOSCOPY      06/04/2001,Next colonoscopy due in 2011.     OTHER SURGICAL HISTORY      ,CAROTID ENDARDECTOMY,Left carotid endarterectomy secondary to high grade stenosis     OTHER SURGICAL HISTORY      ,CAROTID ENDARDECTOMY,Bilateral endarterectomy     Current Outpatient Medications   Medication Sig Dispense Refill     amLODIPine (NORVASC) 5 MG tablet TAKE ONE TABLET ORALLY EVERY DAY 90 tablet 3     aspirin 325 MG tablet Take 325 mg by mouth daily       atenolol (TENORMIN) 50 MG tablet Take 1 Tab by mouth one time a day. 90 tablet 2     atorvastatin (LIPITOR) 20 MG tablet Take 1 tablet (20 mg) by mouth daily 90 tablet 3     carisoprodol (SOMA) 350 MG tablet Take 1 tablet (350 mg) by mouth 4 times daily as needed for muscle spasms 40 tablet 1     fluorouracil (EFUDEX) 5 % external cream Apply topically 2 times daily 40 g 1     hydrochlorothiazide (HYDRODIURIL) 25 MG tablet TAKE ONE TABLET ORALLY ONCE A DAY 90 tablet 0     HYDROcodone-acetaminophen (NORCO) 7.5-325 MG per tablet Take 1 tablet by mouth every 4 hours as needed for severe pain 60  tablet 0     methocarbamol (ROBAXIN) 500 MG tablet Take 1 tablet (500 mg) by mouth 3 times daily 12 tablet 0     sulfamethoxazole-trimethoprim (BACTRIM DS) 800-160 MG tablet Take 1 tablet by mouth 2 times daily 14 tablet 0     tamsulosin (FLOMAX) 0.4 MG capsule Take 1 capsule (0.4 mg) by mouth daily 90 capsule 3       No Known Allergies     Social History     Tobacco Use     Smoking status: Former Smoker     Packs/day: 1.00     Years: 25.00     Pack years: 25.00     Quit date: 2020     Years since quittin.6     Smokeless tobacco: Never Used   Substance Use Topics     Alcohol use: Yes     Alcohol/week: 1.0 standard drinks     Comment: Rare     Family History   Problem Relation Age of Onset     Cancer Father         Cancer,Lymphoma     Colon Cancer Brother         Cancer-colon     History   Drug Use No     Results for orders placed or performed in visit on 20   CBC and Differential     Status: None   Result Value Ref Range    WBC 9.2 4.0 - 11.0 10e9/L    RBC Count 4.88 4.4 - 5.9 10e12/L    Hemoglobin 15.1 13.3 - 17.7 g/dL    Hematocrit 43.7 40.0 - 53.0 %    MCV 90 78 - 100 fl    MCH 30.9 26.5 - 33.0 pg    MCHC 34.6 31.5 - 36.5 g/dL    RDW 12.8 10.0 - 15.0 %    Platelet Count 259 150 - 450 10e9/L    Diff Method Automated Method     % Neutrophils 59.5 %    % Lymphocytes 26.5 %    % Monocytes 9.5 %    % Eosinophils 3.7 %    % Basophils 0.5 %    % Immature Granulocytes 0.3 %    Absolute Neutrophil 5.5 1.6 - 8.3 10e9/L    Absolute Lymphocytes 2.5 0.8 - 5.3 10e9/L    Absolute Monocytes 0.9 0.0 - 1.3 10e9/L    Absolute Eosinophils 0.3 0.0 - 0.7 10e9/L    Absolute Basophils 0.1 0.0 - 0.2 10e9/L    Abs Immature Granulocytes 0.0 0 - 0.4 10e9/L           Objective     /78   Pulse 60   Temp 97.6  F (36.4  C)   Resp 16   Wt 118.9 kg (262 lb 3.2 oz)   SpO2 98%   BMI 36.06 kg/m      Physical Exam  GENERAL APPEARANCE: healthy, alert and no distress  HENT: ear canals and TM's normal and nose and mouth  without ulcers or lesions  RESP: lungs clear to auscultation - no rales, rhonchi or wheezes  CV: regular rate and rhythm, normal S1 S2, no S3 or S4 murmur 1 out of 4 systolic murmur best heard in the right upper sternal border.  ABDOMEN: soft, nontender, no HSM or masses and bowel sounds normal  NEURO: Normal strength and tone, sensory exam grossly normal, mentation intact and speech normal  Skin shows a large mass ulcerated lesion just underneath his chin.  Recent Labs   Lab Test 10/02/19  0715      POTASSIUM 4.2   CR 1.34*        Diagnostics:  No labs were ordered during this visit.   No EKG required for low risk surgery (cataract, skin procedure, breast biopsy, etc).    Revised Cardiac Risk Index (RCRI):  The patient has the following serious cardiovascular risks for perioperative complications:   - Cerebrovascular Disease (TIA or CVA) = 1 point     RCRI Interpretation: 1 point: Class II (low risk - 0.9% complication rate)           Assessment & Plan   The proposed surgical procedure is considered LOW risk.    Problem List Items Addressed This Visit        Circulatory    Stenosis of left carotid artery      Other Visit Diagnoses     Health examination of defined subpopulation    -  Primary    Relevant Orders    CBC and Differential    Malignant neoplasm of skin of face                   Risks and Recommendations:  The patient has the following additional risks and recommendations for perioperative complications:   - No identified additional risk factors other than previously addressed    Advised patient to discuss upcoming procedure with his vascular surgeon and also for clearance of.  If there is any questions he will contact me.  Vascular surgery note reviewed patient has noncritical carotid stenosis.  He is medically cleared to proceed with surgery      RECOMMENDATION:  APPROVAL GIVEN to proceed with proposed procedure, without further diagnostic evaluation.    Signed Electronically by: Abundio Abdi,  MD    Copy of this evaluation report is provided to requesting physician.    Preop [facility] Preop Guidelines    Revised Cardiac Risk Index

## 2020-11-05 NOTE — LETTER
November 9, 2020      Bryan Kearney  [address] [place]  [place] [address]        Dear ,    We are writing to inform you of your test results.    Your test results fall within the expected range(s) or remain unchanged from previous results.  Please continue with current treatment plan.    Resulted Orders   CBC and Differential   Result Value Ref Range    WBC 9.2 4.0 - 11.0 10e9/L    RBC Count 4.88 4.4 - 5.9 10e12/L    Hemoglobin 15.1 13.3 - 17.7 g/dL    Hematocrit 43.7 40.0 - 53.0 %    MCV 90 78 - 100 fl    MCH 30.9 26.5 - 33.0 pg    MCHC 34.6 31.5 - 36.5 g/dL    RDW 12.8 10.0 - 15.0 %    Platelet Count 259 150 - 450 10e9/L    Diff Method Automated Method     % Neutrophils 59.5 %    % Lymphocytes 26.5 %    % Monocytes 9.5 %    % Eosinophils 3.7 %    % Basophils 0.5 %    % Immature Granulocytes 0.3 %    Absolute Neutrophil 5.5 1.6 - 8.3 10e9/L    Absolute Lymphocytes 2.5 0.8 - 5.3 10e9/L    Absolute Monocytes 0.9 0.0 - 1.3 10e9/L    Absolute Eosinophils 0.3 0.0 - 0.7 10e9/L    Absolute Basophils 0.1 0.0 - 0.2 10e9/L    Abs Immature Granulocytes 0.0 0 - 0.4 10e9/L       If you have any questions or concerns, please call the clinic at the number listed above.       Sincerely,        Abundio Abdi MD

## 2020-11-05 NOTE — NURSING NOTE
Patient here for preop for 11/09/2020 Cat scan  On artieries in neck Dr.Engerton Gardiner and 11/23/2020 Excision of leisons at [facility]. Medication Reconciliation: complete.    Brigette Garnica LPN  11/5/2020 10:40 AM

## 2020-11-06 ASSESSMENT — ANXIETY QUESTIONNAIRES: GAD7 TOTAL SCORE: 0

## 2020-11-09 ENCOUNTER — TRANSFERRED RECORDS (OUTPATIENT)
Dept: HEALTH INFORMATION MANAGEMENT | Facility: OTHER | Age: 75
End: 2020-11-09

## 2020-11-09 PROBLEM — E66.01 MORBID OBESITY (H): Status: RESOLVED | Noted: 2020-11-05 | Resolved: 2020-11-09

## 2020-11-09 PROBLEM — E66.01 MORBID OBESITY (H): Status: ACTIVE | Noted: 2020-11-09

## 2020-11-18 ENCOUNTER — ALLIED HEALTH/NURSE VISIT (OUTPATIENT)
Dept: FAMILY MEDICINE | Facility: OTHER | Age: 75
End: 2020-11-18
Attending: FAMILY MEDICINE
Payer: COMMERCIAL

## 2020-11-18 DIAGNOSIS — Z20.822 ENCOUNTER FOR LABORATORY TESTING FOR COVID-19 VIRUS: Primary | ICD-10-CM

## 2020-11-18 PROCEDURE — U0003 INFECTIOUS AGENT DETECTION BY NUCLEIC ACID (DNA OR RNA); SEVERE ACUTE RESPIRATORY SYNDROME CORONAVIRUS 2 (SARS-COV-2) (CORONAVIRUS DISEASE [COVID-19]), AMPLIFIED PROBE TECHNIQUE, MAKING USE OF HIGH THROUGHPUT TECHNOLOGIES AS DESCRIBED BY CMS-2020-01-R: HCPCS | Mod: ZL | Performed by: FAMILY MEDICINE

## 2020-11-18 PROCEDURE — C9803 HOPD COVID-19 SPEC COLLECT: HCPCS

## 2020-11-18 PROCEDURE — 99207 PR NO CHARGE NURSE ONLY: CPT

## 2020-11-18 NOTE — PROGRESS NOTES
Patient is here for [facility] testing for surgery.  Ashley Zamora LPN on 11/18/2020 at 10:07 AM

## 2020-11-19 LAB
LABORATORY COMMENT REPORT: NORMAL
SARS-COV-2 RNA SPEC QL NAA+PROBE: NEGATIVE
SARS-COV-2 RNA SPEC QL NAA+PROBE: NORMAL
SPECIMEN SOURCE: NORMAL
SPECIMEN SOURCE: NORMAL

## 2020-11-25 ENCOUNTER — TELEPHONE (OUTPATIENT)
Dept: FAMILY MEDICINE | Facility: OTHER | Age: 75
End: 2020-11-25

## 2020-11-25 NOTE — TELEPHONE ENCOUNTER
Patient's daughter, Suellen called questioning if pt should have an echo done? She satted she thought one was ordered but it was never done. She said his surgery did not go well and she is wondering if he is supposed to have one before his now upcoming surgery next week? She is looking for clarification.   Please advise.     Mark Goodwin LPN on 11/25/2020 at 10:23 AM

## 2020-11-25 NOTE — TELEPHONE ENCOUNTER
Radiology advises me he was called multiple times and has not returned the call to schedule the echocardiogram.   they will be attempting to call him again.

## 2020-11-25 NOTE — TELEPHONE ENCOUNTER
Called and gave Suellen this information. Pt is supposed to have surgery on Wednesday but he cannot have this done until he has a cardiology consult... Suellen was given the radiology department scheduling number. She was also informed that the echocardiogram was still pending with insurance.     Mark Goodwin LPN on 11/25/2020 at 2:11 PM

## 2020-11-26 ENCOUNTER — APPOINTMENT (OUTPATIENT)
Dept: ULTRASOUND IMAGING | Facility: OTHER | Age: 75
End: 2020-11-26
Attending: STUDENT IN AN ORGANIZED HEALTH CARE EDUCATION/TRAINING PROGRAM
Payer: COMMERCIAL

## 2020-11-26 ENCOUNTER — HOSPITAL ENCOUNTER (EMERGENCY)
Facility: OTHER | Age: 75
Discharge: ANOTHER HOSPITAL | End: 2020-11-26
Attending: STUDENT IN AN ORGANIZED HEALTH CARE EDUCATION/TRAINING PROGRAM | Admitting: STUDENT IN AN ORGANIZED HEALTH CARE EDUCATION/TRAINING PROGRAM
Payer: COMMERCIAL

## 2020-11-26 ENCOUNTER — APPOINTMENT (OUTPATIENT)
Dept: CT IMAGING | Facility: OTHER | Age: 75
End: 2020-11-26
Attending: FAMILY MEDICINE
Payer: COMMERCIAL

## 2020-11-26 ENCOUNTER — NURSE TRIAGE (OUTPATIENT)
Dept: NURSING | Facility: CLINIC | Age: 75
End: 2020-11-26

## 2020-11-26 VITALS
HEIGHT: 72 IN | TEMPERATURE: 100.4 F | OXYGEN SATURATION: 93 % | DIASTOLIC BLOOD PRESSURE: 75 MMHG | SYSTOLIC BLOOD PRESSURE: 153 MMHG | RESPIRATION RATE: 18 BRPM | BODY MASS INDEX: 33.86 KG/M2 | HEART RATE: 74 BPM | WEIGHT: 250 LBS

## 2020-11-26 DIAGNOSIS — M00.9 PYOGENIC ARTHRITIS OF LEFT KNEE JOINT, DUE TO UNSPECIFIED ORGANISM (H): ICD-10-CM

## 2020-11-26 LAB
ANION GAP SERPL CALCULATED.3IONS-SCNC: 8 MMOL/L (ref 3–14)
APPEARANCE FLD: NORMAL
BASOPHILS # BLD AUTO: 0 10E9/L (ref 0–0.2)
BASOPHILS NFR BLD AUTO: 0.3 %
BUN SERPL-MCNC: 26 MG/DL (ref 7–25)
CALCIUM SERPL-MCNC: 9.3 MG/DL (ref 8.6–10.3)
CHLORIDE SERPL-SCNC: 97 MMOL/L (ref 98–107)
CO2 SERPL-SCNC: 30 MMOL/L (ref 21–31)
COLOR FLD: YELLOW
CREAT SERPL-MCNC: 1.4 MG/DL (ref 0.7–1.3)
CRP SERPL-MCNC: 41 MG/L
DIFFERENTIAL METHOD BLD: ABNORMAL
EOSINOPHIL # BLD AUTO: 0.1 10E9/L (ref 0–0.7)
EOSINOPHIL NFR BLD AUTO: 0.9 %
ERYTHROCYTE [DISTWIDTH] IN BLOOD BY AUTOMATED COUNT: 12.5 % (ref 10–15)
GFR SERPL CREATININE-BSD FRML MDRD: 49 ML/MIN/{1.73_M2}
GLUCOSE SERPL-MCNC: 115 MG/DL (ref 70–105)
HCT VFR BLD AUTO: 45.1 % (ref 40–53)
HGB BLD-MCNC: 15 G/DL (ref 13.3–17.7)
IMM GRANULOCYTES # BLD: 0 10E9/L (ref 0–0.4)
IMM GRANULOCYTES NFR BLD: 0.3 %
LABORATORY COMMENT REPORT: NORMAL
LYMPHOCYTES # BLD AUTO: 1.9 10E9/L (ref 0.8–5.3)
LYMPHOCYTES NFR BLD AUTO: 16 %
MCH RBC QN AUTO: 30.1 PG (ref 26.5–33)
MCHC RBC AUTO-ENTMCNC: 33.3 G/DL (ref 31.5–36.5)
MCV RBC AUTO: 90 FL (ref 78–100)
MONOCYTES # BLD AUTO: 1.2 10E9/L (ref 0–1.3)
MONOCYTES NFR BLD AUTO: 10 %
MONOS+MACROS NFR FLD MANUAL: 18 %
NEUTROPHILS # BLD AUTO: 8.6 10E9/L (ref 1.6–8.3)
NEUTROPHILS NFR BLD AUTO: 72.5 %
NEUTS BAND NFR FLD MANUAL: 82 %
PLATELET # BLD AUTO: 241 10E9/L (ref 150–450)
POTASSIUM SERPL-SCNC: 3.7 MMOL/L (ref 3.5–5.1)
RBC # BLD AUTO: 4.99 10E12/L (ref 4.4–5.9)
RBC # FLD: 1000 /UL
SARS-COV-2 RNA SPEC QL NAA+PROBE: NEGATIVE
SARS-COV-2 RNA SPEC QL NAA+PROBE: NORMAL
SODIUM SERPL-SCNC: 135 MMOL/L (ref 134–144)
SPECIMEN SOURCE FLD: NORMAL
SPECIMEN SOURCE: NORMAL
SPECIMEN SOURCE: NORMAL
WBC # BLD AUTO: 11.9 10E9/L (ref 4–11)
WBC # FLD AUTO: NORMAL /UL

## 2020-11-26 PROCEDURE — 20610 DRAIN/INJ JOINT/BURSA W/O US: CPT | Performed by: STUDENT IN AN ORGANIZED HEALTH CARE EDUCATION/TRAINING PROGRAM

## 2020-11-26 PROCEDURE — 80048 BASIC METABOLIC PNL TOTAL CA: CPT | Performed by: STUDENT IN AN ORGANIZED HEALTH CARE EDUCATION/TRAINING PROGRAM

## 2020-11-26 PROCEDURE — 73700 CT LOWER EXTREMITY W/O DYE: CPT | Mod: TC,LT

## 2020-11-26 PROCEDURE — 87070 CULTURE OTHR SPECIMN AEROBIC: CPT | Performed by: STUDENT IN AN ORGANIZED HEALTH CARE EDUCATION/TRAINING PROGRAM

## 2020-11-26 PROCEDURE — 99285 EMERGENCY DEPT VISIT HI MDM: CPT | Mod: 25 | Performed by: STUDENT IN AN ORGANIZED HEALTH CARE EDUCATION/TRAINING PROGRAM

## 2020-11-26 PROCEDURE — 250N000011 HC RX IP 250 OP 636: Performed by: STUDENT IN AN ORGANIZED HEALTH CARE EDUCATION/TRAINING PROGRAM

## 2020-11-26 PROCEDURE — 87040 BLOOD CULTURE FOR BACTERIA: CPT | Mod: XU | Performed by: STUDENT IN AN ORGANIZED HEALTH CARE EDUCATION/TRAINING PROGRAM

## 2020-11-26 PROCEDURE — 89060 EXAM SYNOVIAL FLUID CRYSTALS: CPT | Performed by: STUDENT IN AN ORGANIZED HEALTH CARE EDUCATION/TRAINING PROGRAM

## 2020-11-26 PROCEDURE — 258N000003 HC RX IP 258 OP 636: Performed by: STUDENT IN AN ORGANIZED HEALTH CARE EDUCATION/TRAINING PROGRAM

## 2020-11-26 PROCEDURE — C9803 HOPD COVID-19 SPEC COLLECT: HCPCS | Performed by: STUDENT IN AN ORGANIZED HEALTH CARE EDUCATION/TRAINING PROGRAM

## 2020-11-26 PROCEDURE — 73700 CT LOWER EXTREMITY W/O DYE: CPT | Mod: TC,LT,76

## 2020-11-26 PROCEDURE — 89051 BODY FLUID CELL COUNT: CPT | Performed by: STUDENT IN AN ORGANIZED HEALTH CARE EDUCATION/TRAINING PROGRAM

## 2020-11-26 PROCEDURE — 87635 SARS-COV-2 COVID-19 AMP PRB: CPT | Performed by: FAMILY MEDICINE

## 2020-11-26 PROCEDURE — 96365 THER/PROPH/DIAG IV INF INIT: CPT | Performed by: STUDENT IN AN ORGANIZED HEALTH CARE EDUCATION/TRAINING PROGRAM

## 2020-11-26 PROCEDURE — 36415 COLL VENOUS BLD VENIPUNCTURE: CPT | Performed by: STUDENT IN AN ORGANIZED HEALTH CARE EDUCATION/TRAINING PROGRAM

## 2020-11-26 PROCEDURE — 93971 EXTREMITY STUDY: CPT | Mod: LT

## 2020-11-26 PROCEDURE — 85025 COMPLETE CBC W/AUTO DIFF WBC: CPT | Performed by: STUDENT IN AN ORGANIZED HEALTH CARE EDUCATION/TRAINING PROGRAM

## 2020-11-26 PROCEDURE — 96366 THER/PROPH/DIAG IV INF ADDON: CPT | Performed by: STUDENT IN AN ORGANIZED HEALTH CARE EDUCATION/TRAINING PROGRAM

## 2020-11-26 PROCEDURE — 96375 TX/PRO/DX INJ NEW DRUG ADDON: CPT | Mod: XU | Performed by: STUDENT IN AN ORGANIZED HEALTH CARE EDUCATION/TRAINING PROGRAM

## 2020-11-26 PROCEDURE — 250N000013 HC RX MED GY IP 250 OP 250 PS 637: Performed by: FAMILY MEDICINE

## 2020-11-26 PROCEDURE — 86140 C-REACTIVE PROTEIN: CPT | Performed by: STUDENT IN AN ORGANIZED HEALTH CARE EDUCATION/TRAINING PROGRAM

## 2020-11-26 PROCEDURE — 250N000009 HC RX 250: Performed by: STUDENT IN AN ORGANIZED HEALTH CARE EDUCATION/TRAINING PROGRAM

## 2020-11-26 RX ORDER — SODIUM CHLORIDE 9 MG/ML
INJECTION, SOLUTION INTRAVENOUS CONTINUOUS
Status: DISCONTINUED | OUTPATIENT
Start: 2020-11-26 | End: 2020-11-26

## 2020-11-26 RX ORDER — HYDROMORPHONE HYDROCHLORIDE 1 MG/ML
0.5 INJECTION, SOLUTION INTRAMUSCULAR; INTRAVENOUS; SUBCUTANEOUS ONCE
Status: COMPLETED | OUTPATIENT
Start: 2020-11-26 | End: 2020-11-26

## 2020-11-26 RX ORDER — SODIUM CHLORIDE 9 MG/ML
INJECTION, SOLUTION INTRAVENOUS CONTINUOUS
Status: DISCONTINUED | OUTPATIENT
Start: 2020-11-26 | End: 2020-11-27 | Stop reason: HOSPADM

## 2020-11-26 RX ORDER — ACETAMINOPHEN 500 MG
1000 TABLET ORAL ONCE
Status: COMPLETED | OUTPATIENT
Start: 2020-11-26 | End: 2020-11-26

## 2020-11-26 RX ORDER — LIDOCAINE HYDROCHLORIDE AND EPINEPHRINE 10; 10 MG/ML; UG/ML
5 INJECTION, SOLUTION INFILTRATION; PERINEURAL ONCE
Status: COMPLETED | OUTPATIENT
Start: 2020-11-26 | End: 2020-11-26

## 2020-11-26 RX ADMIN — HYDROMORPHONE HYDROCHLORIDE 0.5 MG: 1 INJECTION, SOLUTION INTRAMUSCULAR; INTRAVENOUS; SUBCUTANEOUS at 19:11

## 2020-11-26 RX ADMIN — LIDOCAINE HYDROCHLORIDE AND EPINEPHRINE 5 ML: 10; 10 INJECTION, SOLUTION INFILTRATION; PERINEURAL at 17:22

## 2020-11-26 RX ADMIN — SODIUM CHLORIDE 500 ML: 9 INJECTION, SOLUTION INTRAVENOUS at 16:15

## 2020-11-26 RX ADMIN — SODIUM CHLORIDE: 9 INJECTION, SOLUTION INTRAVENOUS at 16:55

## 2020-11-26 RX ADMIN — ACETAMINOPHEN 1000 MG: 500 TABLET, FILM COATED ORAL at 19:37

## 2020-11-26 RX ADMIN — SODIUM CHLORIDE 2000 MG: 900 INJECTION, SOLUTION INTRAVENOUS at 20:24

## 2020-11-26 ASSESSMENT — MIFFLIN-ST. JEOR: SCORE: 1889.05

## 2020-11-26 NOTE — TELEPHONE ENCOUNTER
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 Need to do more surgery on Wed, wound is open and is packed.     Had some cardiac complications during surgery.   Plan is for more surgery Wed.     But today left leg is swollen up to knee. Daughter said it's all wrapped. Says it is difficult for him to move or walk at all.     Recommended ER. She verbalized understanding and will do that now.       Reason for Disposition    [1] Can't walk or can barely walk AND [2] new onset    Additional Information    Negative: Severe difficulty breathing (e.g., struggling for each breath, speaks in single words)    Negative: Looks like a broken bone or dislocated joint (e.g., crooked or deformed)    Negative: Sounds like a life-threatening emergency to the triager    Negative: Chest pain    Negative: Followed a leg injury    Negative: [1] Small area of swelling AND [2] followed an insect bite to the area    Negative: Swelling of one ankle joint    Negative: Swelling of knee is main symptom    Negative: Pregnant    Negative: Postpartum (from 0 to 6 weeks after delivery)    Protocols used: LEG SWELLING AND EDEMA-A-AH

## 2020-11-26 NOTE — ED TRIAGE NOTES
Pt reports LLE pain from his knee down to his foot, increases in intensity with movement.  Pt's leg is swollen and warm to the touch.  Pt recently had surgery (Monday) on his throat (carcinoma), tissue was removed and grafting is to be done next week at [facility] so he has an open wound right now (but this is not his complaint today).  Pt has difficulty ambulating and transferring due to increased pain.  He's concerned about a possible DVT.

## 2020-11-27 LAB
CRYSTALS SNV MICRO: ABNORMAL
SPECIMEN SOURCE SNV: ABNORMAL

## 2020-11-27 NOTE — PHARMACY-VANCOMYCIN DOSING SERVICE
Pharmacy Vancomycin Initial Note  Date of Service 2020  Patient's  1945  75 year old, male    Indication: Bone and Joint Infection    Current estimated CrCl = Estimated Creatinine Clearance: 57.1 mL/min (A) (based on SCr of 1.4 mg/dL (H)).    Creatinine for last 3 days  2020:  3:46 PM Creatinine 1.40 mg/dL    Recent Vancomycin Level(s) for last 3 days  No results found for requested labs within last 72 hours.      Vancomycin IV Administrations (past 72 hours)      No vancomycin orders with administrations in past 72 hours.                Nephrotoxins and other renal medications (From now, onward)    Start     Dose/Rate Route Frequency Ordered Stop    20 1900  vancomycin (VANCOCIN) 2,000 mg in sodium chloride 0.9 % 500 mL intermittent infusion      2,000 mg  over 2 Hours Intravenous EVERY 24 HOURS 20 1837 20 1859    20 1840  vancomycin (VANCOCIN) 2,500 mg in sodium chloride 0.9 % 500 mL intermittent infusion      2,500 mg  over 2 Hours Intravenous ONCE 20 1835            Contrast Orders - past 72 hours (72h ago, onward)    None                Plan:  1.  Start vancomycin 2000 mg IV q24h. 48 hour (2 dose) auto-stop per [facility] vancomycin use guidelines.  2.  Goal Trough Level: 15-20 mg/L   3.  Pharmacy will check trough levels as appropriate in 1-3 Days.    4. Serum creatinine levels will be ordered daily for the first week of therapy and at least twice weekly for subsequent weeks.    5. [place] method utilized to dose vancomycin therapy: Method 1, professional judgement given patient's advanced age.    Unruly Sanchez [facility]

## 2020-11-27 NOTE — ED NOTES
Graciela nursing supervisor at [facility] called for bed placement on medical surgical floor with ortho availability. She is calling back. Requested covid negative for bed placement. MD notified. Rapid covid test to be ordered.

## 2020-12-02 ENCOUNTER — TRANSFERRED RECORDS (OUTPATIENT)
Dept: HEALTH INFORMATION MANAGEMENT | Facility: OTHER | Age: 75
End: 2020-12-02

## 2020-12-02 LAB
BACTERIA SPEC CULT: NO GROWTH
BACTERIA SPEC CULT: NORMAL
BACTERIA SPEC CULT: NORMAL
SPECIMEN SOURCE: NORMAL

## 2020-12-11 ENCOUNTER — OFFICE VISIT (OUTPATIENT)
Dept: FAMILY MEDICINE | Facility: OTHER | Age: 75
End: 2020-12-11
Attending: PHYSICIAN ASSISTANT
Payer: COMMERCIAL

## 2020-12-11 VITALS
BODY MASS INDEX: 35.29 KG/M2 | RESPIRATION RATE: 20 BRPM | OXYGEN SATURATION: 97 % | DIASTOLIC BLOOD PRESSURE: 70 MMHG | HEART RATE: 60 BPM | WEIGHT: 256.6 LBS | TEMPERATURE: 96.6 F | SYSTOLIC BLOOD PRESSURE: 130 MMHG

## 2020-12-11 DIAGNOSIS — M10.9 ACUTE GOUT INVOLVING TOE OF LEFT FOOT, UNSPECIFIED CAUSE: Primary | ICD-10-CM

## 2020-12-11 PROCEDURE — 99213 OFFICE O/P EST LOW 20 MIN: CPT | Performed by: PHYSICIAN ASSISTANT

## 2020-12-11 PROCEDURE — G0463 HOSPITAL OUTPT CLINIC VISIT: HCPCS

## 2020-12-11 RX ORDER — LEVOFLOXACIN 750 MG/1
750 TABLET, FILM COATED ORAL
COMMUNITY
Start: 2020-12-02 | End: 2020-12-12

## 2020-12-11 RX ORDER — INDOMETHACIN 50 MG/1
50 CAPSULE ORAL
Qty: 21 CAPSULE | Refills: 0 | Status: SHIPPED | OUTPATIENT
Start: 2020-12-11 | End: 2021-02-10

## 2020-12-11 RX ORDER — CEPHALEXIN 500 MG/1
CAPSULE ORAL
COMMUNITY
Start: 2020-12-01 | End: 2021-01-25

## 2020-12-11 ASSESSMENT — PAIN SCALES - GENERAL: PAINLEVEL: MILD PAIN (2)

## 2020-12-11 NOTE — NURSING NOTE
Patient here for gout in the left great toe. He has an infection in the left knee and he just had cancer removed from the right chin. Medication Reconciliation: complete.    Brigette Garnica LPN  12/11/2020 3:33 PM

## 2020-12-11 NOTE — PROGRESS NOTES
SUBJECTIVE:     HPI  Bryan Kearney is a 75 year old male who presents to clinic today for evaluation of gout flare of left great toe that began a couple days ago and has been progressively worsening. Patient has a history of gout, last flare noted in chart is 2019. Has responded well with indomethacin. Had previously been on allopurinol but renal functions declined so it was discontinued. Has not taken anything for symptomatic relief with current flare. No fever/chills, wound, bleeding, pus, or drainage.     Review of Systems   Per HPI.     PAST MEDICAL HISTORY:   Past Medical History:   Diagnosis Date     Electrocution     H/o electrocution 13,800 volts     Essential (primary) hypertension     Hypertension     Gastro-esophageal reflux disease without esophagitis     G E R D       PAST SURGICAL HISTORY:   Past Surgical History:   Procedure Laterality Date     APPENDECTOMY OPEN      No Comments Provided     ARTHROSCOPY SHOULDER ROTATOR CUFF REPAIR      No Comments Provided     COLONOSCOPY      2001,Next colonoscopy due in .     OTHER SURGICAL HISTORY      ,CAROTID ENDARDECTOMY,Left carotid endarterectomy secondary to high grade stenosis     OTHER SURGICAL HISTORY      ,CAROTID ENDARDECTOMY,Bilateral endarterectomy       FAMILY HISTORY:   Family History   Problem Relation Age of Onset     Cancer Father         Cancer,Lymphoma     Colon Cancer Brother         Cancer-colon       SOCIAL HISTORY:   Social History     Tobacco Use     Smoking status: Former Smoker     Packs/day: 1.00     Years: 25.00     Pack years: 25.00     Quit date: 2020     Years since quittin.7     Smokeless tobacco: Never Used   Substance Use Topics     Alcohol use: Not Currently     Alcohol/week: 1.0 standard drinks     Comment: Rare      No Known Allergies  Current Outpatient Medications   Medication     amLODIPine (NORVASC) 5 MG tablet     aspirin 325 MG tablet     atenolol (TENORMIN) 50 MG tablet      atorvastatin (LIPITOR) 20 MG tablet     cephALEXin (KEFLEX) 500 MG capsule     hydrochlorothiazide (HYDRODIURIL) 25 MG tablet     HYDROcodone-acetaminophen (NORCO) 7.5-325 MG per tablet     indomethacin (INDOCIN) 50 MG capsule     levofloxacin (LEVAQUIN) 750 MG tablet     tamsulosin (FLOMAX) 0.4 MG capsule     fluorouracil (EFUDEX) 5 % external cream     No current facility-administered medications for this visit.          OBJECTIVE:     /70   Pulse 60   Temp 96.6  F (35.9  C)   Resp 20   Wt 116.4 kg (256 lb 9.6 oz)   SpO2 97%   BMI 35.29 kg/m    Body mass index is 35.29 kg/m .  Physical Exam  General: Pleasant, in no apparent distress.  Cardiovascular: Regular rate and rhythm with S1 equal to S2. No murmurs, friction rubs, or gallops.   Respiratory: Lungs are resonant and clear to auscultation bilaterally. No wheezes, crackles, or rhonchi.  Skin: Erythematous, swollen left great toe that is tender to touch.   Psych: Appropriate mood and affect.      ASSESSMENT/PLAN:     (M10.9) Acute gout involving toe of left foot, unspecified cause  (primary encounter diagnosis)  Comment: Symptoms and exam consistent with acute gout flare. Will treat with indocin as below. Educated on medication, use, and side effects. Follow up as needed.   Plan: indomethacin (INDOCIN) 50 MG capsule            Rita Mcclure PA-C  [facility]

## 2020-12-17 ENCOUNTER — HOSPITAL ENCOUNTER (OUTPATIENT)
Dept: CARDIOLOGY | Facility: OTHER | Age: 75
Discharge: HOME | End: 2020-12-17
Attending: FAMILY MEDICINE | Admitting: FAMILY MEDICINE
Payer: COMMERCIAL

## 2020-12-17 DIAGNOSIS — R01.1 SYSTOLIC MURMUR: ICD-10-CM

## 2020-12-17 PROCEDURE — 93306 TTE W/DOPPLER COMPLETE: CPT | Mod: 26 | Performed by: INTERNAL MEDICINE

## 2020-12-17 PROCEDURE — 93306 TTE W/DOPPLER COMPLETE: CPT

## 2021-01-12 ENCOUNTER — TRANSFERRED RECORDS (OUTPATIENT)
Dept: HEALTH INFORMATION MANAGEMENT | Facility: OTHER | Age: 76
End: 2021-01-12

## 2021-01-12 DIAGNOSIS — I10 ESSENTIAL HYPERTENSION: Primary | ICD-10-CM

## 2021-01-13 RX ORDER — HYDROCHLOROTHIAZIDE 25 MG/1
TABLET ORAL
Qty: 90 TABLET | Refills: 3 | Status: SHIPPED | OUTPATIENT
Start: 2021-01-13 | End: 2022-01-05

## 2021-01-13 NOTE — TELEPHONE ENCOUNTER
[facility] sent Rx request for the following:      Requested Prescriptions   Pending Prescriptions Disp Refills   hydrochlorothiazide (HYDRODIURIL) 25 MG tablet 90 tablet     Sig: TAKE ONE TABLET ORALLY ONCE A DAY   Last Prescription Date:   10/8/20  Last Fill Qty/Refills:         90, R-0  Last Office Visit:              12/11/20  Future Office visit:           None  Routing refill request to provider for review/approval because:   Diuretics (Including Combos) Protocol Failed - 1/13/2021  9:42 AM       Failed - Normal serum creatinine on file in past 12 months     Unable to complete prescription refill per RN Medication Refill Policy. Frieda Hobbs RN .............. 1/13/2021  9:54 AM

## 2021-01-21 ENCOUNTER — OFFICE VISIT (OUTPATIENT)
Dept: FAMILY MEDICINE | Facility: OTHER | Age: 76
End: 2021-01-21
Attending: FAMILY MEDICINE
Payer: COMMERCIAL

## 2021-01-21 VITALS
SYSTOLIC BLOOD PRESSURE: 120 MMHG | OXYGEN SATURATION: 97 % | DIASTOLIC BLOOD PRESSURE: 70 MMHG | BODY MASS INDEX: 34.3 KG/M2 | TEMPERATURE: 96.9 F | WEIGHT: 249.4 LBS | HEART RATE: 59 BPM | RESPIRATION RATE: 20 BRPM

## 2021-01-21 DIAGNOSIS — M54.50 LUMBAR BACK PAIN: ICD-10-CM

## 2021-01-21 DIAGNOSIS — I80.9 THROMBOPHLEBITIS: Primary | ICD-10-CM

## 2021-01-21 DIAGNOSIS — M54.2 NECK PAIN: ICD-10-CM

## 2021-01-21 PROCEDURE — 99214 OFFICE O/P EST MOD 30 MIN: CPT | Performed by: FAMILY MEDICINE

## 2021-01-21 PROCEDURE — G0463 HOSPITAL OUTPT CLINIC VISIT: HCPCS

## 2021-01-21 RX ORDER — HYDROCODONE BITARTRATE AND ACETAMINOPHEN 7.5; 325 MG/1; MG/1
1 TABLET ORAL EVERY 4 HOURS PRN
Qty: 60 TABLET | Refills: 0 | Status: SHIPPED | OUTPATIENT
Start: 2021-01-21 | End: 2021-04-06

## 2021-01-21 ASSESSMENT — PAIN SCALES - GENERAL: PAINLEVEL: MODERATE PAIN (5)

## 2021-01-21 NOTE — NURSING NOTE
Patient here for left hand painful, red and swollen after his IV treatment of Bamlanivimab study drug for COVID. He was positive for COVID on 01/12/2021. Medication Reconciliation: complete.    Brigette Garnica LPN  1/21/2021 1:32 PM

## 2021-01-24 NOTE — PROGRESS NOTES
SUBJECTIVE:   Bryan Kearney is a 75 year old male who presents to clinic today for the following health issues: Left wrist swelling    Patient arrives here for left wrist swelling.  Recently got ABbamlanivimab to have a note because of a positive Covid test.  They had tried multiple sticks to get IV access only to find one is left wrist.  Shortly after getting his IV infusion which is considered a study drug who started developing red and pain.  Reports that the burning in sensation.  IV was done 2 days ago.  He is otherwise feeling well.  Is not having any symptoms from Covid such as cough fevers chills or other complaints.  Also is requesting a refill of his hydrocodone.  He has had to take a few extra pills because of the pain in his wrist.  But for the most part he is tolerating it well.  His last refill for 30 tablets was December 2.   reviewed.    Written Drug Qty Days Prescriber Rx # Pharmacy Refill   Daily Dose* Pymt Type [facility]       12/02/2020  1   12/02/2020  Hydrocodone-Acetamin 5-325 MG  30.00  8 [address]   [id number]   [facility] (5150)   0  18.75 MME  [place]   11/23/2020  1   11/23/2020  Hydrocodone-Acetamin 5-325 MG  25.00  5 De Web   [id number]   [facility] (5150)   [place]  25.00 [place]   09/28/2020  1   09/28/2020  Hydrocodone-Acetamin 7.5-325  60.00  10 Mi Lie   [id number]   [facility] (1789)   0  45.00 [place]   04/15/2020  1   04/13/2020  Hydrocodone-Acetamin 7.5-325  30.00  5 Mi Lie   [id number]   Ess (9021)   [place]  45.00 [place]   04/10/2020  1   03/24/2020  Carisoprodol 350 MG Tablet  40.00  10 Mi Lie   [id number]   [facility] (1789)   1  1.12 L[place]   03/24/2020  1   03/24/2020  Carisoprodol 350 MG Tablet  40.00  10 Mi Lie   [id number]   [facility] (1789)   0  1.12 [place]   03/17/2020  1   03/17/2020  Hydrocodone-Acetamin 7.5-325  30.00  5 Mi Lie   [id number]   [facility] (1789)   0  45.00 [place]ME  [place]   03/02/2020  1   03/02/2020  Hydrocodone-Acetamin 5-325 MG  40.00  10 [facility]   [id number]    Gra (1789)   0  20.00 MME             Patient Active Problem List    Diagnosis Date Noted     Morbid obesity (H) 11/09/2020     Priority: Medium     Stenosis of left carotid artery 11/05/2020     Priority: Medium     Systolic murmur 11/05/2020     Priority: Medium     Benign prostatic hyperplasia with urinary frequency 03/02/2020     Priority: Medium     Hip pain, left 02/11/2019     Priority: Medium     Acute cystitis 10/10/2018     Priority: Medium     Esophageal reflux 09/05/2018     Priority: Medium     Degenerative joint disease of cervical and lumbar spine 09/05/2018     Priority: Medium     Actinic keratosis 11/29/2015     Priority: Medium     Solar keratosis 05/29/2015     Priority: Medium     Colonoscopy refused 07/29/2013     Priority: Medium     Overview:   Discussed and refused 07/2013       Hypercholesterolemia 05/11/2011     Priority: Medium     Gout 04/14/2010     Priority: Medium     Benign neoplasm of colon 09/18/2009     Priority: Medium     Overview:   benign       Hearing loss 09/18/2009     Priority: Medium     HTN (hypertension) 12/24/2007     Priority: Medium     Overview:   IMO Update 10/11       Past Medical History:   Diagnosis Date     Electrocution     H/o electrocution 13,800 volts     Essential (primary) hypertension     Hypertension     Gastro-esophageal reflux disease without esophagitis     G E R D      Past Surgical History:   Procedure Laterality Date     APPENDECTOMY OPEN      No Comments Provided     ARTHROSCOPY SHOULDER ROTATOR CUFF REPAIR      No Comments Provided     COLONOSCOPY      06/04/2001,Next colonoscopy due in 2011.     OTHER SURGICAL HISTORY      ,CAROTID ENDARDECTOMY,Left carotid endarterectomy secondary to high grade stenosis     OTHER SURGICAL HISTORY      ,CAROTID ENDARDECTOMY,Bilateral endarterectomy     No Known Allergies    Review of Systems     OBJECTIVE:     /70   Pulse 59   Temp 96.9  F (36.1  C)   Resp 20   Wt 113.1 kg (249 lb 6.4 oz)    SpO2 97%   BMI 34.30 kg/m    Body mass index is 34.3 kg/m .  Physical Exam  Constitutional:       Appearance: Normal appearance.   Skin:     General: Skin is warm.      Comments: There is a small puncture wound that is healing on his left wrist slight extension of the cord consistent with superficial thrombosis.  No evidence of infection.  No fluctuant mass.   Neurological:      Mental Status: He is alert.   Psychiatric:         Mood and Affect: Mood normal.         Behavior: Behavior normal.             ASSESSMENT/PLAN:         1. Lumbar back pain  Refill  - HYDROcodone-acetaminophen (NORCO) 7.5-325 MG per tablet; Take 1 tablet by mouth every 4 hours as needed for severe pain  Dispense: 60 tablet; Refill: 0    2. Neck pain  Refill  - HYDROcodone-acetaminophen (NORCO) 7.5-325 MG per tablet; Take 1 tablet by mouth every 4 hours as needed for severe pain  Dispense: 60 tablet; Refill: 0    Thrombophlebitis.  Currently no signs of secondary infection.  Recommended warm moist compresses.  Advised following up if getting worse  Abundio Abdi MD  [facility]

## 2021-01-25 ENCOUNTER — OFFICE VISIT (OUTPATIENT)
Dept: FAMILY MEDICINE | Facility: OTHER | Age: 76
End: 2021-01-25
Attending: FAMILY MEDICINE
Payer: COMMERCIAL

## 2021-01-25 VITALS
BODY MASS INDEX: 34.49 KG/M2 | HEART RATE: 60 BPM | OXYGEN SATURATION: 97 % | RESPIRATION RATE: 20 BRPM | DIASTOLIC BLOOD PRESSURE: 78 MMHG | SYSTOLIC BLOOD PRESSURE: 126 MMHG | WEIGHT: 250.8 LBS | TEMPERATURE: 99 F

## 2021-01-25 DIAGNOSIS — N18.30 STAGE 3 CHRONIC KIDNEY DISEASE, UNSPECIFIED WHETHER STAGE 3A OR 3B CKD (H): ICD-10-CM

## 2021-01-25 DIAGNOSIS — M79.89 SWELLING OF LEFT HAND: Primary | ICD-10-CM

## 2021-01-25 DIAGNOSIS — M10.00 IDIOPATHIC GOUT, UNSPECIFIED CHRONICITY, UNSPECIFIED SITE: ICD-10-CM

## 2021-01-25 DIAGNOSIS — E66.01 MORBID OBESITY (H): ICD-10-CM

## 2021-01-25 LAB
BASOPHILS # BLD AUTO: 0.1 10E9/L (ref 0–0.2)
BASOPHILS NFR BLD AUTO: 0.5 %
DIFFERENTIAL METHOD BLD: NORMAL
EOSINOPHIL # BLD AUTO: 0.3 10E9/L (ref 0–0.7)
EOSINOPHIL NFR BLD AUTO: 2.8 %
ERYTHROCYTE [DISTWIDTH] IN BLOOD BY AUTOMATED COUNT: 12.2 % (ref 10–15)
HCT VFR BLD AUTO: 42.6 % (ref 40–53)
HGB BLD-MCNC: 14.6 G/DL (ref 13.3–17.7)
IMM GRANULOCYTES # BLD: 0 10E9/L (ref 0–0.4)
IMM GRANULOCYTES NFR BLD: 0.4 %
LYMPHOCYTES # BLD AUTO: 2.1 10E9/L (ref 0.8–5.3)
LYMPHOCYTES NFR BLD AUTO: 21.1 %
MCH RBC QN AUTO: 29.7 PG (ref 26.5–33)
MCHC RBC AUTO-ENTMCNC: 34.3 G/DL (ref 31.5–36.5)
MCV RBC AUTO: 87 FL (ref 78–100)
MONOCYTES # BLD AUTO: 1.1 10E9/L (ref 0–1.3)
MONOCYTES NFR BLD AUTO: 11.1 %
NEUTROPHILS # BLD AUTO: 6.4 10E9/L (ref 1.6–8.3)
NEUTROPHILS NFR BLD AUTO: 64.1 %
PLATELET # BLD AUTO: 347 10E9/L (ref 150–450)
RBC # BLD AUTO: 4.92 10E12/L (ref 4.4–5.9)
WBC # BLD AUTO: 10 10E9/L (ref 4–11)

## 2021-01-25 PROCEDURE — G0463 HOSPITAL OUTPT CLINIC VISIT: HCPCS

## 2021-01-25 PROCEDURE — 36415 COLL VENOUS BLD VENIPUNCTURE: CPT | Mod: ZL | Performed by: FAMILY MEDICINE

## 2021-01-25 PROCEDURE — 99213 OFFICE O/P EST LOW 20 MIN: CPT | Performed by: FAMILY MEDICINE

## 2021-01-25 PROCEDURE — 85025 COMPLETE CBC W/AUTO DIFF WBC: CPT | Mod: ZL | Performed by: FAMILY MEDICINE

## 2021-01-25 RX ORDER — ALBUTEROL SULFATE 90 UG/1
2 AEROSOL, METERED RESPIRATORY (INHALATION)
COMMUNITY
Start: 2021-01-18 | End: 2023-07-27

## 2021-01-25 RX ORDER — PREDNISONE 20 MG/1
TABLET ORAL
Qty: 20 TABLET | Refills: 0 | Status: SHIPPED | OUTPATIENT
Start: 2021-01-25 | End: 2021-03-30

## 2021-01-25 RX ORDER — CEPHALEXIN 500 MG/1
500 CAPSULE ORAL 3 TIMES DAILY
Qty: 21 CAPSULE | Refills: 0 | Status: SHIPPED | OUTPATIENT
Start: 2021-01-25 | End: 2021-02-01

## 2021-01-25 ASSESSMENT — PAIN SCALES - GENERAL: PAINLEVEL: MODERATE PAIN (5)

## 2021-01-25 NOTE — PROGRESS NOTES
SUBJECTIVE:   Bryan Kearney is a 75 year old male who presents to clinic today for the following health issues: Ongoing wrist swelling    Patient arrives here for ongoing left wrist swelling.  He was recently seen and felt to have a superficial DVT.  He reports the pain is increased.  He has trouble flexing extending it.  He is concerned about gout.  He recently did have a infusion with placement of IV access on his left wrist        Patient Active Problem List    Diagnosis Date Noted     Chronic kidney disease, stage 3 01/25/2021     Priority: Medium     Morbid obesity (H) 11/09/2020     Priority: Medium     Stenosis of left carotid artery 11/05/2020     Priority: Medium     Systolic murmur 11/05/2020     Priority: Medium     Benign prostatic hyperplasia with urinary frequency 03/02/2020     Priority: Medium     Hip pain, left 02/11/2019     Priority: Medium     Acute cystitis 10/10/2018     Priority: Medium     Esophageal reflux 09/05/2018     Priority: Medium     Degenerative joint disease of cervical and lumbar spine 09/05/2018     Priority: Medium     Actinic keratosis 11/29/2015     Priority: Medium     Solar keratosis 05/29/2015     Priority: Medium     Colonoscopy refused 07/29/2013     Priority: Medium     Overview:   Discussed and refused 07/2013       Hypercholesterolemia 05/11/2011     Priority: Medium     Gout 04/14/2010     Priority: Medium     Benign neoplasm of colon 09/18/2009     Priority: Medium     Overview:   benign       Hearing loss 09/18/2009     Priority: Medium     HTN (hypertension) 12/24/2007     Priority: Medium     Overview:   IMO Update 10/11       Past Medical History:   Diagnosis Date     Electrocution     H/o electrocution 13,800 volts     Essential (primary) hypertension     Hypertension     Gastro-esophageal reflux disease without esophagitis     G E R D        Review of Systems     OBJECTIVE:     /78   Pulse 60   Temp 99  F (37.2  C)   Resp 20   Wt 113.8 kg (250 lb  12.8 oz)   SpO2 97%   BMI 34.49 kg/m    Body mass index is 34.49 kg/m .  Physical Exam  Skin:     General: Skin is warm.      Comments: Skin is warm to touch.  Decreased range of motion.  Some dorsal hand redness   Neurological:      Mental Status: He is alert.         Diagnostic Test Results:  Results for orders placed or performed in visit on 01/25/21   CBC and Differential     Status: None   Result Value Ref Range    WBC 10.0 4.0 - 11.0 10e9/L    RBC Count 4.92 4.4 - 5.9 10e12/L    Hemoglobin 14.6 13.3 - 17.7 g/dL    Hematocrit 42.6 40.0 - 53.0 %    MCV 87 78 - 100 fl    MCH 29.7 26.5 - 33.0 pg    MCHC 34.3 31.5 - 36.5 g/dL    RDW 12.2 10.0 - 15.0 %    Platelet Count 347 150 - 450 10e9/L    Diff Method Automated Method     % Neutrophils 64.1 %    % Lymphocytes 21.1 %    % Monocytes 11.1 %    % Eosinophils 2.8 %    % Basophils 0.5 %    % Immature Granulocytes 0.4 %    Absolute Neutrophil 6.4 1.6 - 8.3 10e9/L    Absolute Lymphocytes 2.1 0.8 - 5.3 10e9/L    Absolute Monocytes 1.1 0.0 - 1.3 10e9/L    Absolute Eosinophils 0.3 0.0 - 0.7 10e9/L    Absolute Basophils 0.1 0.0 - 0.2 10e9/L    Abs Immature Granulocytes 0.0 0 - 0.4 10e9/L         ASSESSMENT/PLAN:         1. Swelling of left hand  Gout versus cellulitis.  For possible cellulitis will treat with Keflex.  Start prednisone for gout.  Currently in favor gout follow-up if not improving  - CBC and Differential; Future  - CBC and Differential  - cephALEXin (KEFLEX) 500 MG capsule; Take 1 capsule (500 mg) by mouth 3 times daily for 7 days  Dispense: 21 capsule; Refill: 0    2. Stage 3 chronic kidney disease, unspecified whether stage 3a or 3b CKD  Epic required update    3. Morbid obesity (H)  Epic required update    4. Idiopathic gout, unspecified chronicity, unspecified site    - predniSONE (DELTASONE) 20 MG tablet; Take 3 tabs by mouth daily x 3 days, then 2 tabs daily x 3 days, then 1 tab daily x 3 days, then 1/2 tab daily x 3 days.  Dispense: 20 tablet;  Refill: 0      Abundio Abdi MD  [facility] AND [facility]

## 2021-02-10 ENCOUNTER — OFFICE VISIT (OUTPATIENT)
Dept: FAMILY MEDICINE | Facility: OTHER | Age: 76
End: 2021-02-10
Attending: FAMILY MEDICINE
Payer: COMMERCIAL

## 2021-02-10 VITALS
WEIGHT: 257 LBS | BODY MASS INDEX: 35.34 KG/M2 | SYSTOLIC BLOOD PRESSURE: 138 MMHG | TEMPERATURE: 97.8 F | HEART RATE: 67 BPM | OXYGEN SATURATION: 97 % | DIASTOLIC BLOOD PRESSURE: 72 MMHG | RESPIRATION RATE: 16 BRPM

## 2021-02-10 DIAGNOSIS — M10.00 IDIOPATHIC GOUT, UNSPECIFIED CHRONICITY, UNSPECIFIED SITE: Primary | ICD-10-CM

## 2021-02-10 DIAGNOSIS — M65.4 DE QUERVAIN'S DISEASE (TENOSYNOVITIS): ICD-10-CM

## 2021-02-10 DIAGNOSIS — M10.9 ACUTE GOUT INVOLVING TOE OF LEFT FOOT, UNSPECIFIED CAUSE: ICD-10-CM

## 2021-02-10 PROCEDURE — G0463 HOSPITAL OUTPT CLINIC VISIT: HCPCS

## 2021-02-10 PROCEDURE — 99213 OFFICE O/P EST LOW 20 MIN: CPT | Performed by: FAMILY MEDICINE

## 2021-02-10 RX ORDER — INDOMETHACIN 50 MG/1
50 CAPSULE ORAL
Qty: 30 CAPSULE | Refills: 3 | Status: SHIPPED | OUTPATIENT
Start: 2021-02-10 | End: 2021-06-23

## 2021-02-10 ASSESSMENT — PAIN SCALES - GENERAL: PAINLEVEL: MODERATE PAIN (4)

## 2021-02-10 NOTE — NURSING NOTE
Patient here for left hand and foot swelling and pain. His concern on his hands has cleared up and look clear. Medication Reconciliation: complete.    Brigette Garnica LPN  2/10/2021 10:22 AM

## 2021-02-10 NOTE — PROGRESS NOTES
SUBJECTIVE:   Bryan Kearney is a 75 year old male who presents to clinic today for the following health issues: Wrist swelling    Patient arrives here because of ongoing wrist swelling and pain.  Located over the IV site.  Has difficulty hand grasp.  Patient is also requesting a refill of Indocin for his gout attacks        Patient Active Problem List    Diagnosis Date Noted     Chronic kidney disease, stage 3 01/25/2021     Priority: Medium     Morbid obesity (H) 11/09/2020     Priority: Medium     Stenosis of left carotid artery 11/05/2020     Priority: Medium     Systolic murmur 11/05/2020     Priority: Medium     Benign prostatic hyperplasia with urinary frequency 03/02/2020     Priority: Medium     Hip pain, left 02/11/2019     Priority: Medium     Acute cystitis 10/10/2018     Priority: Medium     Esophageal reflux 09/05/2018     Priority: Medium     Degenerative joint disease of cervical and lumbar spine 09/05/2018     Priority: Medium     Actinic keratosis 11/29/2015     Priority: Medium     Solar keratosis 05/29/2015     Priority: Medium     Colonoscopy refused 07/29/2013     Priority: Medium     Overview:   Discussed and refused 07/2013       Hypercholesterolemia 05/11/2011     Priority: Medium     Gout 04/14/2010     Priority: Medium     Benign neoplasm of colon 09/18/2009     Priority: Medium     Overview:   benign       Hearing loss 09/18/2009     Priority: Medium     HTN (hypertension) 12/24/2007     Priority: Medium     Overview:   IMO Update 10/11       Past Medical History:   Diagnosis Date     Electrocution     H/o electrocution 13,800 volts     Essential (primary) hypertension     Hypertension     Gastro-esophageal reflux disease without esophagitis     G E R D        Review of Systems     OBJECTIVE:     /72 (BP Location: Right leg)   Pulse 67   Temp 97.8  F (36.6  C)   Resp 16   Wt 116.6 kg (257 lb)   SpO2 97%   BMI 35.34 kg/m    Body mass index is 35.34 kg/m .  Physical  Exam  Musculoskeletal: Normal range of motion.         General: Tenderness present. No swelling, deformity or signs of injury.      Comments: Positive Finkelstein sign.   Neurological:      Mental Status: He is alert.         Diagnostic Test Results:  none     ASSESSMENT/PLAN:         1. Idiopathic gout, unspecified chronicity, unspecified site  Recent normal GFR.  Refill Indocin    2. Acute gout involving toe of left foot, unspecified cause    - indomethacin (INDOCIN) 50 MG capsule; Take 1 capsule (50 mg) by mouth 3 times daily (with meals) As needed  Dispense: 30 capsule; Refill: 3    3. De Quervain's disease (tenosynovitis)  Rest.  Follow-up in 2 weeks if not improving remove splint during the night  - ELASTIC WRIST SPLINT      Abundio Abdi MD  [facility] AND [facility] [place]

## 2021-02-11 ENCOUNTER — TRANSFERRED RECORDS (OUTPATIENT)
Dept: HEALTH INFORMATION MANAGEMENT | Facility: OTHER | Age: 76
End: 2021-02-11

## 2021-03-22 DIAGNOSIS — N40.1 BENIGN PROSTATIC HYPERPLASIA WITH URINARY FREQUENCY: ICD-10-CM

## 2021-03-22 DIAGNOSIS — R35.0 BENIGN PROSTATIC HYPERPLASIA WITH URINARY FREQUENCY: ICD-10-CM

## 2021-03-22 RX ORDER — TAMSULOSIN HYDROCHLORIDE 0.4 MG/1
0.4 CAPSULE ORAL DAILY
Qty: 90 CAPSULE | Refills: 2 | Status: SHIPPED | OUTPATIENT
Start: 2021-03-22 | End: 2021-12-27

## 2021-03-22 NOTE — TELEPHONE ENCOUNTER
"Requested Prescriptions   Pending Prescriptions Disp Refills     tamsulosin (FLOMAX) 0.4 MG capsule 90 capsule 3     Sig: Take 1 capsule (0.4 mg) by mouth daily       Alpha Blockers Passed - 3/22/2021  2:19 PM        Passed - Blood pressure under 140/90 in past 12 months     BP Readings from Last 3 Encounters:   02/10/21 138/72   01/25/21 126/78   01/21/21 120/70                 Passed - Recent (12 mo) or future (30 days) visit within the authorizing provider's specialty     Patient has had an office visit with the authorizing provider or a provider within the authorizing providers department within the previous 12 mos or has a future within next 30 days. See \"Patient Info\" tab in inbasket, or \"Choose Columns\" in Meds & Orders section of the refill encounter.              Passed - Patient does not have Tadalafil, Vardenafil, or Sildenafil on their medication list        Passed - Medication is active on med list        Passed - Patient is 18 years of age or older           LOV 2/10/21 [place]  Prescription approved per [facility] Refill Protocol.  Brenda J. Goodell, RN on 3/22/2021 at 2:21 PM    "

## 2021-03-30 ENCOUNTER — HOSPITAL ENCOUNTER (OUTPATIENT)
Dept: GENERAL RADIOLOGY | Facility: OTHER | Age: 76
End: 2021-03-30
Attending: FAMILY MEDICINE
Payer: COMMERCIAL

## 2021-03-30 ENCOUNTER — OFFICE VISIT (OUTPATIENT)
Dept: FAMILY MEDICINE | Facility: OTHER | Age: 76
End: 2021-03-30
Attending: FAMILY MEDICINE
Payer: COMMERCIAL

## 2021-03-30 VITALS
OXYGEN SATURATION: 98 % | BODY MASS INDEX: 36 KG/M2 | TEMPERATURE: 96.4 F | RESPIRATION RATE: 16 BRPM | SYSTOLIC BLOOD PRESSURE: 118 MMHG | WEIGHT: 261.8 LBS | DIASTOLIC BLOOD PRESSURE: 64 MMHG | HEART RATE: 66 BPM

## 2021-03-30 DIAGNOSIS — M25.532 LEFT WRIST PAIN: Primary | ICD-10-CM

## 2021-03-30 DIAGNOSIS — M19.032 PRIMARY OSTEOARTHRITIS OF LEFT WRIST: ICD-10-CM

## 2021-03-30 DIAGNOSIS — M25.532 LEFT WRIST PAIN: ICD-10-CM

## 2021-03-30 LAB
BASOPHILS # BLD AUTO: 0 10E9/L (ref 0–0.2)
BASOPHILS NFR BLD AUTO: 0.4 %
DIFFERENTIAL METHOD BLD: ABNORMAL
EOSINOPHIL # BLD AUTO: 0.1 10E9/L (ref 0–0.7)
EOSINOPHIL NFR BLD AUTO: 1.1 %
ERYTHROCYTE [DISTWIDTH] IN BLOOD BY AUTOMATED COUNT: 13.3 % (ref 10–15)
HCT VFR BLD AUTO: 40.7 % (ref 40–53)
HGB BLD-MCNC: 14.3 G/DL (ref 13.3–17.7)
IMM GRANULOCYTES # BLD: 0 10E9/L (ref 0–0.4)
IMM GRANULOCYTES NFR BLD: 0.2 %
LYMPHOCYTES # BLD AUTO: 1.8 10E9/L (ref 0.8–5.3)
LYMPHOCYTES NFR BLD AUTO: 15.9 %
MCH RBC QN AUTO: 30.8 PG (ref 26.5–33)
MCHC RBC AUTO-ENTMCNC: 35.1 G/DL (ref 31.5–36.5)
MCV RBC AUTO: 88 FL (ref 78–100)
MONOCYTES # BLD AUTO: 1.2 10E9/L (ref 0–1.3)
MONOCYTES NFR BLD AUTO: 10.9 %
NEUTROPHILS # BLD AUTO: 8.1 10E9/L (ref 1.6–8.3)
NEUTROPHILS NFR BLD AUTO: 71.5 %
PLATELET # BLD AUTO: 212 10E9/L (ref 150–450)
RBC # BLD AUTO: 4.65 10E12/L (ref 4.4–5.9)
URATE SERPL-MCNC: 9.6 MG/DL (ref 4.4–7.6)
WBC # BLD AUTO: 11.3 10E9/L (ref 4–11)

## 2021-03-30 PROCEDURE — 36415 COLL VENOUS BLD VENIPUNCTURE: CPT | Mod: ZL | Performed by: FAMILY MEDICINE

## 2021-03-30 PROCEDURE — 84550 ASSAY OF BLOOD/URIC ACID: CPT | Mod: ZL | Performed by: FAMILY MEDICINE

## 2021-03-30 PROCEDURE — 99213 OFFICE O/P EST LOW 20 MIN: CPT | Performed by: FAMILY MEDICINE

## 2021-03-30 PROCEDURE — 73110 X-RAY EXAM OF WRIST: CPT | Mod: LT

## 2021-03-30 PROCEDURE — G0463 HOSPITAL OUTPT CLINIC VISIT: HCPCS

## 2021-03-30 PROCEDURE — 85025 COMPLETE CBC W/AUTO DIFF WBC: CPT | Mod: ZL | Performed by: FAMILY MEDICINE

## 2021-03-30 RX ORDER — PREDNISONE 20 MG/1
TABLET ORAL
Qty: 20 TABLET | Refills: 0 | Status: SHIPPED | OUTPATIENT
Start: 2021-03-30 | End: 2021-10-27

## 2021-03-30 ASSESSMENT — PAIN SCALES - GENERAL: PAINLEVEL: MODERATE PAIN (5)

## 2021-03-30 NOTE — NURSING NOTE
Patient here for left arm hand pain up to the elbow. Pain now 05/10 has been up to a 10/10 he feels he may have gout in the wrist. Medication Reconciliation: complete.    Brigette Garnica LPN  3/30/2021 9:06 AM

## 2021-03-30 NOTE — PROGRESS NOTES
SUBJECTIVE:   Bryan Kearney is a 75 year old male who presents to clinic today for the following health issues:  left wrist pain    Patient has ongoing left wrist pain.  States is around the wrist but does extend at the elbow.  Is been going on since he had an IV placed.  Initially thought thrombophlebitis but pain is persisted.  It is red inflamed.  I did review his medications and patient did stop his allopurinol.  He thinks he still has some at home but is not sure.  Does have a history of gout        Patient Active Problem List    Diagnosis Date Noted     Chronic kidney disease, stage 3 01/25/2021     Priority: Medium     Morbid obesity (H) 11/09/2020     Priority: Medium     Stenosis of left carotid artery 11/05/2020     Priority: Medium     Systolic murmur 11/05/2020     Priority: Medium     Benign prostatic hyperplasia with urinary frequency 03/02/2020     Priority: Medium     Hip pain, left 02/11/2019     Priority: Medium     Acute cystitis 10/10/2018     Priority: Medium     Esophageal reflux 09/05/2018     Priority: Medium     Degenerative joint disease of cervical and lumbar spine 09/05/2018     Priority: Medium     Actinic keratosis 11/29/2015     Priority: Medium     Solar keratosis 05/29/2015     Priority: Medium     Colonoscopy refused 07/29/2013     Priority: Medium     Overview:   Discussed and refused 07/2013       Hypercholesterolemia 05/11/2011     Priority: Medium     Gout 04/14/2010     Priority: Medium     Benign neoplasm of colon 09/18/2009     Priority: Medium     Overview:   benign       Hearing loss 09/18/2009     Priority: Medium     HTN (hypertension) 12/24/2007     Priority: Medium     Overview:   IMO Update 10/11       Past Medical History:   Diagnosis Date     Electrocution     H/o electrocution 13,800 volts     Essential (primary) hypertension     Hypertension     Gastro-esophageal reflux disease without esophagitis     G E R D        Review of Systems     OBJECTIVE:     BP  118/64   Pulse 66   Temp 96.4  F (35.8  C)   Resp 16   Wt 118.8 kg (261 lb 12.8 oz)   SpO2 98%   BMI 36.00 kg/m    Body mass index is 36 kg/m .  Physical Exam  Constitutional:       Appearance: Normal appearance.   Musculoskeletal:      Comments: Wrist has markedly decreased range of motion.  Swollen.  Warm to touch.   Neurological:      Mental Status: He is alert.         Diagnostic Test Results:  Results for orders placed or performed during the hospital encounter of 03/30/21   XR Wrist Left G/E 3 Views     Status: None    Narrative    PROCEDURE:  XR WRIST LEFT G/E 3 VIEWS    HISTORY: Left wrist pain.    COMPARISON:  8/5/2019    TECHNIQUE:  3 views.    FINDINGS:  There is moderate degenerative changes first metacarpal  carpal joint and navicular trapezial joint without change. Mild  degenerative changes radiocarpal joint with slight widening of the  navicular trapezial space unchanged.    No acute fracture.      Impression    IMPRESSION:   1. Moderate postoperative changes to the first metacarpocarpal joint  and navicular trapezial joint.  2. Degenerative changes radiocarpal joint and slight widening of the  luminal navicular joint space without change.  3. No acute fracture or no change.    NATALIA JENKINS MD   Results for orders placed or performed in visit on 03/30/21   Uric acid     Status: Abnormal   Result Value Ref Range    Uric Acid 9.6 (H) 4.4 - 7.6 mg/dL   CBC and Differential     Status: Abnormal   Result Value Ref Range    WBC 11.3 (H) 4.0 - 11.0 10e9/L    RBC Count 4.65 4.4 - 5.9 10e12/L    Hemoglobin 14.3 13.3 - 17.7 g/dL    Hematocrit 40.7 40.0 - 53.0 %    MCV 88 78 - 100 fl    MCH 30.8 26.5 - 33.0 pg    MCHC 35.1 31.5 - 36.5 g/dL    RDW 13.3 10.0 - 15.0 %    Platelet Count 212 150 - 450 10e9/L    Diff Method Automated Method     % Neutrophils 71.5 %    % Lymphocytes 15.9 %    % Monocytes 10.9 %    % Eosinophils 1.1 %    % Basophils 0.4 %    % Immature Granulocytes 0.2 %    Absolute  Neutrophil 8.1 1.6 - 8.3 10e9/L    Absolute Lymphocytes 1.8 0.8 - 5.3 10e9/L    Absolute Monocytes 1.2 0.0 - 1.3 10e9/L    Absolute Eosinophils 0.1 0.0 - 0.7 10e9/L    Absolute Basophils 0.0 0.0 - 0.2 10e9/L    Abs Immature Granulocytes 0.0 0 - 0.4 10e9/L         ASSESSMENT/PLAN:         1. Wrist pain versus gout versus degenerative joint disease  X-rays do show significant amount of arthritis present.    2. Left wrist pain    - CBC and Differential; Future  - Uric acid; Future  - XR Wrist Left G/E 3 Views; Future  - Uric acid  - CBC and Differential    3. Primary osteoarthritis of left wrist    - predniSONE (DELTASONE) 20 MG tablet; Take 3 tabs by mouth daily x 3 days, then 2 tabs daily x 3 days, then 1 tab daily x 3 days, then 1/2 tab daily x 3 days.  Dispense: 20 tablet; Refill: 0    With the uric acid elevated right favor gout initially.  Start the patient on prednisone.  Restart allopurinol.  Advised patient follow-up in 1 week and bring all his medications with him so we can review.  He is quite sure what he is taking.  Abundio Abdi MD  [facility] AND [facility]

## 2021-04-06 ENCOUNTER — OFFICE VISIT (OUTPATIENT)
Dept: FAMILY MEDICINE | Facility: OTHER | Age: 76
End: 2021-04-06
Attending: FAMILY MEDICINE
Payer: COMMERCIAL

## 2021-04-06 VITALS
DIASTOLIC BLOOD PRESSURE: 76 MMHG | SYSTOLIC BLOOD PRESSURE: 148 MMHG | HEART RATE: 63 BPM | RESPIRATION RATE: 24 BRPM | OXYGEN SATURATION: 97 % | BODY MASS INDEX: 35.98 KG/M2 | WEIGHT: 261.6 LBS | TEMPERATURE: 97.3 F

## 2021-04-06 DIAGNOSIS — M54.50 LUMBAR BACK PAIN: ICD-10-CM

## 2021-04-06 DIAGNOSIS — M10.00 IDIOPATHIC GOUT, UNSPECIFIED CHRONICITY, UNSPECIFIED SITE: Primary | ICD-10-CM

## 2021-04-06 DIAGNOSIS — M54.2 NECK PAIN: ICD-10-CM

## 2021-04-06 PROCEDURE — 99214 OFFICE O/P EST MOD 30 MIN: CPT | Performed by: FAMILY MEDICINE

## 2021-04-06 PROCEDURE — G0463 HOSPITAL OUTPT CLINIC VISIT: HCPCS

## 2021-04-06 RX ORDER — MULTIVITAMIN WITH IRON
1 TABLET ORAL DAILY
COMMUNITY
End: 2024-03-14

## 2021-04-06 RX ORDER — ZINC GLUCONATE 50 MG
50 TABLET ORAL DAILY
COMMUNITY

## 2021-04-06 RX ORDER — HYDROCODONE BITARTRATE AND ACETAMINOPHEN 7.5; 325 MG/1; MG/1
1 TABLET ORAL EVERY 4 HOURS PRN
Qty: 60 TABLET | Refills: 0 | Status: SHIPPED | OUTPATIENT
Start: 2021-04-06 | End: 2021-10-27

## 2021-04-06 RX ORDER — MULTIVITAMIN WITH IRON
100 TABLET ORAL DAILY
COMMUNITY

## 2021-04-06 RX ORDER — ALLOPURINOL 300 MG/1
300 TABLET ORAL DAILY
Qty: 90 TABLET | Refills: 3 | Status: SHIPPED | OUTPATIENT
Start: 2021-04-06 | End: 2022-04-04

## 2021-04-06 ASSESSMENT — PATIENT HEALTH QUESTIONNAIRE - PHQ9
SUM OF ALL RESPONSES TO PHQ QUESTIONS 1-9: 0
5. POOR APPETITE OR OVEREATING: NOT AT ALL

## 2021-04-06 ASSESSMENT — ANXIETY QUESTIONNAIRES
6. BECOMING EASILY ANNOYED OR IRRITABLE: NOT AT ALL
5. BEING SO RESTLESS THAT IT IS HARD TO SIT STILL: NOT AT ALL
GAD7 TOTAL SCORE: 0
7. FEELING AFRAID AS IF SOMETHING AWFUL MIGHT HAPPEN: NOT AT ALL
3. WORRYING TOO MUCH ABOUT DIFFERENT THINGS: NOT AT ALL
2. NOT BEING ABLE TO STOP OR CONTROL WORRYING: NOT AT ALL
1. FEELING NERVOUS, ANXIOUS, OR ON EDGE: NOT AT ALL

## 2021-04-06 ASSESSMENT — PAIN SCALES - GENERAL: PAINLEVEL: NO PAIN (0)

## 2021-04-06 NOTE — NURSING NOTE
Patient here for follow up left arm pain, he has been wearing the brace and he has no pain and decreased swelling. Medication Reconciliation: complete.    Brigette Garnica LPN  4/6/2021 12:43 PM

## 2021-04-06 NOTE — PROGRESS NOTES
SUBJECTIVE:   Bryan Kearney is a 75 year old male who presents to clinic today for the following health issues: Follow-up breast:    Patient arrives here for follow-up wrist swelling.  He is noticing quite a bit of relief since starting the prednisone.  Also requests refill of his hydrocodone for the back pain.  He takes it on a rare basis.   reviewed showing prescription early January for 60 tablets.  Last visit he did get a uric acid which was quite elevated.  He did not know whether he was on allopurinol now.  Is likely his wrist swelling and pain was due to gout.  He brings with him all his medications and allopurinol is not 1 listed.  We will resume the allopurinol.  He has been on allopurinol in the past but for unknown reason had stopped it.        Patient Active Problem List    Diagnosis Date Noted     Chronic kidney disease, stage 3 01/25/2021     Priority: Medium     Morbid obesity (H) 11/09/2020     Priority: Medium     Stenosis of left carotid artery 11/05/2020     Priority: Medium     Systolic murmur 11/05/2020     Priority: Medium     Benign prostatic hyperplasia with urinary frequency 03/02/2020     Priority: Medium     Hip pain, left 02/11/2019     Priority: Medium     Acute cystitis 10/10/2018     Priority: Medium     Esophageal reflux 09/05/2018     Priority: Medium     Degenerative joint disease of cervical and lumbar spine 09/05/2018     Priority: Medium     Actinic keratosis 11/29/2015     Priority: Medium     Solar keratosis 05/29/2015     Priority: Medium     Colonoscopy refused 07/29/2013     Priority: Medium     Overview:   Discussed and refused 07/2013       Hypercholesterolemia 05/11/2011     Priority: Medium     Gout 04/14/2010     Priority: Medium     Benign neoplasm of colon 09/18/2009     Priority: Medium     Overview:   benign       Hearing loss 09/18/2009     Priority: Medium     HTN (hypertension) 12/24/2007     Priority: Medium     Overview:   IMO Update 10/11       Past  Medical History:   Diagnosis Date     Electrocution     H/o electrocution 13,800 volts     Essential (primary) hypertension     Hypertension     Gastro-esophageal reflux disease without esophagitis     G E R D        Review of Systems     OBJECTIVE:     BP (!) 148/76   Pulse 63   Temp 97.3  F (36.3  C)   Resp 24   Wt 118.7 kg (261 lb 9.6 oz)   SpO2 97%   BMI 35.98 kg/m    Body mass index is 35.98 kg/m .  Physical Exam  Constitutional:       Appearance: Normal appearance.   Musculoskeletal:      Comments: There is still decreased range of motion except.  Especially extension.  But the swelling is dramatically gone down along with pain.  To the left wrist   Neurological:      Mental Status: He is alert.         Diagnostic Test Results:  none     ASSESSMENT/PLAN:         1. Idiopathic gout, unspecified chronicity, unspecified site  Restart allopurinol.  Patient is advised that if his pain should start to resume will do another course of prednisone.  - allopurinol (ZYLOPRIM) 300 MG tablet; Take 1 tablet (300 mg) by mouth daily  Dispense: 90 tablet; Refill: 3    2. Lumbar back pain  Refill.   is appropriate.  - HYDROcodone-acetaminophen (NORCO) 7.5-325 MG per tablet; Take 1 tablet by mouth every 4 hours as needed for severe pain  Dispense: 60 tablet; Refill: 0    3. Neck pain  He uses it as needed and on a regular basis.  - HYDROcodone-acetaminophen (NORCO) 7.5-325 MG per tablet; Take 1 tablet by mouth every 4 hours as needed for severe pain  Dispense: 60 tablet; Refill: 0      Abundio Abdi MD  [facility] AND [facility]

## 2021-04-07 ASSESSMENT — ANXIETY QUESTIONNAIRES: GAD7 TOTAL SCORE: 0

## 2021-04-21 ENCOUNTER — IMMUNIZATION (OUTPATIENT)
Dept: FAMILY MEDICINE | Facility: OTHER | Age: 76
End: 2021-04-21
Attending: FAMILY MEDICINE
Payer: COMMERCIAL

## 2021-04-21 VITALS
SYSTOLIC BLOOD PRESSURE: 130 MMHG | HEART RATE: 56 BPM | RESPIRATION RATE: 20 BRPM | TEMPERATURE: 97.2 F | DIASTOLIC BLOOD PRESSURE: 82 MMHG | WEIGHT: 263 LBS | BODY MASS INDEX: 36.17 KG/M2 | OXYGEN SATURATION: 98 %

## 2021-04-21 DIAGNOSIS — M79.89 LEG SWELLING: Primary | ICD-10-CM

## 2021-04-21 LAB
ANION GAP SERPL CALCULATED.3IONS-SCNC: 7 MMOL/L (ref 3–14)
BUN SERPL-MCNC: 22 MG/DL (ref 7–25)
CALCIUM SERPL-MCNC: 9.3 MG/DL (ref 8.6–10.3)
CHLORIDE SERPL-SCNC: 104 MMOL/L (ref 98–107)
CO2 SERPL-SCNC: 28 MMOL/L (ref 21–31)
CREAT SERPL-MCNC: 1.28 MG/DL (ref 0.7–1.3)
GFR SERPL CREATININE-BSD FRML MDRD: 55 ML/MIN/{1.73_M2}
GLUCOSE SERPL-MCNC: 117 MG/DL (ref 70–105)
POTASSIUM SERPL-SCNC: 3.6 MMOL/L (ref 3.5–5.1)
SODIUM SERPL-SCNC: 139 MMOL/L (ref 134–144)

## 2021-04-21 PROCEDURE — G0463 HOSPITAL OUTPT CLINIC VISIT: HCPCS

## 2021-04-21 PROCEDURE — 99214 OFFICE O/P EST MOD 30 MIN: CPT | Performed by: FAMILY MEDICINE

## 2021-04-21 PROCEDURE — 36415 COLL VENOUS BLD VENIPUNCTURE: CPT | Mod: ZL | Performed by: FAMILY MEDICINE

## 2021-04-21 PROCEDURE — G0463 HOSPITAL OUTPT CLINIC VISIT: HCPCS | Mod: 25

## 2021-04-21 PROCEDURE — 80048 BASIC METABOLIC PNL TOTAL CA: CPT | Mod: ZL | Performed by: FAMILY MEDICINE

## 2021-04-21 PROCEDURE — 91300 PR COVID VAC PFIZER DIL RECON 30 MCG/0.3 ML IM: CPT

## 2021-04-21 RX ORDER — ALLOPURINOL 300 MG/1
300 TABLET ORAL DAILY
COMMUNITY
Start: 2021-04-06 | End: 2023-10-23

## 2021-04-21 NOTE — NURSING NOTE
Chief Complaint   Patient presents with     Recheck Medication     medication management        Medication Reconciliation: completed   Mony Virgen LPN  4/21/2021 3:07 PM

## 2021-04-21 NOTE — LETTER
April 22, 2021      Bryan Kearney  [address] [place] [address]        Dear ,    We are writing to inform you of your test results.    Your test results fall within the expected range(s) or remain unchanged from previous results.  Please continue with current treatment plan.    Resulted Orders   Basic Metabolic Panel   Result Value Ref Range    Sodium 139 134 - 144 mmol/L    Potassium 3.6 3.5 - 5.1 mmol/L    Chloride 104 98 - 107 mmol/L    Carbon Dioxide 28 21 - 31 mmol/L    Anion Gap 7 3 - 14 mmol/L    Glucose 117 (H) 70 - 105 mg/dL    Urea Nitrogen 22 7 - 25 mg/dL    Creatinine 1.28 0.70 - 1.30 mg/dL    GFR Estimate 55 (L) >60 mL/min/[1.73_m2]    GFR Estimate If Black 66 >60 mL/min/[1.73_m2]    Calcium 9.3 8.6 - 10.3 mg/dL       If you have any questions or concerns, please call the clinic at the number listed above.       Sincerely,      Abundio Abdi MD

## 2021-04-22 NOTE — PROGRESS NOTES
"  SUBJECTIVE:   Bryan Kearney is a 75 year old male who presents to clinic today for the following health issues: Follow-up medication    Patient arrives here for follow-up medication.  There is a little confusion about his allopurinol and his Indocin.  He is not quite sure whether he is taking the Indocin on a regular basis or the allopurinol.  He keeps talking about the \"green pill\".  He is on Indocin that he takes as needed.  Allopurinol that he takes every day.  His wrist has gotten better though.  He can move it more.  Less swelling.  He also has been having some leg swelling he denies any shortness of breath.  He has no history of congestive heart failure.  Patient is on amlodipine.  But has been on it chronically.        Patient Active Problem List    Diagnosis Date Noted     Chronic kidney disease, stage 3 01/25/2021     Priority: Medium     Morbid obesity (H) 11/09/2020     Priority: Medium     Stenosis of left carotid artery 11/05/2020     Priority: Medium     Systolic murmur 11/05/2020     Priority: Medium     Benign prostatic hyperplasia with urinary frequency 03/02/2020     Priority: Medium     Hip pain, left 02/11/2019     Priority: Medium     Acute cystitis 10/10/2018     Priority: Medium     Esophageal reflux 09/05/2018     Priority: Medium     Degenerative joint disease of cervical and lumbar spine 09/05/2018     Priority: Medium     Actinic keratosis 11/29/2015     Priority: Medium     Solar keratosis 05/29/2015     Priority: Medium     Colonoscopy refused 07/29/2013     Priority: Medium     Overview:   Discussed and refused 07/2013       Hypercholesterolemia 05/11/2011     Priority: Medium     Gout 04/14/2010     Priority: Medium     Benign neoplasm of colon 09/18/2009     Priority: Medium     Overview:   benign       Hearing loss 09/18/2009     Priority: Medium     HTN (hypertension) 12/24/2007     Priority: Medium     Overview:   IMO Update 10/11         Review of Systems     OBJECTIVE:     BP " 130/82 (BP Location: Left arm, Patient Position: Sitting, Cuff Size: Adult Large)   Pulse 56   Temp 97.2  F (36.2  C) (Tympanic)   Resp 20   Wt 119.3 kg (263 lb)   SpO2 98%   BMI 36.17 kg/m    Body mass index is 36.17 kg/m .  Physical Exam  Constitutional:       Appearance: Normal appearance.   Musculoskeletal:      Comments: He lacks a little flexion extension to his left wrist.  There is no redness or swelling present.  Patient does have 1+ swelling in a around the ankles bilateral   Neurological:      Mental Status: He is alert.         Diagnostic Test Results:  Results for orders placed or performed in visit on 04/21/21   Basic Metabolic Panel     Status: Abnormal   Result Value Ref Range    Sodium 139 134 - 144 mmol/L    Potassium 3.6 3.5 - 5.1 mmol/L    Chloride 104 98 - 107 mmol/L    Carbon Dioxide 28 21 - 31 mmol/L    Anion Gap 7 3 - 14 mmol/L    Glucose 117 (H) 70 - 105 mg/dL    Urea Nitrogen 22 7 - 25 mg/dL    Creatinine 1.28 0.70 - 1.30 mg/dL    GFR Estimate 55 (L) >60 mL/min/[1.73_m2]    GFR Estimate If Black 66 >60 mL/min/[1.73_m2]    Calcium 9.3 8.6 - 10.3 mg/dL         ASSESSMENT/PLAN:         1. Leg swelling  His GFR is satisfactory.  Possibly due to his hypertensive medication.  We will just follow for now.  Monitor the salt  - Basic Metabolic Panel; Future  - Basic Metabolic Panel    Gout.  Reemphasized he is to take his allopurinol once a day.  And Indocin only as needed.  He appears to understand this.      Abundio Abdi MD  [facility] AND hospitals

## 2021-05-01 DIAGNOSIS — E78.00 HYPERCHOLESTEROLEMIA: Primary | ICD-10-CM

## 2021-05-01 DIAGNOSIS — I10 ESSENTIAL HYPERTENSION: ICD-10-CM

## 2021-05-03 RX ORDER — ATORVASTATIN CALCIUM 20 MG/1
TABLET, FILM COATED ORAL
Qty: 90 TABLET | Refills: 3 | Status: SHIPPED | OUTPATIENT
Start: 2021-05-03 | End: 2022-05-23

## 2021-05-03 RX ORDER — AMLODIPINE BESYLATE 5 MG/1
TABLET ORAL
Qty: 90 TABLET | Refills: 3 | Status: SHIPPED | OUTPATIENT
Start: 2021-05-03 | End: 2021-10-27

## 2021-05-03 NOTE — TELEPHONE ENCOUNTER
[facility] sent Rx request for the following:      Requested Prescriptions   Pending Prescriptions Disp Refills   atorvastatin (LIPITOR) 20 MG tablet [Pharmacy Med Name: Atorvastatin Calcium 20 MG Oral Tablet (Lipitor)] 90 tablet 3    Sig: Take 1 Tab by mouth one time a day.   Last Prescription Date:   4/13/20  Last Fill Qty/Refills:         90, R-3  Routing refill request to provider for review/approval because:   Statins Protocol Failed - 5/3/2021  9:41 AM       Failed - LDL on file in past 12 months      amLODIPine (NORVASC) 5 MG tablet [Pharmacy Med Name: amLODIPine Besylate 5 MG Oral Tablet (Norvasc)] 90 tablet 3    Sig: Take 1 Tab by mouth one time a day.   Last Prescription Date:   4/13/20  Last Fill Qty/Refills:         90, R-3    Last Office Visit:              4/21/21   Future Office visit:           None    Unable to complete prescription refill per RN Medication Refill Policy. Frieda Hobbs RN .............. 5/3/2021  9:45 AM

## 2021-05-12 ENCOUNTER — IMMUNIZATION (OUTPATIENT)
Dept: FAMILY MEDICINE | Facility: OTHER | Age: 76
End: 2021-05-12
Attending: FAMILY MEDICINE
Payer: COMMERCIAL

## 2021-05-12 VITALS
RESPIRATION RATE: 16 BRPM | DIASTOLIC BLOOD PRESSURE: 68 MMHG | WEIGHT: 268.6 LBS | SYSTOLIC BLOOD PRESSURE: 132 MMHG | TEMPERATURE: 97.6 F | BODY MASS INDEX: 36.94 KG/M2 | OXYGEN SATURATION: 96 % | HEART RATE: 68 BPM

## 2021-05-12 DIAGNOSIS — H90.0 CONDUCTIVE HEARING LOSS, BILATERAL: Primary | ICD-10-CM

## 2021-05-12 PROCEDURE — 91300 PR COVID VAC PFIZER DIL RECON 30 MCG/0.3 ML IM: CPT

## 2021-05-12 PROCEDURE — G0463 HOSPITAL OUTPT CLINIC VISIT: HCPCS | Mod: 25

## 2021-05-12 PROCEDURE — 99213 OFFICE O/P EST LOW 20 MIN: CPT | Performed by: FAMILY MEDICINE

## 2021-05-12 PROCEDURE — G0463 HOSPITAL OUTPT CLINIC VISIT: HCPCS

## 2021-05-12 ASSESSMENT — PAIN SCALES - GENERAL: PAINLEVEL: NO PAIN (0)

## 2021-05-12 NOTE — NURSING NOTE
Patient here for ears being plugged and he can not hear. Medication Reconciliation: complete.    Brigette Garnica LPN  5/12/2021 3:02 PM

## 2021-05-13 NOTE — PROGRESS NOTES
SUBJECTIVE:   Bryan Kearney is a 75 year old male who presents to clinic today for the following health issues: Difficulty hearing    Patient arrives here for difficulty hearing.  Complains of plugged ears.  Wishing to have the wax removed.  His left ear is now gotten worse patient has had trouble with his right ear for some time        Patient Active Problem List    Diagnosis Date Noted     Chronic kidney disease, stage 3 01/25/2021     Priority: Medium     Morbid obesity (H) 11/09/2020     Priority: Medium     Stenosis of left carotid artery 11/05/2020     Priority: Medium     Systolic murmur 11/05/2020     Priority: Medium     Benign prostatic hyperplasia with urinary frequency 03/02/2020     Priority: Medium     Hip pain, left 02/11/2019     Priority: Medium     Acute cystitis 10/10/2018     Priority: Medium     Esophageal reflux 09/05/2018     Priority: Medium     Degenerative joint disease of cervical and lumbar spine 09/05/2018     Priority: Medium     Actinic keratosis 11/29/2015     Priority: Medium     Solar keratosis 05/29/2015     Priority: Medium     Colonoscopy refused 07/29/2013     Priority: Medium     Overview:   Discussed and refused 07/2013       Hypercholesterolemia 05/11/2011     Priority: Medium     Gout 04/14/2010     Priority: Medium     Benign neoplasm of colon 09/18/2009     Priority: Medium     Overview:   benign       Hearing loss 09/18/2009     Priority: Medium     HTN (hypertension) 12/24/2007     Priority: Medium     Overview:   IMO Update 10/11       Past Medical History:   Diagnosis Date     Electrocution     H/o electrocution 13,800 volts     Essential (primary) hypertension     Hypertension     Gastro-esophageal reflux disease without esophagitis     G E R D      No Known Allergies    Review of Systems     OBJECTIVE:     /68 (BP Location: Right arm)   Pulse 68   Temp 97.6  F (36.4  C)   Resp 16   Wt 121.8 kg (268 lb 9.6 oz)   SpO2 96%   BMI 36.94 kg/m    Body mass  index is 36.94 kg/m .  Physical Exam  Constitutional:       Appearance: Normal appearance.   HENT:      Right Ear: Tympanic membrane and ear canal normal.      Left Ear: Tympanic membrane and ear canal normal.      Ears:      Comments: No wax present  Neurological:      Mental Status: He is alert.         Diagnostic Test Results:  none     ASSESSMENT/PLAN:         1. Conductive hearing loss, bilateral  Audiology referral to ENT.  Exam unremarkable.  - AUDIOLOGY ADULT REFERRAL; Future  - OTOLARYNGOLOGY REFERRAL      Abundio Abdi MD  [facility]

## 2021-06-21 DIAGNOSIS — M10.9 ACUTE GOUT INVOLVING TOE OF LEFT FOOT, UNSPECIFIED CAUSE: Primary | ICD-10-CM

## 2021-06-22 ENCOUNTER — OFFICE VISIT (OUTPATIENT)
Dept: OTOLARYNGOLOGY | Facility: OTHER | Age: 76
End: 2021-06-22
Attending: OTOLARYNGOLOGY
Payer: COMMERCIAL

## 2021-06-22 DIAGNOSIS — H90.6 MIXED HEARING LOSS, BILATERAL: Primary | ICD-10-CM

## 2021-06-22 PROCEDURE — G0463 HOSPITAL OUTPT CLINIC VISIT: HCPCS

## 2021-06-22 NOTE — PROGRESS NOTES
document embedded image  Patient Name: Bryan Kearney    Address: [address] [place]     YOB: 1945    [place] [id number]    MR Number: [id number]    Phone: [phone]  PCP: Abundio Abdi MD            Appointment Date: 21   Visit Provider: Vijay Sanford MD    cc: Abundio Abdi MD; ~    ENT Progress Note  Visit Reasons: Hearing Evaluation    HPI  History of Present Illness  Chief complaint:  Hearing loss    History  The patient is a 75-year-old retired  who had had some occupational noise exposure in the past.  He is also exposed to an explosion on the job in  and did notice hearing loss in relation to this.  He has noted gradual increasing hearing difficulties worse in his right ear.  He has had no previous otologic surgery.  He denies chronic recurring ear infections.  He has had no history consistent with ototoxicity.  He is unaware of a family history of hearing loss.    Exam  The external auditory canals and TMs are clear bilaterally  Tympanograms show negative pressure peaks suggesting some low-grade eustachian tube dysfunction  Audiogram reveals bilateral sensorineural hearing loss that is severe with a 75 decibel speech reception threshold on the right compared to 60 decibels on the left.  His discrimination scores are in the 70s.    Allergies    No Known Allergies Allergy (Unknown, Uncoded 20 12:46)  Unknown    PFSH  PFSH:     Medical History (Updated 21 @ 11:35 by Vijay Sanford MD)    Diagnosis unknown  Carotid stenosis  S/P carotid endarterectomy  Hypertension  Hyperlipidemia  GERD  Chronic back pain     Surgical History (Reviewed 20 @ 13:28 by Debbie Harrington, Med Assist)    Diagnosis unknown  Lt carotidendartectomy   Rt carotidendartectomy      Family History (Reviewed 20 @ 13:28 by Debbie Harrington, Med Assist)  Father     Lymph node cancer  Mother   ,  98 yrs     No problems  noted.    Daughter  Alive and well  Diabetes mellitus     Social History (Reviewed 11/09/20 @ 13:28 by Debbie Harrington, Med Assist)  Social History Notes::  Notes takes daily      Smoking Status:  Current every day smoker tobacco type: cigarettes  how long ago did patient quit smoking:  Quit date 6/9/2018, started up again  second hand exposure:  Yes  details:  moderate   Alcohol: Points: 5, Interpretation: Positive  marital status:    marital status details:   [place]  current occupational status:  retired  Do you exercise regularly?:  Yes (stays active)  caffeine:  Yes Type: coffee       A&P  Assessment & Plan  (1) Mixed hearing loss, bilateral:        Status: Acute        Code(s):  H90.6 - Mixed conductive and sensorineural hearing loss, bilateral  Additional Plan Details  Plan Details: I would advise a formal hearing aid trial.  He will investigate whether not his insurance company covers hearing aids for him.  He is welcome to follow up if we can be of assistance.      Vijay Sanford MD    06/22/21 0809    <Electronically signed by Vijay Sanford MD> 06/24/21 1118

## 2021-06-23 RX ORDER — INDOMETHACIN 50 MG/1
CAPSULE ORAL
Qty: 30 CAPSULE | Refills: 3 | Status: SHIPPED | OUTPATIENT
Start: 2021-06-23 | End: 2021-10-27

## 2021-06-23 NOTE — TELEPHONE ENCOUNTER
[facility] sent Rx request for the following:      Requested Prescriptions   Pending Prescriptions Disp Refills   indomethacin (INDOCIN) 50 MG capsule [Pharmacy Med Name: Indomethacin 50 MG Oral Capsule (Indocin)] 30 capsule 3    Sig: Take 1 Capsule by mouth three times a day as needed with meals.   Last Prescription Date:   2/10/21  Last Fill Qty/Refills:         30, R-3  Last Office Visit:              5/12/21   Future Office visit:           None  Routing refill request to provider for review/approval because:   Gout Agents Protocol Failed - 6/23/2021 12:44 PM       Failed - ALT on file in past 12 months       Failed - Has Uric Acid on file in past 12 months and value is less than 6   NSAID Medications Failed - 6/23/2021 12:44 PM       Failed - Normal ALT on file in past 12 months       Failed - Normal AST on file in past 12 months       Failed - Patient is age 6-64 years       Failed - Normal CBC on file in past 12 months     Unable to complete prescription refill per RN Medication Refill Policy. Frieda Hobbs RN .............. 6/23/2021  1:33 PM

## 2021-07-15 ENCOUNTER — HOSPITAL ENCOUNTER (OUTPATIENT)
Dept: GENERAL RADIOLOGY | Facility: OTHER | Age: 76
End: 2021-07-15
Attending: PHYSICIAN ASSISTANT
Payer: COMMERCIAL

## 2021-07-15 ENCOUNTER — OFFICE VISIT (OUTPATIENT)
Dept: FAMILY MEDICINE | Facility: OTHER | Age: 76
End: 2021-07-15
Attending: PHYSICIAN ASSISTANT
Payer: COMMERCIAL

## 2021-07-15 VITALS
HEIGHT: 71 IN | HEART RATE: 58 BPM | WEIGHT: 266.8 LBS | TEMPERATURE: 96.8 F | SYSTOLIC BLOOD PRESSURE: 136 MMHG | BODY MASS INDEX: 37.35 KG/M2 | OXYGEN SATURATION: 96 % | DIASTOLIC BLOOD PRESSURE: 78 MMHG | RESPIRATION RATE: 14 BRPM

## 2021-07-15 DIAGNOSIS — M54.16 LUMBAR RADICULOPATHY: ICD-10-CM

## 2021-07-15 DIAGNOSIS — M54.16 LUMBAR RADICULOPATHY: Primary | ICD-10-CM

## 2021-07-15 PROCEDURE — 72100 X-RAY EXAM L-S SPINE 2/3 VWS: CPT

## 2021-07-15 PROCEDURE — G0463 HOSPITAL OUTPT CLINIC VISIT: HCPCS

## 2021-07-15 PROCEDURE — 99213 OFFICE O/P EST LOW 20 MIN: CPT | Performed by: PHYSICIAN ASSISTANT

## 2021-07-15 PROCEDURE — G0463 HOSPITAL OUTPT CLINIC VISIT: HCPCS | Mod: 25

## 2021-07-15 ASSESSMENT — ANXIETY QUESTIONNAIRES
1. FEELING NERVOUS, ANXIOUS, OR ON EDGE: NOT AT ALL
7. FEELING AFRAID AS IF SOMETHING AWFUL MIGHT HAPPEN: NOT AT ALL
5. BEING SO RESTLESS THAT IT IS HARD TO SIT STILL: NOT AT ALL
6. BECOMING EASILY ANNOYED OR IRRITABLE: NOT AT ALL
2. NOT BEING ABLE TO STOP OR CONTROL WORRYING: NOT AT ALL
3. WORRYING TOO MUCH ABOUT DIFFERENT THINGS: NOT AT ALL
GAD7 TOTAL SCORE: 0
IF YOU CHECKED OFF ANY PROBLEMS ON THIS QUESTIONNAIRE, HOW DIFFICULT HAVE THESE PROBLEMS MADE IT FOR YOU TO DO YOUR WORK, TAKE CARE OF THINGS AT HOME, OR GET ALONG WITH OTHER PEOPLE: NOT DIFFICULT AT ALL

## 2021-07-15 ASSESSMENT — PATIENT HEALTH QUESTIONNAIRE - PHQ9: 5. POOR APPETITE OR OVEREATING: NOT AT ALL

## 2021-07-15 ASSESSMENT — MIFFLIN-ST. JEOR: SCORE: 1949.39

## 2021-07-15 ASSESSMENT — PAIN SCALES - GENERAL: PAINLEVEL: MODERATE PAIN (5)

## 2021-07-15 NOTE — PROGRESS NOTES
Assessment & Plan     1. Lumbar radiculopathy  X-ray showing severe degenerative changes throughout lumbar spine.  Symptoms, x-ray, exam most consistent with lumbar radiculopathy.  Given bilateral thigh pains have been progressively worsening patient would like to proceed forward with an MRI for additional evaluation.  Order placed.  In the meantime encourage symptomatic management with alternating scheduled Tylenol and ibuprofen, icing, stretching, activity as tolerated.  Offered physical therapy or chiropractic referral but patient declines at this time.  Given warning signs symptoms for need to follow-up for additional evaluation.  - XR Lumbar Spine 2/3 Views; Future  - MR Lumbar Spine w/o Contrast; Future      Return if symptoms worsen or fail to improve.    Rita Mcclure PA-C  [facility] AND [facility]    Maik Adams is a 75 year old who presents for the following health issues     HPI   Here for evaluation of leg pain.  Notes he has a sharp pain in his bilateral thighs that has been on and off for the past week.  Symptoms have been progressing.  Pain originally started in right thigh region but now it alternates between right and left.  He notes for quite some time he has had some on and off numbness in his feet.  He does state he has a history of low back arthritis.  Minimal associated low back pain at this time.  No known injury.  Has been taking Advil as needed which does provide good relief.  Has not tried icing, stretching.  Pain is improved when he is up walking.  No associated weakness, saddle anesthesia, loss of bowel or bladder function.        PAST MEDICAL HISTORY:   Past Medical History:   Diagnosis Date     Electrocution     H/o electrocution 13,800 volts     Essential (primary) hypertension     Hypertension     Gastro-esophageal reflux disease without esophagitis     G E R D       PAST SURGICAL HISTORY:   Past Surgical History:   Procedure Laterality Date     APPENDECTOMY OPEN       No Comments Provided     ARTHROSCOPY SHOULDER ROTATOR CUFF REPAIR      No Comments Provided     COLONOSCOPY      2001,Next colonoscopy due in .     OTHER SURGICAL HISTORY      ,CAROTID ENDARDECTOMY,Left carotid endarterectomy secondary to high grade stenosis     OTHER SURGICAL HISTORY      ,CAROTID ENDARDECTOMY,Bilateral endarterectomy       FAMILY HISTORY:   Family History   Problem Relation Age of Onset     Cancer Father         Cancer,Lymphoma     Colon Cancer Brother         Cancer-colon       SOCIAL HISTORY:   Social History     Tobacco Use     Smoking status: Former Smoker     Packs/day: 1.00     Years: 25.00     Pack years: 25.00     Quit date: 2020     Years since quittin.3     Smokeless tobacco: Never Used   Substance Use Topics     Alcohol use: Not Currently     Alcohol/week: 1.0 standard drinks     Comment: Rare      No Known Allergies  Current Outpatient Medications   Medication     albuterol (PROAIR HFA/PROVENTIL HFA/VENTOLIN HFA) 108 (90 Base) MCG/ACT inhaler     allopurinol (ZYLOPRIM) 300 MG tablet     allopurinol (ZYLOPRIM) 300 MG tablet     amLODIPine (NORVASC) 5 MG tablet     aspirin 325 MG tablet     atenolol (TENORMIN) 50 MG tablet     atorvastatin (LIPITOR) 20 MG tablet     Calcium Carbonate-Vitamin D (CALCIUM 500 + D) 500-125 MG-UNIT TABS     fluorouracil (EFUDEX) 5 % external cream     hydrochlorothiazide (HYDRODIURIL) 25 MG tablet     HYDROcodone-acetaminophen (NORCO) 7.5-325 MG per tablet     indomethacin (INDOCIN) 50 MG capsule     magnesium 250 MG tablet     Multiple Vitamins-Minerals (MULTIVITAMIN GUMMIES ADULT PO)     predniSONE (DELTASONE) 20 MG tablet     tamsulosin (FLOMAX) 0.4 MG capsule     vitamin B6 (PYRIDOXINE) 100 MG tablet     zinc gluconate 50 MG tablet     No current facility-administered medications for this visit.         Review of Systems   Per HPI        Objective    /78   Pulse 58   Temp 96.8  F (36  C)   Resp 14   Ht 1.791 m  "(5' 10.5\")   Wt 121 kg (266 lb 12.8 oz)   SpO2 96%   BMI 37.74 kg/m    Body mass index is 37.74 kg/m .  Physical Exam   General: Pleasant, in no apparent distress.  Musculoskeletal: Back is straight, no tenderness to palpation of paraspinal and paravertebral muscles. Full ROM of back, neck, upper and lower extremities. Nonantalgic gait.   Neurologic Exam:  normal gross motor, tone coordination and no visible tremor.  Psych: Appropriate mood and affect.    Results for orders placed or performed during the hospital encounter of 07/15/21   XR Lumbar Spine 2/3 Views     Status: None    Narrative    PROCEDURE: XR LUMBAR SPINE 2-3 VIEWS 7/15/2021 10:35 AM    HISTORY: Lumbar radiculopathy    COMPARISONS: None.    TECHNIQUE: AP and lateral    FINDINGS: There are degenerative osteophytes seen throughout the  lumbar spine. There is lumbar scoliosis concave right. Advanced lower  lumbar facet joint degenerative changes are noted. Heavy  atherosclerotic calcifications are seen in the abdominal aorta. Sacrum  and sacroiliac joints appear normal.         Impression    IMPRESSION: Severe degenerative changes of the lumbar spine    ANAHI DRISCOLL MD         SYSTEM ID:  [id number]             "

## 2021-07-15 NOTE — NURSING NOTE
Patient presents to clinic with right leg pain that goes up to groin area for the past week.  Lila Carias LPN ....................  7/15/2021   10:02 AM

## 2021-07-16 ASSESSMENT — ANXIETY QUESTIONNAIRES: GAD7 TOTAL SCORE: 0

## 2021-07-21 ENCOUNTER — TELEPHONE (OUTPATIENT)
Dept: FAMILY MEDICINE | Facility: OTHER | Age: 76
End: 2021-07-21

## 2021-07-21 DIAGNOSIS — F40.240 CLAUSTROPHOBIA: Primary | ICD-10-CM

## 2021-07-21 NOTE — TELEPHONE ENCOUNTER
Patient is having an MRI next week on 7/28 and is claustrophobic and would like medication for before.    Please send to [facility] to wait for [facility] to be back in clinic    Thank you

## 2021-07-26 RX ORDER — LORAZEPAM 0.5 MG/1
TABLET ORAL
Qty: 2 TABLET | Refills: 0 | Status: SHIPPED | OUTPATIENT
Start: 2021-07-26 | End: 2022-11-03

## 2021-07-27 DIAGNOSIS — I10 ESSENTIAL HYPERTENSION: ICD-10-CM

## 2021-07-27 RX ORDER — ATENOLOL 50 MG/1
TABLET ORAL
Qty: 90 TABLET | Refills: 1 | Status: SHIPPED | OUTPATIENT
Start: 2021-07-27 | End: 2022-01-26

## 2021-07-27 NOTE — TELEPHONE ENCOUNTER
"[facility] sent Rx request for the following:   atenolol (TENORMIN) 50 MG tablet  Sig Take 1 Tab by mouth one time a day.    Last Prescription Date:   10/27/2020  Last Fill Qty/Refills:         90, R-2    Last Office Visit:              07/15/2021 (Ren)   Future Office visit:           None noted   Beta-Blockers Protocol Passed - 7/27/2021  9:20 AM        Passed - Blood pressure under 140/90 in past 12 months     BP Readings from Last 3 Encounters:   07/15/21 136/78   05/12/21 132/68   04/21/21 130/82                 Passed - Patient is age 6 or older        Passed - Recent (12 mo) or future (30 days) visit within the authorizing provider's specialty     Patient has had an office visit with the authorizing provider or a provider within the authorizing providers department within the previous 12 mos or has a future within next 30 days. See \"Patient Info\" tab in inbasket, or \"Choose Columns\" in Meds & Orders section of the refill encounter.              Passed - Medication is active on med list         Prescription approved per [facility] Refill Protocol.  Mckenna Ray RN ....................  7/27/2021   11:23 AM      "

## 2021-07-28 ENCOUNTER — HOSPITAL ENCOUNTER (OUTPATIENT)
Dept: MRI IMAGING | Facility: OTHER | Age: 76
Discharge: HOME | End: 2021-07-28
Attending: PHYSICIAN ASSISTANT | Admitting: PHYSICIAN ASSISTANT
Payer: COMMERCIAL

## 2021-07-28 DIAGNOSIS — M54.16 LUMBAR RADICULOPATHY: ICD-10-CM

## 2021-07-28 PROCEDURE — 72148 MRI LUMBAR SPINE W/O DYE: CPT

## 2021-07-29 ENCOUNTER — TELEPHONE (OUTPATIENT)
Dept: FAMILY MEDICINE | Facility: OTHER | Age: 76
End: 2021-07-29

## 2021-07-30 ENCOUNTER — TELEPHONE (OUTPATIENT)
Dept: FAMILY MEDICINE | Facility: OTHER | Age: 76
End: 2021-07-30

## 2021-07-30 DIAGNOSIS — M51.369 DDD (DEGENERATIVE DISC DISEASE), LUMBAR: Primary | ICD-10-CM

## 2021-07-30 DIAGNOSIS — M47.816 FACET DEGENERATION OF LUMBAR REGION: ICD-10-CM

## 2021-07-30 DIAGNOSIS — M43.10 ANTEROLISTHESIS: ICD-10-CM

## 2021-07-30 DIAGNOSIS — M54.16 LUMBAR RADICULOPATHY: ICD-10-CM

## 2021-07-30 NOTE — TELEPHONE ENCOUNTER
Referral to [facility] for spine specialist review of patient's recent symptoms and MRI findings per his request.     Rita Mcclure PA-C on 7/30/2021 at 8:41 AM

## 2021-08-17 ENCOUNTER — TRANSFERRED RECORDS (OUTPATIENT)
Dept: HEALTH INFORMATION MANAGEMENT | Facility: OTHER | Age: 76
End: 2021-08-17

## 2021-09-03 ENCOUNTER — HOSPITAL ENCOUNTER (OUTPATIENT)
Dept: GENERAL RADIOLOGY | Facility: OTHER | Age: 76
Discharge: HOME | End: 2021-09-03
Attending: PHYSICIAN ASSISTANT | Admitting: PHYSICIAN ASSISTANT
Payer: COMMERCIAL

## 2021-09-03 DIAGNOSIS — M48.061 SPINAL STENOSIS OF LUMBAR REGION WITHOUT NEUROGENIC CLAUDICATION: ICD-10-CM

## 2021-09-03 DIAGNOSIS — M54.16 LUMBAR RADICULOPATHY: ICD-10-CM

## 2021-09-03 PROCEDURE — 64483 NJX AA&/STRD TFRM EPI L/S 1: CPT

## 2021-09-03 PROCEDURE — 250N000009 HC RX 250: Performed by: RADIOLOGY

## 2021-09-03 PROCEDURE — 250N000011 HC RX IP 250 OP 636: Performed by: RADIOLOGY

## 2021-09-03 PROCEDURE — 255N000002 HC RX 255 OP 636: Performed by: RADIOLOGY

## 2021-09-03 RX ORDER — LIDOCAINE HYDROCHLORIDE 10 MG/ML
2 INJECTION, SOLUTION INFILTRATION; PERINEURAL ONCE
Status: COMPLETED | OUTPATIENT
Start: 2021-09-03 | End: 2021-09-03

## 2021-09-03 RX ORDER — DEXAMETHASONE SODIUM PHOSPHATE 10 MG/ML
10 INJECTION, SOLUTION INTRAMUSCULAR; INTRAVENOUS ONCE
Status: COMPLETED | OUTPATIENT
Start: 2021-09-03 | End: 2021-09-03

## 2021-09-03 RX ORDER — LIDOCAINE HYDROCHLORIDE 10 MG/ML
2 INJECTION, SOLUTION EPIDURAL; INFILTRATION; INTRACAUDAL; PERINEURAL ONCE
Status: COMPLETED | OUTPATIENT
Start: 2021-09-03 | End: 2021-09-03

## 2021-09-03 RX ADMIN — LIDOCAINE HYDROCHLORIDE 2 ML: 10 INJECTION, SOLUTION INFILTRATION; PERINEURAL at 14:40

## 2021-09-03 RX ADMIN — LIDOCAINE HYDROCHLORIDE 2 ML: 10 INJECTION, SOLUTION EPIDURAL; INFILTRATION; INTRACAUDAL; PERINEURAL at 14:40

## 2021-09-03 RX ADMIN — IOHEXOL 2 ML: 240 INJECTION, SOLUTION INTRATHECAL; INTRAVASCULAR; INTRAVENOUS; ORAL at 14:40

## 2021-09-03 RX ADMIN — DEXAMETHASONE SODIUM PHOSPHATE 10 MG: 10 INJECTION, SOLUTION INTRAMUSCULAR; INTRAVENOUS at 14:40

## 2021-10-27 ENCOUNTER — OFFICE VISIT (OUTPATIENT)
Dept: FAMILY MEDICINE | Facility: OTHER | Age: 76
End: 2021-10-27
Attending: FAMILY MEDICINE
Payer: COMMERCIAL

## 2021-10-27 VITALS
RESPIRATION RATE: 24 BRPM | OXYGEN SATURATION: 98 % | TEMPERATURE: 97.1 F | SYSTOLIC BLOOD PRESSURE: 150 MMHG | HEIGHT: 71 IN | DIASTOLIC BLOOD PRESSURE: 90 MMHG | WEIGHT: 270.4 LBS | BODY MASS INDEX: 37.85 KG/M2 | HEART RATE: 65 BPM

## 2021-10-27 DIAGNOSIS — I10 PRIMARY HYPERTENSION: Primary | ICD-10-CM

## 2021-10-27 DIAGNOSIS — M10.9 ACUTE GOUT INVOLVING TOE OF LEFT FOOT, UNSPECIFIED CAUSE: ICD-10-CM

## 2021-10-27 DIAGNOSIS — M54.50 LUMBAR BACK PAIN: ICD-10-CM

## 2021-10-27 DIAGNOSIS — Z23 ENCOUNTER FOR IMMUNIZATION: ICD-10-CM

## 2021-10-27 DIAGNOSIS — M54.2 NECK PAIN: ICD-10-CM

## 2021-10-27 PROCEDURE — G0463 HOSPITAL OUTPT CLINIC VISIT: HCPCS | Performed by: FAMILY MEDICINE

## 2021-10-27 PROCEDURE — G0008 ADMIN INFLUENZA VIRUS VAC: HCPCS

## 2021-10-27 PROCEDURE — 99214 OFFICE O/P EST MOD 30 MIN: CPT | Performed by: FAMILY MEDICINE

## 2021-10-27 PROCEDURE — 90662 IIV NO PRSV INCREASED AG IM: CPT

## 2021-10-27 PROCEDURE — G0463 HOSPITAL OUTPT CLINIC VISIT: HCPCS | Mod: 25 | Performed by: FAMILY MEDICINE

## 2021-10-27 RX ORDER — AMLODIPINE BESYLATE 10 MG/1
10 TABLET ORAL DAILY
Qty: 90 TABLET | Refills: 3 | Status: SHIPPED | OUTPATIENT
Start: 2021-10-27 | End: 2022-11-03

## 2021-10-27 RX ORDER — HYDROCODONE BITARTRATE AND ACETAMINOPHEN 7.5; 325 MG/1; MG/1
1 TABLET ORAL EVERY 4 HOURS PRN
Qty: 60 TABLET | Refills: 0 | Status: SHIPPED | OUTPATIENT
Start: 2021-10-27 | End: 2022-07-05

## 2021-10-27 RX ORDER — INDOMETHACIN 50 MG/1
CAPSULE ORAL
Qty: 30 CAPSULE | Refills: 3 | Status: SHIPPED | OUTPATIENT
Start: 2021-10-27 | End: 2022-11-29

## 2021-10-27 ASSESSMENT — MIFFLIN-ST. JEOR: SCORE: 1960.72

## 2021-10-27 ASSESSMENT — PAIN SCALES - GENERAL: PAINLEVEL: NO PAIN (0)

## 2021-10-27 NOTE — PROGRESS NOTES
SUBJECTIVE:   Bryan Kearney is a 76 year old male who presents to clinic today for the following health issues: Medication refill    Patient arrives here for medication refill.  He is requesting a prescription for hydrocodone.   reviewed   07/26/2021  Lorazepam 0.5 MG Tablet    2.00  1 Na Edgar   [id number]   Ess (9021)   0/0  1.00 LME  [place]   04/06/2021  1   04/06/2021  Hydrocodone-Acetamin 7.5-325    60.00  10 Mi Lie   [id number]   Ess (9021)   0/0  45.00 MME  [place]  01/21/2021  1   01/21/2021  Hydrocodone-Acetamin 7.5-325    60.00  10 Mi Lie   [id number]   Ess (9021)   0/0  45.00 [place]  12/02/2020  1   12/02/2020  Hydrocodone-Acetamin 5-325 MG    30.00  8 De Web   [id number]   Wal (5150)   0/0  18.75 MME  [facility]   [place]  11/23/2020  1   11/23/2020  Hydrocodone-Acetamin 5-325 MG    25.00  5 De Web   [id number]   Wal (5150)   0/0  25.00 MME    Uses sparingly.  Last prescription was in April.  History of degenerative joint disease of the cervical and lumbar spine.  States that he typically will use it only after being outside and working fairly heavy.  Also requesting a refill of his gout pill.  He recently did have an episode and is down to a couple pills.             Patient Active Problem List    Diagnosis Date Noted     Chronic kidney disease, stage 3 (H) 01/25/2021     Priority: Medium     Morbid obesity (H) 11/09/2020     Priority: Medium     Stenosis of left carotid artery 11/05/2020     Priority: Medium     Systolic murmur 11/05/2020     Priority: Medium     Benign prostatic hyperplasia with urinary frequency 03/02/2020     Priority: Medium     Hip pain, left 02/11/2019     Priority: Medium     Acute cystitis 10/10/2018     Priority: Medium     Esophageal reflux 09/05/2018     Priority: Medium     Degenerative joint disease of cervical and lumbar spine 09/05/2018     Priority: Medium     Actinic keratosis 11/29/2015     Priority: Medium     Solar keratosis 05/29/2015     Priority:  "Medium     Colonoscopy refused 07/29/2013     Priority: Medium     Overview:   Discussed and refused 07/2013       Hypercholesterolemia 05/11/2011     Priority: Medium     Gout 04/14/2010     Priority: Medium     Benign neoplasm of colon 09/18/2009     Priority: Medium     Overview:   benign       Hearing loss 09/18/2009     Priority: Medium     HTN (hypertension) 12/24/2007     Priority: Medium     Overview:   IMO Update 10/11         Review of Systems     OBJECTIVE:     BP (!) 150/90 (BP Location: Right arm)   Pulse 65   Temp 97.1  F (36.2  C)   Resp 24   Ht 1.791 m (5' 10.5\")   Wt 122.7 kg (270 lb 6.4 oz)   SpO2 98%   BMI 38.25 kg/m    Body mass index is 38.25 kg/m .  Physical Exam    Diagnostic Test Results:  none     ASSESSMENT/PLAN:         (I10) Primary hypertension  (primary encounter diagnosis)  Comment: Currently not under good control.  Increase Norvasc to 10  Plan: amLODIPine (NORVASC) 10 MG tablet, FLU SHOT 65+        (FLUZONE HD)            (M10.9) Acute gout involving toe of left foot, unspecified cause  Comment: RefillPlan: indomethacin (INDOCIN) 50 MG capsule            (M54.50) Lumbar back pain  Comment: Refill  Plan: HYDROcodone-acetaminophen (NORCO) 7.5-325 MG         per tablet            (M54.2) Neck pain  Comment: Refill  Plan: HYDROcodone-acetaminophen (NORCO) 7.5-325 MG         per tablet            (Z23) Encounter for immunization   Comment: Updated  Plan: FLU SHOT 65+ (FLUZONE HD)                Abundio Abdi MD  [facility] AND [facility]  "

## 2021-10-27 NOTE — NURSING NOTE
Patient here for gout in the a=right ankle he took the gout medication and the pain and the swelling are better. The injections he had in his back have relieved the pain.Medication Reconciliation: complete.    Brigette Garnica LPN  10/27/2021 9:51 AM

## 2021-11-22 ENCOUNTER — IMMUNIZATION (OUTPATIENT)
Dept: FAMILY MEDICINE | Facility: OTHER | Age: 76
End: 2021-11-22
Attending: FAMILY MEDICINE
Payer: COMMERCIAL

## 2021-11-22 PROCEDURE — 91300 PR COVID VAC PFIZER DIL RECON 30 MCG/0.3 ML IM: CPT

## 2021-12-27 DIAGNOSIS — N40.1 BENIGN PROSTATIC HYPERPLASIA WITH URINARY FREQUENCY: ICD-10-CM

## 2021-12-27 DIAGNOSIS — R35.0 BENIGN PROSTATIC HYPERPLASIA WITH URINARY FREQUENCY: ICD-10-CM

## 2021-12-27 NOTE — TELEPHONE ENCOUNTER
[facility] sent Rx request for the following:      Requested Prescriptions   Pending Prescriptions Disp Refills     tamsulosin (FLOMAX) 0.4 MG capsule [Pharmacy Med Name: Tamsulosin HCl 0.4 MG Oral Capsule (Flomax)] 90 capsule 2     Sig: Take 1 Capsule by mouth one time a day.   Last Prescription Date:   3/22/21  Last Fill Qty/Refills:         90, R-2  Last Office Visit:              10/27/21   Future Office visit:           None  Routing refill request to provider for review/approval because:    Alpha Blockers Failed - 12/27/2021  4:10 PM        Failed - Blood pressure under 140/90 in past 12 months     BP Readings from Last 3 Encounters:   10/27/21 (!) 150/90   07/15/21 136/78   05/12/21 132/68        Unable to complete prescription refill per RN Medication Refill Policy. Frieda Hobbs RN .............. 12/27/2021  4:11 PM

## 2021-12-28 RX ORDER — TAMSULOSIN HYDROCHLORIDE 0.4 MG/1
CAPSULE ORAL
Qty: 90 CAPSULE | Refills: 2 | Status: SHIPPED | OUTPATIENT
Start: 2021-12-28 | End: 2022-10-18

## 2022-01-03 DIAGNOSIS — I10 ESSENTIAL HYPERTENSION: ICD-10-CM

## 2022-01-05 RX ORDER — HYDROCHLOROTHIAZIDE 25 MG/1
TABLET ORAL
Qty: 90 TABLET | Refills: 1 | Status: SHIPPED | OUTPATIENT
Start: 2022-01-05 | End: 2022-07-05

## 2022-01-05 NOTE — TELEPHONE ENCOUNTER
[facility] sent Rx request for the following:      Requested Prescriptions   Pending Prescriptions Disp Refills     hydrochlorothiazide (HYDRODIURIL) 25 MG tablet [Pharmacy Med Name: hydroCHLOROthiazide 25 MG Oral Tablet (Hydrodiuril)] 90 tablet 3     Sig: TAKE ONE TABLET ORALLY ONCE A DAY   Last Prescription Date:   1/13/21  Last Fill Qty/Refills:         90, R-3  Last Office Visit:              10/27/21   Future Office visit:           None  Routing refill request to provider for review/approval because:    Diuretics (Including Combos) Protocol Failed - 1/5/2022 12:41 PM        Failed - Blood pressure under 140/90 in past 12 months     BP Readings from Last 3 Encounters:   10/27/21 (!) 150/90   07/15/21 136/78   05/12/21 132/68        Unable to complete prescription refill per RN Medication Refill Policy. Frieda Hobbs RN .............. 1/5/2022  12:43 PM

## 2022-01-10 NOTE — TELEPHONE ENCOUNTER
Patient scheduled for BP check tomorrow. Brigette Garnica LPN .......................1/10/2022  3:57 PM

## 2022-01-11 ENCOUNTER — OFFICE VISIT (OUTPATIENT)
Dept: FAMILY MEDICINE | Facility: OTHER | Age: 77
End: 2022-01-11
Attending: FAMILY MEDICINE
Payer: COMMERCIAL

## 2022-01-11 VITALS
TEMPERATURE: 97.1 F | OXYGEN SATURATION: 97 % | BODY MASS INDEX: 38.42 KG/M2 | RESPIRATION RATE: 16 BRPM | SYSTOLIC BLOOD PRESSURE: 124 MMHG | HEART RATE: 62 BPM | WEIGHT: 271.6 LBS | DIASTOLIC BLOOD PRESSURE: 66 MMHG

## 2022-01-11 DIAGNOSIS — I10 PRIMARY HYPERTENSION: Primary | ICD-10-CM

## 2022-01-11 DIAGNOSIS — M48.061 SPINAL STENOSIS OF LUMBAR REGION WITHOUT NEUROGENIC CLAUDICATION: ICD-10-CM

## 2022-01-11 PROCEDURE — G0463 HOSPITAL OUTPT CLINIC VISIT: HCPCS | Performed by: FAMILY MEDICINE

## 2022-01-11 PROCEDURE — 99214 OFFICE O/P EST MOD 30 MIN: CPT | Performed by: FAMILY MEDICINE

## 2022-01-11 ASSESSMENT — PATIENT HEALTH QUESTIONNAIRE - PHQ9: SUM OF ALL RESPONSES TO PHQ QUESTIONS 1-9: 0

## 2022-01-11 ASSESSMENT — PAIN SCALES - GENERAL: PAINLEVEL: SEVERE PAIN (7)

## 2022-01-11 NOTE — PROGRESS NOTES
SUBJECTIVE:   Bryan Kearney is a 76 year old male who presents to clinic today for the following health issues: Blood pressure check.  Follow-up back pain.  Side pain    Patient arrives here for follow-up back pain.  Back in August he did have a transforaminal steroid injection at L4-L5.  States he got pretty good relief.  He was supposed to do home exercise programs but did not make any appointments.  Reports that recently the pain has been getting worse.  No incontinence of bowel or bladder.  No pain radiating down the legs.  No leg weakness.  He is also arrives here for blood pressure check he is tolerating his blood pressure medications well.  He had side pain.  He states that he is concerned about lung cancer or stomach cancer.  He denies any changes in eating habits.  No cough.  No weight loss.  No early satiety.  Pain is mainly located on the lateral portions of his abdomen.    History of Present Illness       Back Pain:  He presents for follow up of back pain. Patient's back pain is a chronic problem.  Location of back pain:  Left lower back  Description of back pain: burning and cramping  Back pain spreads: right buttocks and right thigh    Since patient first noticed back pain, pain is: gradually worsening  Does back pain interfere with his job:  Yes      He eats 0-1 servings of fruits and vegetables daily.He exercises with enough effort to increase his heart rate 9 or less minutes per day.    He is taking medications regularly.        Patient Active Problem List    Diagnosis Date Noted     Spinal stenosis of lumbar region without neurogenic claudication 01/11/2022     Priority: Medium     Chronic kidney disease, stage 3 (H) 01/25/2021     Priority: Medium     Morbid obesity (H) 11/09/2020     Priority: Medium     Stenosis of left carotid artery 11/05/2020     Priority: Medium     Systolic murmur 11/05/2020     Priority: Medium     Benign prostatic hyperplasia with urinary frequency 03/02/2020      Priority: Medium     Hip pain, left 02/11/2019     Priority: Medium     Acute cystitis 10/10/2018     Priority: Medium     Esophageal reflux 09/05/2018     Priority: Medium     Degenerative joint disease of cervical and lumbar spine 09/05/2018     Priority: Medium     Actinic keratosis 11/29/2015     Priority: Medium     Solar keratosis 05/29/2015     Priority: Medium     Colonoscopy refused 07/29/2013     Priority: Medium     Overview:   Discussed and refused 07/2013       Hypercholesterolemia 05/11/2011     Priority: Medium     Gout 04/14/2010     Priority: Medium     Benign neoplasm of colon 09/18/2009     Priority: Medium     Overview:   benign       Hearing loss 09/18/2009     Priority: Medium     HTN (hypertension) 12/24/2007     Priority: Medium     Overview:   IMO Update 10/11       Past Medical History:   Diagnosis Date     Electrocution     H/o electrocution 13,800 volts     Essential (primary) hypertension     Hypertension     Gastro-esophageal reflux disease without esophagitis     G E R D      No Known Allergies    Review of Systems     OBJECTIVE:     /66   Pulse 62   Temp 97.1  F (36.2  C)   Resp 16   Wt 123.2 kg (271 lb 9.6 oz)   SpO2 97%   BMI 38.42 kg/m    Body mass index is 38.42 kg/m .  Physical Exam  Constitutional:       Appearance: Normal appearance. He is obese.   HENT:      Head: Normocephalic.   Cardiovascular:      Rate and Rhythm: Normal rate and regular rhythm.   Abdominal:      General: Abdomen is flat. There is no distension.      Palpations: There is no mass.      Tenderness: There is no abdominal tenderness.   Musculoskeletal:      Comments: Pain is reproduced on the lumbar musculature   Neurological:      Mental Status: He is alert.      Comments: Gait was normal         Diagnostic Test Results:  none     ASSESSMENT/PLAN:         (I10) Primary hypertension  (primary encounter diagnosis)  Comment: Currently under good control  Plan:     (M48.061) Spinal stenosis of lumbar  region without neurogenic claudication  Comment: ProceedPlan: Physical Therapy Referral    Site pain.  Reassurance is given without any other symptoms that would suggest stomach cancer lung cancer we will just monitor for now.                Abundio Abdi MD  [facility] AND [facility]

## 2022-01-24 ENCOUNTER — TRANSFERRED RECORDS (OUTPATIENT)
Dept: HEALTH INFORMATION MANAGEMENT | Facility: OTHER | Age: 77
End: 2022-01-24
Payer: COMMERCIAL

## 2022-01-24 DIAGNOSIS — I10 ESSENTIAL HYPERTENSION: ICD-10-CM

## 2022-01-26 RX ORDER — ATENOLOL 50 MG/1
TABLET ORAL
Qty: 90 TABLET | Refills: 3 | Status: SHIPPED | OUTPATIENT
Start: 2022-01-26 | End: 2022-11-03

## 2022-01-26 NOTE — TELEPHONE ENCOUNTER
[facility] sent Rx request for the following:      Requested Prescriptions   Pending Prescriptions Disp Refills     atenolol (TENORMIN) 50 MG tablet [Pharmacy Med Name: Atenolol 50 MG Oral Tablet (Tenormin)] 90 tablet 1     Sig: Take 1 Tab by mouth one time a day.   Last Prescription Date:   7/27/21  Last Fill Qty/Refills:         90, R-1  Last Office Visit:              1/11/22   Future Office visit:           None    Unable to complete prescription refill per RN Medication Refill Policy. Frieda Hobbs RN .............. 1/26/2022  8:34 AM

## 2022-02-02 ENCOUNTER — HOSPITAL ENCOUNTER (OUTPATIENT)
Dept: PHYSICAL THERAPY | Facility: OTHER | Age: 77
Setting detail: THERAPIES SERIES
End: 2022-02-02
Attending: FAMILY MEDICINE
Payer: COMMERCIAL

## 2022-02-02 DIAGNOSIS — M48.061 SPINAL STENOSIS OF LUMBAR REGION WITHOUT NEUROGENIC CLAUDICATION: ICD-10-CM

## 2022-02-02 PROCEDURE — 97161 PT EVAL LOW COMPLEX 20 MIN: CPT | Mod: GP

## 2022-02-02 PROCEDURE — 97110 THERAPEUTIC EXERCISES: CPT | Mod: GP

## 2022-02-02 NOTE — PROGRESS NOTES
"   02/02/22 0030   General Information   Type of Visit Initial OP Ortho PT Evaluation   Start of Care Date 02/02/22   Referring Physician Dr. Abundio Abdi   Patient/Family Goals Statement to have less pain and be able to do more activities with better tolerance    Orders Evaluate and Treat   Date of Order 01/11/22   Certification Required? Yes   Medical Diagnosis Spinal stenosis of lumbar region without neurogenic claudication   Surgical/Medical history reviewed Yes   Precautions/Limitations no known precautions/limitations   Special Instructions Patient is [place], use of pocket talker helpful. Utilized for eval today.    General Information Comments PMH: cardiac hx   Body Part(s)   Body Part(s) Lumbar Spine/SI   Presentation and Etiology   Pertinent history of current problem (include personal factors and/or comorbidities that impact the POC) Patient states that he has long standing history of back pain from \"old age\" that has increased in severity in last 2-3 years. He did see a back specialist in [facility] and injections were performed. These did not provide long term relief and at this time patient is not planning to have any more. He may pursue accupuncture instead. Patient saw primary care provider and PT was recommneded to help with motion and strength. Patient is agreeable to give it a try. Symptoms are the highest severity in the morning and as he gets moving things loosen up and feel much better. Notes that he does often experience pain going into both legs all the way down to the toes. This is increased with any bending or lifting activities and also when he is performing outdoor prolonged ambulation in woods. Patient's household chores include chopping wood and his recreational hobbies are hunting and fishing both of which require ambulation over uneven surfaces and prolonged sitting. Patient sleeps in bed, but does note sleep well. Some of the sleep issues are from pain and others are from challenges " with sleep unrelated to pain. Patient has not tried ice or heat for symptoms. Sitting for short periods of time is position of comfort. Prolonged sitting increases stiffness and pain when he comes to stand. Most aggravating activities include bending, carrying, walking over even and uneven surfaces, prolonged sitting. Patient has pain medications prescribed and these are helpful for pain reduction.   Impairments A. Pain;D. Decreased ROM;E. Decreased flexibility;F. Decreased strength and endurance;G. Impaired balance;H. Impaired gait   Functional Limitations perform activities of daily living;perform desired leisure / sports activities   Symptom Location Back on both sides into hips and both legs    How/Where did it occur From Degenerative Joint Disease   Onset date of current episode/exacerbation   (longstanding, increased in severity in last 2-3 years)   Chronicity Chronic   Pain rating (0-10 point scale) Best (/10);Worst (/10)   Best (/10) 0   Worst (/10) 10   Pain quality A. Sharp;C. Aching;F. Stabbing   Frequency of pain/symptoms C. With activity   Pain/symptoms are: Worse in the morning   Pain/symptoms exacerbated by A. Sitting;B. Walking;I. Bending  (carrying)   Pain/symptoms eased by A. Sitting  (prescription medication)   Current / Previous Interventions   Diagnostic Tests: MRI   MRI Results Results   MRI results 1. Grade 1 anterolisthesis of L4 on L5 secondary to facet degenerative change. 2. Multilevel degenerative disc disease with mass effect on the ventral thecal sac throughout the lumbar spine. 3. Mild mass effect on the medial right L4 nerve root ganglion due to primarily to facet degenerative change.   Prior Level of Function   Prior Level of Function-Mobility High level function, but having increaed difficulty with tolerance during and after outdoor and household tasks.    Current Level of Function   Patient role/employment history F. Retired   Fall Risk Screen   Have you fallen 2 or more times in  the past year? Yes   Have you fallen and had an injury in the past year? No   Timed Up and Go score (seconds) NT   Is patient a fall risk? Yes   Fall screen comments Will require assist on and off of equipment and gait belt for any high level balance challenges. Most of patient's falls were in woods when deer hunting.    Lumbar Spine/SI Objective Findings   Posture Forward flexed at waist   Gait/Locomotion Antalgic, slow saurabh and stride length. Not using AD.    Balance/Proprioception (Single Leg Stance) Balance deficits present. Unable to perform single leg stance without hand support   Flexion ROM Major limitation in motion due to tension in lumbar segments and HS tension   Extension ROM Moderate limitation in motion limited by pain in LB   Right Side Bending ROM Minor limitation in motion, no pain   Left Side Bending ROM Minor limitation in motion, no pain    Lumbar ROM Comment Segmental hypomobility and muscle tension present.    Hip Screen Limited motion both due to capsular tension and muscle tension present bilaterally.    Hip Flexion (L2) Strength 4 to 4-   Hip Abduction Strength 5   Hip Adduction Strength 5   Hip Extension Strength 5   Knee Flexion Strength 4+/5   Knee Extension (L3) Strength 5   Ankle Dorsiflexion (L4) Strength 5   Ankle Plantar Flexion (S1) Strength 4+    Lumbar/Hip/Knee/Foot Strength Comments Weakness present in hip flexors bilaterally, HS with L LE weakness greater than R and bilateral PF. Patient able to heel walk and toe walk with hand support for balance. Functional squat screen demonstrates stiffness in bilateral hips.    Hamstring Flexibility High level of tension present bilaterally.    Hip Flexor Flexibility Moderate tension present bilaterally.    Piriformis Flexibility Moderate tension present bilaterally   SLR Positive bilaterally.    Segmental Mobility Segmental hypomobility present throughout lumbar spine with L3, L4, L5 most limited in mobility.    Palpation Tension  throughout QL bilaterally and piriformis bilateral.    Planned Therapy Interventions   Planned Therapy Interventions balance training;manual therapy;ROM;strengthening;stretching   Planned Therapy Interventions Comment Ther Ex and Manual tehcniques to improve mobility, strength and functional balance. Patient Education, Home Program Development. Back Health Concepts.    Planned Modality Interventions   Planned Modality Interventions Cryotherapy;Hot packs   Planned Modality Interventions Comments as needed in adjunct with manual tehcniques   Clinical Impression   Criteria for Skilled Therapeutic Interventions Met yes, treatment indicated   PT Diagnosis Patient presents with back and LE pain, limited mobility, weakness and functional balance deficits.    Influenced by the following impairments Muscle tension, functional weakness and balance deficits, segmental hypomobility, deconditioning   Functional limitations due to impairments Pain with bending, lifting, carrying. pain with functional mobility, outdoor ambulation, household tasks and recreational activities.    Clinical Presentation Stable/Uncomplicated   Clinical Presentation Rationale clinical judgement   Clinical Decision Making (Complexity) Low complexity   Therapy Frequency 2 times/Week   Predicted Duration of Therapy Intervention (days/wks) 90 days   Risk & Benefits of therapy have been explained Yes   Patient, Family & other staff in agreement with plan of care Yes   Clinical Impression Comments Patient presents with long standing history of back pain and LE radicular pain that has been increasing in last 2-3 years. Patient has muscle tension in back and LEs, positive SLR and segmental hypomobility in lumbar segments. Patient is a good candidate for PT services with use of therapuetic exercise and manual tehcniques to restore mobility, improve tissue health and increase strength. Will also focus on functional balance, aerobic conditioning, back health  concepts, HEP development and patient education. Patient is [place], pocket talker used today.    Education Assessment   Preferred Learning Style Pictures/video;Demonstration   Barriers to Learning Hearing   ORTHO GOALS   PT Ortho Eval Goals 1;2;3;4   Ortho Goal 1   Goal Identifier STG 1   Goal Description Patient will be independent and compliant with HEP.    Target Date 03/02/22   Ortho Goal 2   Goal Identifier LTG 1 - Sitting and Transfers   Goal Description Patient will be able to consistently perform sit to stand transfers and tolerate prolonged sitting with appropriate lumbar support as needed without limitation from pain.    Target Date 05/03/22   Ortho Goal 3   Goal Identifier LTG 2- Gait even and uneven surfaces   Goal Description Patient will report 50% or more improvement in tolerance of gait over even and uneven surfaces outdoors during household chores and yard tasks.    Target Date 05/03/22   Ortho Goal 4   Goal Identifier LTG 3 - Bending, Lifting, Carrying   Goal Description Patient will report 50% or greater symptoms improvement with daily tasks requiring bending, lifting and carrying in home and community.    Target Date 05/03/22   Total Evaluation Time   PT Eval, Low Complexity Minutes (93068) 25   Therapy Certification   Certification date from 02/02/22   Certification date to 05/03/22   Medical Diagnosis Spinal stenosis of lumbar region without neurogenic claudication

## 2022-02-02 NOTE — PROGRESS NOTES
[facility]    OUTPATIENT PHYSICAL THERAPY ORTHOPEDIC EVALUATION  PLAN OF TREATMENT FOR OUTPATIENT REHABILITATION  (COMPLETE FOR INITIAL CLAIMS ONLY)  Patient's Last Name, First Name, M.I.  YOB: 1945  Bryan Kearney    Provider s Name:  [facility]   Medical Record No.  [id number]   Start of Care Date:  02/02/22   Onset Date:   (longstanding, increased in severity in last 2-3 years)   Type:     _X__PT   ___OT   ___SLP Medical Diagnosis:  (P) Spinal stenosis of lumbar region without neurogenic claudication     PT Diagnosis:  Patient presents with back and LE pain, limited mobility, weakness and functional balance deficits.    Visits from SOC:  1      _________________________________________________________________________________  Plan of Treatment/Functional Goals:  balance training,manual therapy,ROM,strengthening,stretching  Ther Ex and Manual tehcniques to improve mobility, strength and functional balance. Patient Education, Home Program Development. Back Health Concepts.   Cryotherapy,Hot packs  as needed in adjunct with manual tehcniques  Goals  Goal Identifier: (P) STG 1  Goal Description: (P) Patient will be independent and compliant with HEP.   Target Date: (P) 03/02/22    Goal Identifier: (P) LTG 1 - Sitting and Transfers  Goal Description: (P) Patient will be able to consistently perform sit to stand transfers and tolerate prolonged sitting with appropriate lumbar support as needed without limitation from pain.   Target Date: (P) 05/03/22    Goal Identifier: (P) LTG 2- Gait even and uneven surfaces  Goal Description: (P) Patient will report 50% or more improvement in tolerance of gait over even and uneven surfaces outdoors during household chores and yard tasks.   Target Date: (P) 05/03/22    Goal Identifier: (P) LTG 3 - Bending, Lifting, Carrying  Goal  Description: (P) Patient will report 50% or greater symptoms improvement with daily tasks requiring bending, lifting and carrying in home and community.   Target Date: (P) 05/03/22                                                Therapy Frequency:  (P) 2 times/Week  Predicted Duration of Therapy Intervention:  (P) 90 days    Shawnee Haines, PT                 I CERTIFY THE NEED FOR THESE SERVICES FURNISHED UNDER        THIS PLAN OF TREATMENT AND WHILE UNDER MY CARE     (Physician co-signature of this document indicates review and certification of the therapy plan).                       Certification Date From:  (P) 02/02/22   Certification Date To:  (P) 05/03/22    Referring Provider:  Dr. Abundio Abdi    Initial Assessment        See Epic Evaluation Start of Care Date: 02/02/22

## 2022-02-03 ENCOUNTER — OFFICE VISIT (OUTPATIENT)
Dept: FAMILY MEDICINE | Facility: OTHER | Age: 77
End: 2022-02-03
Attending: FAMILY MEDICINE
Payer: COMMERCIAL

## 2022-02-03 ENCOUNTER — HOSPITAL ENCOUNTER (OUTPATIENT)
Dept: GENERAL RADIOLOGY | Facility: OTHER | Age: 77
End: 2022-02-03
Attending: FAMILY MEDICINE
Payer: COMMERCIAL

## 2022-02-03 VITALS
BODY MASS INDEX: 38.17 KG/M2 | DIASTOLIC BLOOD PRESSURE: 80 MMHG | TEMPERATURE: 98.6 F | HEART RATE: 74 BPM | RESPIRATION RATE: 16 BRPM | WEIGHT: 269.8 LBS | SYSTOLIC BLOOD PRESSURE: 138 MMHG | OXYGEN SATURATION: 97 %

## 2022-02-03 DIAGNOSIS — R05.9 COUGH: Primary | ICD-10-CM

## 2022-02-03 DIAGNOSIS — R05.9 COUGH: ICD-10-CM

## 2022-02-03 DIAGNOSIS — R10.11 RUQ ABDOMINAL PAIN: ICD-10-CM

## 2022-02-03 LAB
ALBUMIN SERPL-MCNC: 4.3 G/DL (ref 3.5–5.7)
ALP SERPL-CCNC: 63 U/L (ref 34–104)
ALT SERPL W P-5'-P-CCNC: 28 U/L (ref 7–52)
AST SERPL W P-5'-P-CCNC: 22 U/L (ref 13–39)
BASOPHILS # BLD AUTO: 0.1 10E3/UL (ref 0–0.2)
BASOPHILS NFR BLD AUTO: 1 %
BILIRUB DIRECT SERPL-MCNC: 0.2 MG/DL (ref 0–0.2)
BILIRUB SERPL-MCNC: 0.6 MG/DL (ref 0.3–1)
EOSINOPHIL # BLD AUTO: 0.6 10E3/UL (ref 0–0.7)
EOSINOPHIL NFR BLD AUTO: 9 %
ERYTHROCYTE [DISTWIDTH] IN BLOOD BY AUTOMATED COUNT: 13.2 % (ref 10–15)
HCT VFR BLD AUTO: 46.8 % (ref 40–53)
HGB BLD-MCNC: 16 G/DL (ref 13.3–17.7)
IMM GRANULOCYTES # BLD: 0 10E3/UL
IMM GRANULOCYTES NFR BLD: 0 %
LYMPHOCYTES # BLD AUTO: 1.8 10E3/UL (ref 0.8–5.3)
LYMPHOCYTES NFR BLD AUTO: 27 %
MCH RBC QN AUTO: 31.3 PG (ref 26.5–33)
MCHC RBC AUTO-ENTMCNC: 34.2 G/DL (ref 31.5–36.5)
MCV RBC AUTO: 92 FL (ref 78–100)
MONOCYTES # BLD AUTO: 1 10E3/UL (ref 0–1.3)
MONOCYTES NFR BLD AUTO: 15 %
NEUTROPHILS # BLD AUTO: 3.3 10E3/UL (ref 1.6–8.3)
NEUTROPHILS NFR BLD AUTO: 48 %
NRBC # BLD AUTO: 0 10E3/UL
NRBC BLD AUTO-RTO: 0 /100
PLATELET # BLD AUTO: 205 10E3/UL (ref 150–450)
PROT SERPL-MCNC: 7 G/DL (ref 6.4–8.9)
RBC # BLD AUTO: 5.11 10E6/UL (ref 4.4–5.9)
WBC # BLD AUTO: 6.8 10E3/UL (ref 4–11)

## 2022-02-03 PROCEDURE — 85041 AUTOMATED RBC COUNT: CPT | Mod: ZL | Performed by: FAMILY MEDICINE

## 2022-02-03 PROCEDURE — 85014 HEMATOCRIT: CPT | Mod: ZL | Performed by: FAMILY MEDICINE

## 2022-02-03 PROCEDURE — 36415 COLL VENOUS BLD VENIPUNCTURE: CPT | Mod: ZL | Performed by: FAMILY MEDICINE

## 2022-02-03 PROCEDURE — 71046 X-RAY EXAM CHEST 2 VIEWS: CPT

## 2022-02-03 PROCEDURE — 99214 OFFICE O/P EST MOD 30 MIN: CPT | Performed by: FAMILY MEDICINE

## 2022-02-03 PROCEDURE — 80076 HEPATIC FUNCTION PANEL: CPT | Mod: ZL | Performed by: FAMILY MEDICINE

## 2022-02-03 PROCEDURE — G0463 HOSPITAL OUTPT CLINIC VISIT: HCPCS | Mod: 25

## 2022-02-03 PROCEDURE — G0463 HOSPITAL OUTPT CLINIC VISIT: HCPCS

## 2022-02-03 RX ORDER — CODEINE PHOSPHATE AND GUAIFENESIN 10; 100 MG/5ML; MG/5ML
1-2 SOLUTION ORAL EVERY 4 HOURS PRN
Qty: 180 ML | Refills: 1 | Status: SHIPPED | OUTPATIENT
Start: 2022-02-03 | End: 2023-07-27

## 2022-02-03 ASSESSMENT — PAIN SCALES - GENERAL: PAINLEVEL: MODERATE PAIN (5)

## 2022-02-03 NOTE — LETTER
February 7, 2022      Bryan Kearney  [id number]  [place] [address]        Dear ,    We are writing to inform you of your test results.    Your test results fall within the expected range(s) or remain unchanged from previous results.  Please continue with current treatment plan.    Resulted Orders   Hepatic Function Panel   Result Value Ref Range    Bilirubin Total 0.6 0.3 - 1.0 mg/dL    Bilirubin Direct 0.2 0.0 - 0.2 mg/dL    Protein Total 7.0 6.4 - 8.9 g/dL    Albumin 4.3 3.5 - 5.7 g/dL    Alkaline Phosphatase 63 34 - 104 U/L    AST 22 13 - 39 U/L    ALT 28 7 - 52 U/L   CBC with platelets and differential   Result Value Ref Range    WBC Count 6.8 4.0 - 11.0 10e3/uL    RBC Count 5.11 4.40 - 5.90 10e6/uL    Hemoglobin 16.0 13.3 - 17.7 g/dL    Hematocrit 46.8 40.0 - 53.0 %    MCV 92 78 - 100 fL    MCH 31.3 26.5 - 33.0 pg    MCHC 34.2 31.5 - 36.5 g/dL    RDW 13.2 10.0 - 15.0 %    Platelet Count 205 150 - 450 10e3/uL    % Neutrophils 48 %    % Lymphocytes 27 %    % Monocytes 15 %    % Eosinophils 9 %    % Basophils 1 %    % Immature Granulocytes 0 %    NRBCs per 100 WBC 0 <1 /100    Absolute Neutrophils 3.3 1.6 - 8.3 10e3/uL    Absolute Lymphocytes 1.8 0.8 - 5.3 10e3/uL    Absolute Monocytes 1.0 0.0 - 1.3 10e3/uL    Absolute Eosinophils 0.6 0.0 - 0.7 10e3/uL    Absolute Basophils 0.1 0.0 - 0.2 10e3/uL    Absolute Immature Granulocytes 0.0 <=0.4 10e3/uL    Absolute NRBCs 0.0 10e3/uL       If you have any questions or concerns, please call the clinic at the number listed above.       Sincerely,      Abundio Abdi MD

## 2022-02-03 NOTE — NURSING NOTE
Patient here for cough and right side pain the last 2 days. He started therapy for his back on Tuesday. Medication Reconciliation: complete.    Brigette Garnica LPN  2/3/2022 4:08 PM

## 2022-02-04 ENCOUNTER — HOSPITAL ENCOUNTER (OUTPATIENT)
Dept: PHYSICAL THERAPY | Facility: OTHER | Age: 77
Setting detail: THERAPIES SERIES
End: 2022-02-04
Attending: FAMILY MEDICINE
Payer: COMMERCIAL

## 2022-02-04 PROCEDURE — 97110 THERAPEUTIC EXERCISES: CPT | Mod: GP

## 2022-02-04 ASSESSMENT — ENCOUNTER SYMPTOMS: COUGH: 1

## 2022-02-04 NOTE — PROGRESS NOTES
SUBJECTIVE:   Bryan Kearney is a 76 year old male who presents to clinic today for the following health issues:  Cough right chest pain  Patient arrives here with cough and right chest pain.  More specifically he points to the right upper quadrant of his abdomen where the pain occurs.  Is also been coughing for 2 days which makes the pain in the right upper quadrant worse.  He is not taking any medication.  Cough is nonproductive.  Typically dry.  Usually worse in the morning.    Cough          Patient Active Problem List    Diagnosis Date Noted     Spinal stenosis of lumbar region without neurogenic claudication 01/11/2022     Priority: Medium     Chronic kidney disease, stage 3 (H) 01/25/2021     Priority: Medium     Morbid obesity (H) 11/09/2020     Priority: Medium     Stenosis of left carotid artery 11/05/2020     Priority: Medium     Systolic murmur 11/05/2020     Priority: Medium     Benign prostatic hyperplasia with urinary frequency 03/02/2020     Priority: Medium     Hip pain, left 02/11/2019     Priority: Medium     Acute cystitis 10/10/2018     Priority: Medium     Esophageal reflux 09/05/2018     Priority: Medium     Degenerative joint disease of cervical and lumbar spine 09/05/2018     Priority: Medium     Actinic keratosis 11/29/2015     Priority: Medium     Solar keratosis 05/29/2015     Priority: Medium     Colonoscopy refused 07/29/2013     Priority: Medium     Overview:   Discussed and refused 07/2013       Hypercholesterolemia 05/11/2011     Priority: Medium     Gout 04/14/2010     Priority: Medium     Benign neoplasm of colon 09/18/2009     Priority: Medium     Overview:   benign       Hearing loss 09/18/2009     Priority: Medium     HTN (hypertension) 12/24/2007     Priority: Medium     Overview:   IMO Update 10/11       Past Medical History:   Diagnosis Date     Electrocution     H/o electrocution 13,800 volts     Essential (primary) hypertension     Hypertension     Gastro-esophageal  reflux disease without esophagitis     G E R D      No Known Allergies    Review of Systems   Respiratory: Positive for cough.         OBJECTIVE:     /80   Pulse 74   Temp 98.6  F (37  C)   Resp 16   Wt 122.4 kg (269 lb 12.8 oz)   SpO2 97%   BMI 38.17 kg/m    Body mass index is 38.17 kg/m .  Physical Exam  Constitutional:       Appearance: Normal appearance.   Cardiovascular:      Rate and Rhythm: Normal rate and regular rhythm.   Pulmonary:      Effort: Pulmonary effort is normal.      Breath sounds: Normal breath sounds.   Abdominal:      General: There is no distension.      Tenderness: There is abdominal tenderness.      Comments: Patient does have tenderness in the right upper quadrant no masses palpated   Neurological:      Mental Status: He is alert.         Diagnostic Test Results:  Results for orders placed or performed during the hospital encounter of 02/03/22   XR Chest 2 Views     Status: None    Narrative    PROCEDURE:  XR CHEST 2 VW    HISTORY:  Cough.     COMPARISON:  2017    FINDINGS:   The cardiac silhouette is normal in size. The pulmonary vasculature is  normal.  The lungs are clear. No pleural effusion or pneumothorax.      Impression    IMPRESSION:  No acute cardiopulmonary disease.      ANAHI DRISCOLL MD         SYSTEM ID:  [id number]   Results for orders placed or performed in visit on 02/03/22   Hepatic Function Panel     Status: Normal   Result Value Ref Range    Bilirubin Total 0.6 0.3 - 1.0 mg/dL    Bilirubin Direct 0.2 0.0 - 0.2 mg/dL    Protein Total 7.0 6.4 - 8.9 g/dL    Albumin 4.3 3.5 - 5.7 g/dL    Alkaline Phosphatase 63 34 - 104 U/L    AST 22 13 - 39 U/L    ALT 28 7 - 52 U/L   CBC with platelets and differential     Status: None   Result Value Ref Range    WBC Count 6.8 4.0 - 11.0 10e3/uL    RBC Count 5.11 4.40 - 5.90 10e6/uL    Hemoglobin 16.0 13.3 - 17.7 g/dL    Hematocrit 46.8 40.0 - 53.0 %    MCV 92 78 - 100 fL    MCH 31.3 26.5 - 33.0 pg    MCHC 34.2 31.5 - 36.5  g/dL    RDW 13.2 10.0 - 15.0 %    Platelet Count 205 150 - 450 10e3/uL    % Neutrophils 48 %    % Lymphocytes 27 %    % Monocytes 15 %    % Eosinophils 9 %    % Basophils 1 %    % Immature Granulocytes 0 %    NRBCs per 100 WBC 0 <1 /100    Absolute Neutrophils 3.3 1.6 - 8.3 10e3/uL    Absolute Lymphocytes 1.8 0.8 - 5.3 10e3/uL    Absolute Monocytes 1.0 0.0 - 1.3 10e3/uL    Absolute Eosinophils 0.6 0.0 - 0.7 10e3/uL    Absolute Basophils 0.1 0.0 - 0.2 10e3/uL    Absolute Immature Granulocytes 0.0 <=0.4 10e3/uL    Absolute NRBCs 0.0 10e3/uL   CBC and Differential     Status: None    Narrative    The following orders were created for panel order CBC and Differential.  Procedure                               Abnormality         Status                     ---------                               -----------         ------                     CBC with platelets and d...[[id number]]                      Final result                 Please view results for these tests on the individual orders.       ASSESSMENT/PLAN:         (R05.9) Cough  (primary encounter diagnosis)  Comment:   Plan: XR Chest 2 Views, guaiFENesin-codeine         (ROBITUSSIN AC) 100-10 MG/5ML solution  Chest x-ray unremarkable.  Will start Robitussin    (R10.11) RUQ abdominal pain  Comment: Hepatic panel normal proceed with abdominal ultrasound  Plan: Hepatic Function Panel, CBC and Differential,         US Abdomen Complete               Abundio Abdi MD  [facility]

## 2022-02-10 ENCOUNTER — HOSPITAL ENCOUNTER (OUTPATIENT)
Dept: ULTRASOUND IMAGING | Facility: OTHER | Age: 77
Discharge: HOME | End: 2022-02-10
Attending: FAMILY MEDICINE | Admitting: FAMILY MEDICINE
Payer: COMMERCIAL

## 2022-02-10 DIAGNOSIS — R10.11 RUQ ABDOMINAL PAIN: ICD-10-CM

## 2022-02-10 PROCEDURE — 76700 US EXAM ABDOM COMPLETE: CPT

## 2022-02-11 ENCOUNTER — HOSPITAL ENCOUNTER (OUTPATIENT)
Dept: PHYSICAL THERAPY | Facility: OTHER | Age: 77
Setting detail: THERAPIES SERIES
End: 2022-02-11
Attending: FAMILY MEDICINE
Payer: COMMERCIAL

## 2022-02-11 PROCEDURE — 97110 THERAPEUTIC EXERCISES: CPT | Mod: GP,CQ

## 2022-02-14 ENCOUNTER — HOSPITAL ENCOUNTER (OUTPATIENT)
Dept: PHYSICAL THERAPY | Facility: OTHER | Age: 77
Setting detail: THERAPIES SERIES
End: 2022-02-14
Attending: FAMILY MEDICINE
Payer: COMMERCIAL

## 2022-02-14 PROCEDURE — 97110 THERAPEUTIC EXERCISES: CPT | Mod: GP

## 2022-02-16 ENCOUNTER — HOSPITAL ENCOUNTER (OUTPATIENT)
Dept: PHYSICAL THERAPY | Facility: OTHER | Age: 77
Setting detail: THERAPIES SERIES
End: 2022-02-16
Attending: FAMILY MEDICINE
Payer: COMMERCIAL

## 2022-02-16 PROCEDURE — 97110 THERAPEUTIC EXERCISES: CPT | Mod: GP

## 2022-02-22 ENCOUNTER — HOSPITAL ENCOUNTER (OUTPATIENT)
Dept: PHYSICAL THERAPY | Facility: OTHER | Age: 77
Setting detail: THERAPIES SERIES
End: 2022-02-22
Attending: FAMILY MEDICINE
Payer: COMMERCIAL

## 2022-02-22 PROCEDURE — 97140 MANUAL THERAPY 1/> REGIONS: CPT | Mod: GP

## 2022-02-22 PROCEDURE — 97110 THERAPEUTIC EXERCISES: CPT | Mod: GP

## 2022-02-24 ENCOUNTER — HOSPITAL ENCOUNTER (OUTPATIENT)
Dept: PHYSICAL THERAPY | Facility: OTHER | Age: 77
Setting detail: THERAPIES SERIES
End: 2022-02-24
Attending: FAMILY MEDICINE
Payer: COMMERCIAL

## 2022-02-24 PROCEDURE — 97110 THERAPEUTIC EXERCISES: CPT | Mod: GP,CQ

## 2022-02-24 PROCEDURE — 97140 MANUAL THERAPY 1/> REGIONS: CPT | Mod: GP,CQ

## 2022-02-28 ENCOUNTER — HOSPITAL ENCOUNTER (OUTPATIENT)
Dept: PHYSICAL THERAPY | Facility: OTHER | Age: 77
Setting detail: THERAPIES SERIES
End: 2022-02-28
Attending: FAMILY MEDICINE
Payer: COMMERCIAL

## 2022-02-28 PROCEDURE — 97110 THERAPEUTIC EXERCISES: CPT | Mod: GP

## 2022-03-03 ENCOUNTER — HOSPITAL ENCOUNTER (OUTPATIENT)
Dept: PHYSICAL THERAPY | Facility: OTHER | Age: 77
Setting detail: THERAPIES SERIES
End: 2022-03-03
Attending: FAMILY MEDICINE
Payer: COMMERCIAL

## 2022-03-03 PROCEDURE — 97110 THERAPEUTIC EXERCISES: CPT | Mod: GP

## 2022-03-07 ENCOUNTER — HOSPITAL ENCOUNTER (OUTPATIENT)
Dept: PHYSICAL THERAPY | Facility: OTHER | Age: 77
Setting detail: THERAPIES SERIES
End: 2022-03-07
Attending: FAMILY MEDICINE
Payer: COMMERCIAL

## 2022-03-07 PROCEDURE — 97140 MANUAL THERAPY 1/> REGIONS: CPT | Mod: GP

## 2022-03-07 PROCEDURE — 97110 THERAPEUTIC EXERCISES: CPT | Mod: GP

## 2022-03-07 NOTE — PROGRESS NOTES
[facility]    Outpatient Physical Therapy Progress Note  Patient: Bryan Kearney  : 1945    Beginning/End Dates of Reporting Period:  22 to 3/7/22    Referring Provider: Dr. Abundio Abdi    Therapy Diagnosis: Spinal stenosis of lumbar region without neurogenic claudication     Client Self Report: Symptoms are present after activities like shoveling or sitting in fish house, but down since starting PT as a whole. Enjoys exercises.     Objective Measurements:  Improved HS length, L LE still less than WFL but much improved. Lumbar flexion minor limitation in motion, lumbar extension minor limitation with pain. R and L side glide moderate limitation in motion with pain.       Goals:  Goal Identifier STG 1   Goal Description Patient will be independent and compliant with HEP.    Target Date 22   Date Met   22   Progress (detail required for progress note):       Goal Identifier LTG 1 - Sitting and Transfers   Goal Description Patient will be able to consistently perform sit to stand transfers and tolerate prolonged sitting with appropriate lumbar support as needed without limitation from pain.    Target Date 22   Date Met   Goal Partially Met, some surfaces are going well. Others are more of a challenge such as ice house.    Progress (detail required for progress note):       Goal Identifier LTG 2- Gait even and uneven surfaces   Goal Description Patient will report 50% or more improvement in tolerance of gait over even and uneven surfaces outdoors during household chores and yard tasks.    Target Date 22   Date Met   Goal Partially Met, symptoms are improving not yet to 50%.   Progress (detail required for progress note):       Goal Identifier LTG 3 - Bending, Lifting, Carrying   Goal Description Patient will report 50% or greater symptoms improvement with daily tasks requiring  bending, lifting and carrying in home and community.    Target Date 05/03/22   Date Met   Goal Partially met, symptoms improved but not yet to 50% level    Progress (detail required for progress note):         Assessment: Improving muscle length in legs and mobility in thoracolumbar region. Patient also displaced increased exercise tolerance. PT services have focused on optimizing muscle length, mobility and strength. These improvements are transferring to improved tolerance and performance of daily activities in home and community. Continued PT services are likely to continue progress toward long term goals.     HEP: Added: seated HS stretch and single knee to chest - handout provided.  Hooklying Lumbar rotation; Supine March with TA engagement. Frequency 2-3x/day. wall push ups, standing marches.       Plan:  Continue therapy per current plan of care.    Discharge:  No

## 2022-03-10 ENCOUNTER — HOSPITAL ENCOUNTER (OUTPATIENT)
Dept: PHYSICAL THERAPY | Facility: OTHER | Age: 77
Setting detail: THERAPIES SERIES
Discharge: HOME | End: 2022-03-10
Attending: FAMILY MEDICINE
Payer: COMMERCIAL

## 2022-03-10 PROCEDURE — 97110 THERAPEUTIC EXERCISES: CPT | Mod: GP

## 2022-03-14 ENCOUNTER — HOSPITAL ENCOUNTER (OUTPATIENT)
Dept: PHYSICAL THERAPY | Facility: OTHER | Age: 77
Setting detail: THERAPIES SERIES
Discharge: HOME | End: 2022-03-14
Attending: FAMILY MEDICINE
Payer: COMMERCIAL

## 2022-03-14 PROCEDURE — 97140 MANUAL THERAPY 1/> REGIONS: CPT | Mod: GP

## 2022-03-14 PROCEDURE — 97110 THERAPEUTIC EXERCISES: CPT | Mod: GP

## 2022-03-17 ENCOUNTER — HOSPITAL ENCOUNTER (OUTPATIENT)
Dept: PHYSICAL THERAPY | Facility: OTHER | Age: 77
Setting detail: THERAPIES SERIES
Discharge: HOME | End: 2022-03-17
Attending: FAMILY MEDICINE
Payer: COMMERCIAL

## 2022-03-17 PROCEDURE — 97140 MANUAL THERAPY 1/> REGIONS: CPT | Mod: GP

## 2022-03-17 PROCEDURE — 97110 THERAPEUTIC EXERCISES: CPT | Mod: GP

## 2022-03-21 ENCOUNTER — HOSPITAL ENCOUNTER (OUTPATIENT)
Dept: PHYSICAL THERAPY | Facility: OTHER | Age: 77
Setting detail: THERAPIES SERIES
Discharge: HOME | End: 2022-03-21
Attending: FAMILY MEDICINE
Payer: COMMERCIAL

## 2022-03-21 PROCEDURE — 97140 MANUAL THERAPY 1/> REGIONS: CPT | Mod: GP,CQ

## 2022-03-21 PROCEDURE — 97110 THERAPEUTIC EXERCISES: CPT | Mod: GP,CQ

## 2022-03-24 ENCOUNTER — HOSPITAL ENCOUNTER (OUTPATIENT)
Dept: PHYSICAL THERAPY | Facility: OTHER | Age: 77
Setting detail: THERAPIES SERIES
Discharge: HOME | End: 2022-03-24
Attending: FAMILY MEDICINE
Payer: COMMERCIAL

## 2022-03-24 PROCEDURE — 97110 THERAPEUTIC EXERCISES: CPT | Mod: GP

## 2022-03-24 PROCEDURE — 97140 MANUAL THERAPY 1/> REGIONS: CPT | Mod: GP

## 2022-04-01 DIAGNOSIS — M10.00 IDIOPATHIC GOUT, UNSPECIFIED CHRONICITY, UNSPECIFIED SITE: Primary | ICD-10-CM

## 2022-04-04 RX ORDER — ALLOPURINOL 300 MG/1
TABLET ORAL
Qty: 90 TABLET | Refills: 2 | Status: SHIPPED | OUTPATIENT
Start: 2022-04-04 | End: 2023-01-09

## 2022-04-04 NOTE — TELEPHONE ENCOUNTER
[facility] sent Rx request for the following:      Requested Prescriptions   Pending Prescriptions Disp Refills     allopurinol (ZYLOPRIM) 300 MG tablet [Pharmacy Med Name: Allopurinol 300 MG Oral Tablet (Zyloprim)] 90 tablet 3     Sig: Take 1 Tablet by mouth one time a day.       Gout Agents Protocol Failed - 4/4/2022 10:54 AM        Failed - Has Uric Acid on file in past 12 months and value is less than 6     Recent Labs   Lab Test 03/30/21  0930   URIC 9.6*     If level is 6mg/dL or greater, ok to refill one time and refer to provider.      Last Prescription Date:   4/6/21  Last Fill Qty/Refills:         90, R-3 (Listed twice on active medication list)    Last Office Visit:              2/3/22   Future Office visit:           None    This encounter is also being routed to the Outreach Appointment Request pool to contact the patient upon provider approval of lab orders.     Frieda Hobbs RN .............. 4/4/2022  10:55 AM

## 2022-04-06 NOTE — TELEPHONE ENCOUNTER
Tried to contact patient to schedule annual visit. Phone just rings, no VM    Lore Falcon on 4/6/2022 at 2:22 PM

## 2022-04-26 NOTE — PROGRESS NOTES
[facility]    Outpatient Physical Therapy Discharge Note  Patient: Bryan Kearney  : 1945    Beginning/End Dates of Reporting Period:  22 to 3/24/22    Referring Provider: Dr. Abundio Abdi    Therapy Diagnosis: Spinal stenosis of lumbar region without neurogenic claudication     Client Self Report: Patient reports he is doing well. Symptoms are much improved. He feels ready to transition to independent HEP, but will come back if any setbacks occur.     Objective:   Functional lumbar mobility and large improvements in strength with ability to independent get on and off equipment and perform transfers from low surfaces without limitation.     Goals:  Goal Identifier STG 1   Goal Description Patient will be independent and compliant with HEP.    Target Date 22   Date Met   3/2/22   Progress (detail required for progress note):       Goal Identifier LTG 1 - Sitting and Transfers   Goal Description Patient will be able to consistently perform sit to stand transfers and tolerate prolonged sitting with appropriate lumbar support as needed without limitation from pain.    Target Date 22   Date Met   3/25/22   Progress (detail required for progress note):       Goal Identifier LTG 2- Gait even and uneven surfaces   Goal Description Patient will report 50% or more improvement in tolerance of gait over even and uneven surfaces outdoors during household chores and yard tasks.    Target Date 22   Date Met   3/25/22   Progress (detail required for progress note):       Goal Identifier LTG 3 - Bending, Lifting, Carrying   Goal Description Patient will report 50% or greater symptoms improvement with daily tasks requiring bending, lifting and carrying in home and community.    Target Date 22   Date Met   3/25/22   Progress (detail required for progress note):         Assessment: Transition to  independent HEP. Patient has met long term goals and HEP established. Patient encouraged to remain active and perform HEP and exercises to maintain strength and mobility gains. patient in agreement. He will return with any issues or setbacks.       Plan:  Discharge from therapy.    Discharge:    Reason for Discharge: Patient has met all goals.    Equipment Issued: HEP  seated HS stretch and single knee to chest - handout provided.  Hooklying Lumbar rotation; Supine March with TA engagement. Frequency 2-3x/day. wall push ups, standing marches.     Discharge Plan: Patient to continue home program.

## 2022-05-20 DIAGNOSIS — E78.00 HYPERCHOLESTEROLEMIA: ICD-10-CM

## 2022-05-23 RX ORDER — ATORVASTATIN CALCIUM 20 MG/1
TABLET, FILM COATED ORAL
Qty: 90 TABLET | Refills: 2 | Status: SHIPPED | OUTPATIENT
Start: 2022-05-23 | End: 2023-03-01

## 2022-05-23 NOTE — TELEPHONE ENCOUNTER
[facility] sent Rx request for the following:      Requested Prescriptions   Pending Prescriptions Disp Refills     atorvastatin (LIPITOR) 20 MG tablet [Pharmacy Med Name: Atorvastatin Calcium 20 MG Oral Tablet (Lipitor)] 90 tablet 3     Sig: Take 1 Tab by mouth one time a day.       Statins Protocol Failed - 5/23/2022  2:04 PM        Failed - LDL on file in past 12 months     Recent Labs   Lab Test 10/02/19  0715   LDL 95        Last Prescription Date:   5/3/21  Last Fill Qty/Refills:         90, R-3    Last Office Visit:              2/3/22  Future Office visit:           None    In clinical absence of patient's primary, Abundio Abdi, patient is requesting that this message be sent to the covering provider for consideration please.    Frieda Hobbs RN .............. 5/23/2022  2:07 PM

## 2022-07-05 ENCOUNTER — OFFICE VISIT (OUTPATIENT)
Dept: FAMILY MEDICINE | Facility: OTHER | Age: 77
End: 2022-07-05
Attending: FAMILY MEDICINE
Payer: COMMERCIAL

## 2022-07-05 VITALS
TEMPERATURE: 97.1 F | SYSTOLIC BLOOD PRESSURE: 124 MMHG | RESPIRATION RATE: 16 BRPM | HEIGHT: 71 IN | BODY MASS INDEX: 36.43 KG/M2 | DIASTOLIC BLOOD PRESSURE: 84 MMHG | OXYGEN SATURATION: 96 % | WEIGHT: 260.2 LBS | HEART RATE: 60 BPM

## 2022-07-05 DIAGNOSIS — Z00.00 WELLNESS EXAMINATION: Primary | ICD-10-CM

## 2022-07-05 DIAGNOSIS — E66.01 MORBID OBESITY (H): ICD-10-CM

## 2022-07-05 DIAGNOSIS — M54.2 NECK PAIN: ICD-10-CM

## 2022-07-05 DIAGNOSIS — N18.30 STAGE 3 CHRONIC KIDNEY DISEASE, UNSPECIFIED WHETHER STAGE 3A OR 3B CKD (H): ICD-10-CM

## 2022-07-05 DIAGNOSIS — I10 ESSENTIAL HYPERTENSION: ICD-10-CM

## 2022-07-05 DIAGNOSIS — Z13.220 SCREENING FOR HYPERLIPIDEMIA: ICD-10-CM

## 2022-07-05 DIAGNOSIS — M54.50 LUMBAR BACK PAIN: ICD-10-CM

## 2022-07-05 LAB
ANION GAP SERPL CALCULATED.3IONS-SCNC: 7 MMOL/L (ref 3–14)
BUN SERPL-MCNC: 25 MG/DL (ref 7–25)
CALCIUM SERPL-MCNC: 9.7 MG/DL (ref 8.6–10.3)
CHLORIDE BLD-SCNC: 103 MMOL/L (ref 98–107)
CHOLEST SERPL-MCNC: 130 MG/DL
CO2 SERPL-SCNC: 29 MMOL/L (ref 21–31)
CREAT SERPL-MCNC: 1.3 MG/DL (ref 0.7–1.3)
CREAT UR-MCNC: 127 MG/DL
FASTING STATUS PATIENT QL REPORTED: ABNORMAL
GFR SERPL CREATININE-BSD FRML MDRD: 56 ML/MIN/1.73M2
GLUCOSE BLD-MCNC: 115 MG/DL (ref 70–105)
HDLC SERPL-MCNC: 29 MG/DL (ref 23–92)
LDLC SERPL CALC-MCNC: 62 MG/DL
MICROALBUMIN UR-MCNC: 14.9 MG/L
MICROALBUMIN/CREAT UR: 11.73 MG/G CR (ref 0–17)
NONHDLC SERPL-MCNC: 101 MG/DL
POTASSIUM BLD-SCNC: 3.6 MMOL/L (ref 3.5–5.1)
SODIUM SERPL-SCNC: 139 MMOL/L (ref 134–144)
TRIGL SERPL-MCNC: 197 MG/DL

## 2022-07-05 PROCEDURE — 80061 LIPID PANEL: CPT | Mod: ZL | Performed by: FAMILY MEDICINE

## 2022-07-05 PROCEDURE — G0439 PPPS, SUBSEQ VISIT: HCPCS | Performed by: FAMILY MEDICINE

## 2022-07-05 PROCEDURE — 80048 BASIC METABOLIC PNL TOTAL CA: CPT | Mod: ZL | Performed by: FAMILY MEDICINE

## 2022-07-05 PROCEDURE — 82043 UR ALBUMIN QUANTITATIVE: CPT | Mod: ZL | Performed by: FAMILY MEDICINE

## 2022-07-05 PROCEDURE — 91305 COVID-19,PF,PFIZER (12+ YRS): CPT

## 2022-07-05 PROCEDURE — 36415 COLL VENOUS BLD VENIPUNCTURE: CPT | Mod: ZL | Performed by: FAMILY MEDICINE

## 2022-07-05 RX ORDER — HYDROCODONE BITARTRATE AND ACETAMINOPHEN 7.5; 325 MG/1; MG/1
1 TABLET ORAL EVERY 4 HOURS PRN
Qty: 60 TABLET | Refills: 0 | Status: SHIPPED | OUTPATIENT
Start: 2022-07-05 | End: 2022-11-03

## 2022-07-05 RX ORDER — HYDROCHLOROTHIAZIDE 25 MG/1
25 TABLET ORAL DAILY
Qty: 90 TABLET | Refills: 4 | Status: SHIPPED | OUTPATIENT
Start: 2022-07-05 | End: 2023-07-17

## 2022-07-05 ASSESSMENT — PAIN SCALES - GENERAL: PAINLEVEL: MODERATE PAIN (5)

## 2022-07-05 ASSESSMENT — ACTIVITIES OF DAILY LIVING (ADL): CURRENT_FUNCTION: NO ASSISTANCE NEEDED

## 2022-07-05 NOTE — PROGRESS NOTES
"SUBJECTIVE:   Bryan Kearney is a 76 year old male who presents for Preventive Visit.      Patient has been advised of split billing requirements and indicates understanding: Yes  Are you in the first 12 months of your Medicare coverage?  No  Review current opioid prescriptions    For a patient with a current opioid prescription:    Reviewed potential Opioid Use Disorder (OUD) risk factors: Yes  Uses prn    Evaluate their pain severity and current treatment plan:     Provide information on non-opioid treatment options:    Refer to a specialist, as appropriate:    Get more information on pain management in the [facility] Pain Management Best Practices Inter-agency Task Force Report    Screen for potential Substance Use Disorders (SUDs)    Reviewed the patient s potential risk factors for SUDs: Yes     Refer to treatment or specialist, as appropriate:     A screening tool isn t required but you may use one:  Find more information in the [place] on Drug Abuse Screening and Assessment Tools Chart    Healthy Habits:     In general, how would you rate your overall health?  Fair    Frequency of exercise:  6-7 days/week    Duration of exercise:  Other    Do you usually eat at least 4 servings of fruit and vegetables a day, include whole grains    & fiber and avoid regularly eating high fat or \"junk\" foods?  Yes    Taking medications regularly:  Yes    Medication side effects:  None    Ability to successfully perform activities of daily living:  No assistance needed    Home Safety:  No safety concerns identified    Hearing Impairment:  Need to ask people to speak up or repeat themselves    In the past 6 months, have you been bothered by leaking of urine? Yes    In general, how would you rate your overall mental or emotional health?  Good      PHQ-2 Total Score: 0    Additional concerns today:  No    Do you feel safe in your environment? Yes    Have you ever done Advance Care Planning? (For example, a Health Directive, " POLST, or a discussion with a medical provider or your loved ones about your wishes): No, advance care planning information given to patient to review.  Patient declined advance care planning discussion at this time.     Fall risk  Fallen 2 or more times in the past year?: Yes  Any fall with injury in the past year?: No  click delete button to remove this line now  Cognitive Screening   1) Repeat 3 items (Leader, Season, Table)    2) Clock draw: NORMAL  3) 3 item recall: Recalls 1 object   Results: NORMAL clock, 1-2 items recalled: COGNITIVE IMPAIRMENT LESS LIKELY    Mini-CogTM Copyright S [employer]. Licensed by the author for use in [facility]; reprinted with permission (tyler@[facility]). All rights reserved.      Do you have sleep apnea, excessive snoring or daytime drowsiness?: yes, patient states he snores and doesn't sleep very well    Reviewed and updated as needed this visit by clinical staff   Tobacco  Allergies  Meds   Med Hx  Surg Hx  Fam Hx  Soc Hx          Reviewed and updated as needed this visit by Provider                   Social History     Tobacco Use     Smoking status: Current Every Day Smoker     Packs/day: 1.00     Years: 25.00     Pack years: 25.00     Types: Cigarettes     Last attempt to quit: 2020     Years since quittin.3     Smokeless tobacco: Never Used   Substance Use Topics     Alcohol use: Not Currently     Alcohol/week: 1.0 standard drink     Comment: Rare     If you drink alcohol do you typically have >3 drinks per day or >7 drinks per week? No              Current providers sharing in care for this patient include:   Patient Care Team:  Abundio Abdi MD as PCP - General (Family Practice)  Abundio Abdi MD as Assigned PCP    The following health maintenance items are reviewed in Epic and correct as of today:  Health Maintenance Due   Topic Date Due     MICROALBUMIN  Never done     ADVANCE CARE PLANNING  Never done     ZOSTER IMMUNIZATION (1 of 2) Never  "done     LUNG CANCER SCREENING  Never done     MEDICARE ANNUAL WELLNESS VISIT  Never done     Pneumococcal Vaccine: 65+ Years (2 - PPSV23 or PCV20) 05/23/2020     LIPID  10/02/2020     COVID-19 Vaccine (4 - Booster for Pfizer series) 03/22/2022     BMP  04/21/2022     Lab work is in process  rec shingles        Review of Systems      OBJECTIVE:   /84 (BP Location: Right arm, Patient Position: Sitting, Cuff Size: Adult Regular)   Pulse 60   Temp 97.1  F (36.2  C) (Temporal)   Resp 16   Ht 1.791 m (5' 10.51\")   Wt 118 kg (260 lb 3.2 oz)   SpO2 96%   BMI 36.79 kg/m   Estimated body mass index is 36.79 kg/m  as calculated from the following:    Height as of this encounter: 1.791 m (5' 10.51\").    Weight as of this encounter: 118 kg (260 lb 3.2 oz).  Physical Exam  GENERAL: healthy, alert and no distress  NECK: no adenopathy, no asymmetry, masses, or scars and thyroid normal to palpation  RESP: lungs clear to auscultation - no rales, rhonchi or wheezes  CV: regular rate and rhythm, normal S1 S2, no S3 or S4, no murmur, click or rub, no peripheral edema and peripheral pulses strong  ABDOMEN: soft, nontender, no hepatosplenomegaly, no masses and bowel sounds normal  MS: no gross musculoskeletal defects noted, no edema    Diagnostic Test Results:  Labs reviewed in Epic    ASSESSMENT / PLAN:       ICD-10-CM    1. Wellness examination  Z00.00    2. Screening for hyperlipidemia  Z13.220 Lipid Profile   3. Essential hypertension  I10 BASIC METABOLIC PANEL     hydrochlorothiazide (HYDRODIURIL) 25 MG tablet   4. Lumbar back pain  M54.50 HYDROcodone-acetaminophen (NORCO) 7.5-325 MG per tablet   5. Neck pain  M54.2 HYDROcodone-acetaminophen (NORCO) 7.5-325 MG per tablet   6. Morbid obesity (H)  E66.01    7. Stage 3 chronic kidney disease, unspecified whether stage 3a or 3b CKD (H)  N18.30 Albumin Random Urine Quantitative with Creat Ratio       Patient has been advised of split billing requirements and indicates " "understanding: Yes    COUNSELING:  Reviewed preventive health counseling, as reflected in patient instructions       Regular exercise    Estimated body mass index is 36.79 kg/m  as calculated from the following:    Height as of this encounter: 1.791 m (5' 10.51\").    Weight as of this encounter: 118 kg (260 lb 3.2 oz).        He reports that he has been smoking cigarettes. He has a 25.00 pack-year smoking history. He has never used smokeless tobacco.  Tobacco Cessation Action Plan:         Appropriate preventive services were discussed with this patient, including applicable screening as appropriate for cardiovascular disease, diabetes, osteopenia/osteoporosis, and glaucoma.  As appropriate for age/gender, discussed screening for colorectal cancer, prostate cancer, breast cancer, and cervical cancer. Checklist reviewing preventive services available has been given to the patient.    Reviewed patients plan of care and provided an AVS. The Basic Care Plan (routine screening as documented in Health Maintenance) for Bryan meets the Care Plan requirement. This Care Plan has been established and reviewed with the Patient.    Counseling Resources:  ATP IV Guidelines  Pooled Cohorts Equation Calculator  Breast Cancer Risk Calculator  Breast Cancer: Medication to Reduce Risk  FRAX Risk Assessment  ICSI Preventive Guidelines  Dietary Guidelines for Americans, 2010  [employer]'s MyPlate  ASA Prophylaxis  Lung CA Screening    Abundio Abdi MD  [facility]    Identified Health Risks:  "

## 2022-07-05 NOTE — NURSING NOTE
"Patient presents to the clinic today for a medicare wellness exam.  Johanny Rock LPN 7/5/2022   9:07 AM    Chief Complaint   Patient presents with     Medicare Visit     Wellness       Initial /84 (BP Location: Right arm, Patient Position: Sitting, Cuff Size: Adult Regular)   Pulse 60   Temp 97.1  F (36.2  C) (Temporal)   Resp 16   Ht 1.791 m (5' 10.51\")   Wt 118 kg (260 lb 3.2 oz)   SpO2 96%   BMI 36.79 kg/m   Estimated body mass index is 36.79 kg/m  as calculated from the following:    Height as of this encounter: 1.791 m (5' 10.51\").    Weight as of this encounter: 118 kg (260 lb 3.2 oz).  Medication Reconciliation: complete  Johanny Rock LPN    "

## 2022-07-05 NOTE — LETTER
July 7, 2022       Bryan Kearney  [address] [place] [address]        Dear ,    We are writing to inform you of your test results.    Your test results fall within the expected range(s) or remain unchanged from previous results.  Please continue with current treatment plan.    Resulted Orders   BASIC METABOLIC PANEL   Result Value Ref Range    Sodium 139 134 - 144 mmol/L    Potassium 3.6 3.5 - 5.1 mmol/L    Chloride 103 98 - 107 mmol/L    Carbon Dioxide (CO2) 29 21 - 31 mmol/L    Anion Gap 7 3 - 14 mmol/L    Urea Nitrogen 25 7 - 25 mg/dL    Creatinine 1.30 0.70 - 1.30 mg/dL    Calcium 9.7 8.6 - 10.3 mg/dL    Glucose 115 (H) 70 - 105 mg/dL    GFR Estimate 56 (L) >60 mL/min/1.73m2      Comment:      Effective December 21, 2021 eGFRcr in adults is calculated using the 2021 CKD-EPI creatinine equation which includes age and gender (Hannah li al., [facility], DOI: 10.1056/TRHItp4415349)   Lipid Profile   Result Value Ref Range    Cholesterol 130 <200 mg/dL    Triglycerides 197 (H) <150 mg/dL    Direct Measure HDL 29 23 - 92 mg/dL    LDL Cholesterol Calculated 62 <=100 mg/dL    Non HDL Cholesterol 101 <130 mg/dL    Patient Fasting > 8hrs? Unknown     Narrative    Cholesterol  Desirable:  <200 mg/dL    Triglycerides  Normal:  Less than 150 mg/dL  Borderline High:  150-199 mg/dL  High:  200-499 mg/dL  Very High:  Greater than or equal to 500 mg/dL    Direct Measure HDL  Female:  Greater than or equal to 50 mg/dL   Male:  Greater than or equal to 40 mg/dL    LDL Cholesterol  Desirable:  <100mg/dL  Above Desirable:  100-129 mg/dL   Borderline High:  130-159 mg/dL   High:  160-189 mg/dL   Very High:  >= 190 mg/dL    Non HDL Cholesterol  Desirable:  130 mg/dL  Above Desirable:  130-159 mg/dL  Borderline High:  160-189 mg/dL  High:  190-219 mg/dL  Very High:  Greater than or equal to 220 mg/dL   Albumin Random Urine Quantitative with Creat Ratio   Result Value Ref Range    Albumin Urine mg/L 14.9  mg/L      Comment:      The reference ranges have not been established in urine albumin. The results should be integrated into the clinical context for interpretation.    Albumin Urine mg/g Cr 11.73 0.00 - 17.00 mg/g Cr      Comment:      Microalbuminuria is defined as an albumin:creatinine ratio of 17 to 299 for males and 25 to 299 for females. A ratio of albumin:creatinine of 300 or higher is indicative of overt proteinuria.  Due to biologic variability, positive results should be confirmed by a second, first-morning random or 24-hour timed urine specimen. If there is discrepancy, a third specimen is recommended. When 2 out of 3 results are in the microalbuminuria range, this is evidence for incipient nephropathy and warrants increased efforts at glucose control, blood pressure control, and institution of therapy with an angiotensin-converting-enzyme (ACE) inhibitor (if the patient can tolerate it).      Creatinine Urine mg/dL 127.0 mg/dL      Comment:      The reference ranges have not been established in urine creatinine. The results should be integrated into the clinical context for interpretation.       If you have any questions or concerns, please call the clinic at the number listed above.       Sincerely,      Abundio Abdi MD

## 2022-07-05 NOTE — LETTER
Opioid / Opioid Plus Controlled Substance Agreement    This is an agreement between you and your provider about the safe and appropriate use of controlled substance/opioids prescribed by your care team. Controlled substances are medicines that can cause physical and mental dependence (abuse).    There are strict laws about having and using these medicines. We here at [facility] are committing to working with you in your efforts to get better. To support you in this work, we ll help you schedule regular office appointments for medicine refills. If we must cancel or change your appointment for any reason, we ll make sure you have enough medicine to last until your next appointment.     As a Provider, I will:    Listen carefully to your concerns and treat you with respect.     Recommend a treatment plan that I believe is in your best interest. This plan may involve therapies other than opioid pain medication.     Talk with you often about the possible benefits, and the risk of harm of any medicine that we prescribe for you.     Provide a plan on how to taper (discontinue or go off) using this medicine if the decision is made to stop its use.    As a Patient, I understand that opioid(s):     Are a controlled substance prescribed by my care team to help me function or work and manage my condition(s).     Are strong medicines and can cause serious side effects such as:    Drowsiness, which can seriously affect my driving ability    A lower breathing rate, enough to cause death    Harm to my thinking ability     Depression     Abuse of and addiction to this medicine    Need to be taken exactly as prescribed. Combining opioids with certain medicines or chemicals (such as illegal drugs, sedatives, sleeping pills, and benzodiazepines) can be dangerous or even fatal. If I stop opioids suddenly, I may have severe withdrawal symptoms.    Do not work for all types of pain nor for all patients. If they re not helpful, I may  be asked to stop them.        The risks, benefits and side effects of these medicine(s) were explained to me. I agree that:  1. I will take part in other treatments as advised by my care team. This may be psychiatry or counseling, physical therapy, behavioral therapy, group treatment or a referral to a specialist.     2. I will keep all my appointments. I understand that this is part of the monitoring of opioids. My care team may require an office visit for EVERY opioid/controlled substance refill. If I miss appointments or don t follow instructions, my care team may stop my medicine.    3. I will take my medicines as prescribed. I will not change the dose or schedule unless my care team tells me to. There will be no refills if I run out early.     4. I may be asked to come to the clinic and complete a urine drug test or complete a pill count at any time. If I don t give a urine sample or participate in a pill count, the care team may stop my medicine.    5. I will only receive prescriptions from this clinic for chronic pain. If I am treated by another provider for acute pain issues, I will tell them that I am taking opioid pain medication for chronic pain and that I have a treatment agreement with this provider. I will inform my [facility] care team within one business day if I am given a prescription for any pain medication by another healthcare provider. My [facility] care team can contact other providers and pharmacists about my use of any medicines.    6. It is up to me to make sure that I don t run out of my medicines on weekends or holidays. If my care team is willing to refill my opioid prescription without a visit, I must request refills only during office hours. Refills may take up to 3 business days to process. I will use one pharmacy to fill all my opioid and other controlled substance prescriptions. I will notify the clinic about any changes to my insurance or medication  availability.    7. I am responsible for my prescriptions. If the medicine/prescription is lost, stolen or destroyed, it will not be replaced. I also agree not to share controlled substance medicines with anyone.    8. I am aware I should not use any illegal or recreational drugs. I agree not to drink alcohol unless my care team says I can.       9. If I enroll in the [place] [facility] Cannabis program, I will tell my care team prior to my next refill.     10. I will tell my care team right away if I become pregnant, have a new medical problem treated outside of my regular clinic, or have a change in my medications.    11. I understand that this medicine can affect my thinking, judgment and reaction time. Alcohol and drugs affect the brain and body, which can affect the safety of my driving. Being under the influence of alcohol or drugs can affect my decision-making, behaviors, personal safety, and the safety of others. Driving while impaired (DWI) can occur if a person is driving, operating, or in physical control of a car, motorcycle, boat, snowmobile, ATV, motorbike, off-road vehicle, or any other motor vehicle ([place] Statute 169A.20). I understand the risk if I choose to drive or operate any vehicle or machinery.    I understand that if I do not follow any of the conditions above, my prescriptions or treatment may be stopped or changed.          Opioids  What You Need to Know    What are opioids?   Opioids are pain medicines that must be prescribed by a doctor. They are also known as narcotics.     Examples are:   1. morphine (MS Contin, Mary Kay)  2. oxycodone (Oxycontin)  3. oxycodone and acetaminophen (Percocet)  4. hydrocodone and acetaminophen (Vicodin, Norco)   5. fentanyl patch (Duragesic)   6. hydromorphone (Dilaudid)   7. methadone  8. codeine (Tylenol #3)     What do opioids do well?   Opioids are best for severe short-term pain such as after a surgery or injury. They may work well for cancer pain. They may  help some people with long-lasting (chronic) pain.     What do opioids NOT do well?   Opioids never get rid of pain entirely, and they don t work well for most patients with chronic pain. Opioids don t reduce swelling, one of the causes of pain.                                    Other ways to manage chronic pain and improve function include:       Treat the health problem that may be causing pain    Anti-inflammation medicines, which reduce swelling and tenderness, such as ibuprofen (Advil, Motrin) or naproxen (Aleve)    Acetaminophen (Tylenol)    Antidepressants and anti-seizure medicines, especially for nerve pain    Topical treatments such as patches or creams    Injections or nerve blocks    Chiropractic or osteopathic treatment    Acupuncture, massage, deep breathing, meditation, visual imagery, aromatherapy    Use heat or ice at the pain site    Physical therapy     Exercise    Stop smoking    Take part in therapy       Risks and side effects     Talk to your doctor before you start or decide to keep taking opioids. Possible side effects include:      Lowering your breathing rate enough to cause death    Overdose, including death, especially if taking higher than prescribed doses    Worse depression symptoms; less pleasure in things you usually enjoy    Feeling tired or sluggish    Slower thoughts or cloudy thinking    Being more sensitive to pain over time; pain is harder to control    Trouble sleeping or restless sleep    Changes in hormone levels (for example, less testosterone)    Changes in sex drive or ability to have sex    Constipation    Unsafe driving    Itching and sweating    Dizziness    Nausea, throwing up and dry mouth    What else should I know about opioids?    Opioids may lead to dependence, tolerance, or addiction.      Dependence means that if you stop or reduce the medicine too quickly, you will have withdrawal symptoms. These include loose poop (diarrhea), jitters, flu-like symptoms,  nervousness and tremors. Dependence is not the same as addiction.                       Tolerance means needing higher doses over time to get the same effect. This may increase the chance of serious side effects.      Addiction is when people improperly use a substance that harms their body, their mind or their relations with others. Use of opiates can cause a relapse of addiction if you have a history of drug or alcohol abuse.      People who have used opioids for a long time may have a lower quality of life, worse depression, higher levels of pain and more visits to doctors.    You can overdose on opioids. Take these steps to lower your risk of overdose:    1. Recognize the signs:  Signs of overdose include decrease or loss of consciousness (blackout), slowed breathing, trouble waking up and blue lips. If someone is worried about overdose, they should call 911.    2. Talk to your doctor about Narcan (naloxone).   If you are at risk for overdose, you may be given a prescription for Narcan. This medicine very quickly reverses the effects of opioids.   If you overdose, a friend or family member can give you Narcan while waiting for the ambulance. They need to know the signs of overdose and how to give Narcan.     3. Don't use alcohol or street drugs.   Taking them with opioids can cause death.    4. Do not take any of these medicines unless your doctor says it s OK. Taking these with opioids can cause death:    Benzodiazepines, such as lorazepam (Ativan), alprazolam (Xanax) or diazepam (Valium)    Muscle relaxers, such as cyclobenzaprine (Flexeril)    Sleeping pills like zolpidem (Ambien)     Other opioids      How to keep you and other people safe while taking opioids:    1. Never share your opioids with others.  Opioid medicines are regulated by the Drug Enforcement Agency (VIN[employer]). Selling or sharing medications is a criminal act.    2. Be sure to store opioids in a secure place, locked up if possible. Young children  can easily swallow them and overdose.    3. When you are traveling with your medicines, keep them in the original bottles. If you use a pill box, be sure you also carry a copy of your medicine list from your clinic or pharmacy.    4. Safe disposal of opioids    Most pharmacies have places to get rid of medicine, called disposal kiosks. Medicine disposal options are also available in every [employer] [facility]. Search your county and  medication disposal  to find more options. You can find more details at:  [email or web address]     I agree that my provider, clinic care team, and pharmacy may work with any city, state or federal law enforcement agency that investigates the misuse, sale, or other diversion of my controlled medicine. I will allow my provider to discuss my care with, or share a copy of, this agreement with any other treating provider, pharmacy or emergency room where I receive care.    I have read this agreement and have asked questions about anything I did not understand.    _______________________________________________________  Patient Signature - Bryan Kearney _____________________                   Date     _______________________________________________________  Provider Signature - Abundio Abdi MD   _____________________                   Date     _______________________________________________________  Witness Signature (required if provider not present while patient signing)   _____________________                   Date

## 2022-10-17 DIAGNOSIS — N40.1 BENIGN PROSTATIC HYPERPLASIA WITH URINARY FREQUENCY: ICD-10-CM

## 2022-10-17 DIAGNOSIS — R35.0 BENIGN PROSTATIC HYPERPLASIA WITH URINARY FREQUENCY: ICD-10-CM

## 2022-10-18 RX ORDER — TAMSULOSIN HYDROCHLORIDE 0.4 MG/1
0.4 CAPSULE ORAL DAILY
Qty: 90 CAPSULE | Refills: 3 | Status: SHIPPED | OUTPATIENT
Start: 2022-10-18 | End: 2023-10-23

## 2022-10-18 NOTE — TELEPHONE ENCOUNTER
Last Prescription Date: 12/28/21  Last Qty/Refills: 90 / 2  Last Office Visit: 7/5/22  Future Office Visit: none     Requested Prescriptions   Pending Prescriptions Disp Refills     tamsulosin (FLOMAX) 0.4 MG capsule [Pharmacy Med Name: Tamsulosin HCl 0.4 MG Oral Capsule (Flomax)] 90 capsule 2     Sig: Take 1 Capsule by mouth one time a day.       Alpha Blockers Passed - 10/17/2022  8:49 AM        Passed - Blood pressure under 140/90 in past 12 months     BP Readings from Last 3 Encounters:   07/05/22 124/84   02/03/22 138/80   01/11/22 124/66               Corinne R Thayer, RN on 10/18/2022 at 9:02 AM

## 2022-11-03 ENCOUNTER — OFFICE VISIT (OUTPATIENT)
Dept: FAMILY MEDICINE | Facility: OTHER | Age: 77
End: 2022-11-03
Attending: FAMILY MEDICINE
Payer: COMMERCIAL

## 2022-11-03 VITALS
DIASTOLIC BLOOD PRESSURE: 68 MMHG | BODY MASS INDEX: 35.75 KG/M2 | SYSTOLIC BLOOD PRESSURE: 124 MMHG | OXYGEN SATURATION: 96 % | TEMPERATURE: 97.8 F | HEART RATE: 77 BPM | RESPIRATION RATE: 18 BRPM | WEIGHT: 252.8 LBS

## 2022-11-03 DIAGNOSIS — I10 ESSENTIAL HYPERTENSION: ICD-10-CM

## 2022-11-03 DIAGNOSIS — I10 PRIMARY HYPERTENSION: ICD-10-CM

## 2022-11-03 DIAGNOSIS — M54.2 NECK PAIN: ICD-10-CM

## 2022-11-03 DIAGNOSIS — M54.50 LUMBAR BACK PAIN: ICD-10-CM

## 2022-11-03 DIAGNOSIS — Z23 ENCOUNTER FOR IMMUNIZATION: ICD-10-CM

## 2022-11-03 DIAGNOSIS — L57.0 ACTINIC KERATOSIS: ICD-10-CM

## 2022-11-03 DIAGNOSIS — Z79.891 CHRONIC PRESCRIPTION OPIATE USE: ICD-10-CM

## 2022-11-03 PROCEDURE — 90677 PCV20 VACCINE IM: CPT

## 2022-11-03 PROCEDURE — 99214 OFFICE O/P EST MOD 30 MIN: CPT | Performed by: FAMILY MEDICINE

## 2022-11-03 PROCEDURE — G0463 HOSPITAL OUTPT CLINIC VISIT: HCPCS | Mod: 25

## 2022-11-03 PROCEDURE — 0124A COVID-19,PF,PFIZER BOOSTER BIVALENT: CPT

## 2022-11-03 PROCEDURE — 91312 COVID-19,PF,PFIZER BOOSTER BIVALENT: CPT

## 2022-11-03 RX ORDER — FLUOROURACIL 50 MG/G
CREAM TOPICAL 2 TIMES DAILY
Qty: 40 G | Refills: 1 | Status: SHIPPED | OUTPATIENT
Start: 2022-11-03

## 2022-11-03 RX ORDER — HYDROCODONE BITARTRATE AND ACETAMINOPHEN 7.5; 325 MG/1; MG/1
1 TABLET ORAL EVERY 4 HOURS PRN
Qty: 60 TABLET | Refills: 0 | Status: SHIPPED | OUTPATIENT
Start: 2022-11-03 | End: 2023-05-03

## 2022-11-03 RX ORDER — AMLODIPINE BESYLATE 10 MG/1
10 TABLET ORAL DAILY
Qty: 90 TABLET | Refills: 3 | Status: SHIPPED | OUTPATIENT
Start: 2022-11-03 | End: 2023-11-16

## 2022-11-03 RX ORDER — ATENOLOL 50 MG/1
TABLET ORAL
Qty: 90 TABLET | Refills: 3 | Status: SHIPPED | OUTPATIENT
Start: 2022-11-03 | End: 2023-11-17

## 2022-11-03 ASSESSMENT — ENCOUNTER SYMPTOMS: BACK PAIN: 1

## 2022-11-03 ASSESSMENT — ANXIETY QUESTIONNAIRES
GAD7 TOTAL SCORE: 0
6. BECOMING EASILY ANNOYED OR IRRITABLE: NOT AT ALL
5. BEING SO RESTLESS THAT IT IS HARD TO SIT STILL: NOT AT ALL
GAD7 TOTAL SCORE: 0
7. FEELING AFRAID AS IF SOMETHING AWFUL MIGHT HAPPEN: NOT AT ALL
3. WORRYING TOO MUCH ABOUT DIFFERENT THINGS: NOT AT ALL
2. NOT BEING ABLE TO STOP OR CONTROL WORRYING: NOT AT ALL
1. FEELING NERVOUS, ANXIOUS, OR ON EDGE: NOT AT ALL

## 2022-11-03 ASSESSMENT — PATIENT HEALTH QUESTIONNAIRE - PHQ9
SUM OF ALL RESPONSES TO PHQ QUESTIONS 1-9: 0
5. POOR APPETITE OR OVEREATING: NOT AT ALL

## 2022-11-03 ASSESSMENT — PAIN SCALES - GENERAL: PAINLEVEL: MODERATE PAIN (4)

## 2022-11-03 NOTE — PROGRESS NOTES
"  Assessment & Plan     Primary hypertension  Refill  - amLODIPine (NORVASC) 10 MG tablet; Take 1 tablet (10 mg) by mouth daily  - PNEUMOCOCCAL 20 VALENT CONJUGATE (PREVNAR 20)  - COVID-19,PF,PFIZER BOOSTER BIVALENT 12+Yrs    Actinic keratosis  Refill  - fluorouracil (EFUDEX) 5 % external cream; Apply topically 2 times daily    Essential hypertension  Currently under good control  - atenolol (TENORMIN) 50 MG tablet; Take 1 Tab by mouth one time a day. Strength: 50 mg    Lumbar back pain  60 tablets refill.  Should last greater than 6 months.  Follow-up for the next refill  - HYDROcodone-acetaminophen (NORCO) 7.5-325 MG per tablet; Take 1 tablet by mouth every 4 hours as needed for severe pain  - naloxone (NARCAN) 4 MG/0.1ML nasal spray; Spray 1 spray (4 mg) into one nostril alternating nostrils as needed for opioid reversal every 2-3 minutes until assistance arrives    Neck pain   is reviewed and satisfactory.  - HYDROcodone-acetaminophen (NORCO) 7.5-325 MG per tablet; Take 1 tablet by mouth every 4 hours as needed for severe pain    Chronic prescription opiate use      Encounter for immunization  Pneumococcal series completed  - PNEUMOCOCCAL 20 VALENT CONJUGATE (PREVNAR 20)             BMI:   Estimated body mass index is 35.75 kg/m  as calculated from the following:    Height as of 7/5/22: 1.791 m (5' 10.51\").    Weight as of this encounter: 114.7 kg (252 lb 12.8 oz).           No follow-ups on file.    Abundio Abdi MD  [facility]    Maik Adams is a 77 year old, presenting for the following health issues:  Back Pain      Patient arrives here for pain medication refill.   reviewed and appropriate.  Over the last years has gotten a cough medication and 1 prescription for hydrocodone.  Patient reports that pain worsens with typically activity.  If he is not bending over its quite a bit better.  He is also requesting a refill of his Efudex.  Chart review shows he is needing " refills of his blood pressure meds.  Laboratory is up-to-date.  Hypertension is under good control.  He is not having any side effects.    Back Pain                Review of Systems   Musculoskeletal: Positive for back pain.            Objective    /68   Pulse 77   Temp 97.8  F (36.6  C)   Resp 18   Wt 114.7 kg (252 lb 12.8 oz)   SpO2 96%   BMI 35.75 kg/m    Body mass index is 35.75 kg/m .  Physical Exam  Constitutional:       Appearance: Normal appearance.   HENT:      Head: Normocephalic and atraumatic.   Skin:     Comments: He does have some crusting and flaking lesions on the surface of his hand.   Neurological:      Mental Status: He is alert.   Psychiatric:         Mood and Affect: Mood normal.         Thought Content: Thought content normal.

## 2022-11-28 DIAGNOSIS — M10.9 ACUTE GOUT INVOLVING TOE OF LEFT FOOT, UNSPECIFIED CAUSE: ICD-10-CM

## 2022-11-29 RX ORDER — INDOMETHACIN 50 MG/1
CAPSULE ORAL
Qty: 30 CAPSULE | Refills: 3 | Status: SHIPPED | OUTPATIENT
Start: 2022-11-29 | End: 2024-06-04

## 2022-11-29 NOTE — TELEPHONE ENCOUNTER
[facility] sent Rx request for the following:      Requested Prescriptions   Pending Prescriptions Disp Refills     indomethacin (INDOCIN) 50 MG capsule [Pharmacy Med Name: Indomethacin 50 MG Oral Capsule (Indocin)] 30 capsule 3     Sig: Take 1 Capsule by mouth three times a day as needed with meals.       Gout Agents Protocol Failed - 11/29/2022  9:21 AM        Failed - Has Uric Acid on file in past 12 months and value is less than 6     Recent Labs   Lab Test 03/30/21  0930   URIC 9.6*     If level is 6mg/dL or greater, ok to refill one time and refer to provider.     NSAID Medications Failed - 11/29/2022  9:21 AM        Failed - Patient is age 6-64 years     Last Prescription Date:   10/27/21  Last Fill Qty/Refills:         30, R-3    Last Office Visit:              11/3/22   Future Office visit:           None    Frieda Hobbs RN .............. 11/29/2022  9:25 AM

## 2023-01-05 DIAGNOSIS — M10.00 IDIOPATHIC GOUT, UNSPECIFIED CHRONICITY, UNSPECIFIED SITE: ICD-10-CM

## 2023-01-09 RX ORDER — ALLOPURINOL 300 MG/1
TABLET ORAL
Qty: 90 TABLET | Refills: 2 | Status: SHIPPED | OUTPATIENT
Start: 2023-01-09 | End: 2023-05-03

## 2023-01-09 NOTE — TELEPHONE ENCOUNTER
[facility] sent Rx request for the following:      Requested Prescriptions   Pending Prescriptions Disp Refills     allopurinol (ZYLOPRIM) 300 MG tablet [Pharmacy Med Name: Allopurinol 300 MG Oral Tablet (Zyloprim)] 90 tablet 2     Sig: Take 1 Tablet by mouth one time a day.       Gout Agents Protocol Failed - 1/9/2023  4:00 PM        Failed - Has Uric Acid on file in past 12 months and value is less than 6     Recent Labs   Lab Test 03/30/21  0930   URIC 9.6*     If level is 6mg/dL or greater, ok to refill one time and refer to provider.      Last Prescription Date:   4/4/22  Last Fill Qty/Refills:         90, R-2    Last Office Visit:              11/3/22   Future Office visit:           None    Frieda Hobbs RN .............. 1/9/2023  4:02 PM

## 2023-02-13 ENCOUNTER — TRANSFERRED RECORDS (OUTPATIENT)
Dept: HEALTH INFORMATION MANAGEMENT | Facility: OTHER | Age: 78
End: 2023-02-13
Payer: COMMERCIAL

## 2023-03-01 DIAGNOSIS — E78.00 HYPERCHOLESTEROLEMIA: ICD-10-CM

## 2023-03-01 NOTE — TELEPHONE ENCOUNTER
[facility] sent Rx request for the following:      Requested Prescriptions   Pending Prescriptions Disp Refills     atorvastatin (LIPITOR) 20 MG tablet [Pharmacy Med Name: Atorvastatin Calcium 20 MG Oral Tablet (Lipitor)] 90 tablet 2     Sig: Take 1 Tab by mouth one time a day.   Last Prescription Date:   5/23/22  Last Fill Qty/Refills:         90, R-2    Last Office Visit:              11/3/22   Future Office visit:           None    Frieda Hobbs RN .............. 3/1/2023  4:21 PM

## 2023-03-02 RX ORDER — ATORVASTATIN CALCIUM 20 MG/1
TABLET, FILM COATED ORAL
Qty: 90 TABLET | Refills: 2 | Status: SHIPPED | OUTPATIENT
Start: 2023-03-02 | End: 2023-11-27

## 2023-03-22 ENCOUNTER — HOSPITAL ENCOUNTER (OUTPATIENT)
Dept: GENERAL RADIOLOGY | Facility: OTHER | Age: 78
Discharge: HOME | End: 2023-03-22
Attending: FAMILY MEDICINE
Payer: COMMERCIAL

## 2023-03-22 ENCOUNTER — OFFICE VISIT (OUTPATIENT)
Dept: FAMILY MEDICINE | Facility: OTHER | Age: 78
End: 2023-03-22
Attending: FAMILY MEDICINE
Payer: COMMERCIAL

## 2023-03-22 VITALS
BODY MASS INDEX: 36.96 KG/M2 | SYSTOLIC BLOOD PRESSURE: 138 MMHG | HEART RATE: 67 BPM | RESPIRATION RATE: 20 BRPM | DIASTOLIC BLOOD PRESSURE: 68 MMHG | OXYGEN SATURATION: 97 % | WEIGHT: 261.4 LBS

## 2023-03-22 DIAGNOSIS — M25.561 ACUTE PAIN OF RIGHT KNEE: ICD-10-CM
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
## 2023-03-22 DIAGNOSIS — M25.561 ACUTE PAIN OF RIGHT KNEE: Primary | ICD-10-CM

## 2023-03-22 DIAGNOSIS — T14.8XXA ABRASION: ICD-10-CM

## 2023-03-22 PROCEDURE — 90715 TDAP VACCINE 7 YRS/> IM: CPT

## 2023-03-22 PROCEDURE — G0463 HOSPITAL OUTPT CLINIC VISIT: HCPCS

## 2023-03-22 PROCEDURE — 73562 X-RAY EXAM OF KNEE 3: CPT | Mod: RT

## 2023-03-22 PROCEDURE — G0463 HOSPITAL OUTPT CLINIC VISIT: HCPCS | Mod: 25

## 2023-03-22 PROCEDURE — 90471 IMMUNIZATION ADMIN: CPT

## 2023-03-22 PROCEDURE — 99213 OFFICE O/P EST LOW 20 MIN: CPT | Performed by: FAMILY MEDICINE

## 2023-03-22 ASSESSMENT — PAIN SCALES - GENERAL: PAINLEVEL: EXTREME PAIN (8)

## 2023-03-22 NOTE — PROGRESS NOTES
SUBJECTIVE:   Bryan Kearney is a 77 year old male who presents to clinic today for the following health issues: Knee pain  Patient arrives here for right knee pain.  2 days ago he was out in the garage where he tripped and fell.  Landing on his knee.  Since then has been painful.  He is worried about a fracture.  He has noticed a little swelling in the joint.            Review of Systems     OBJECTIVE:     /68   Pulse 67   Resp 20   Wt 118.6 kg (261 lb 6.4 oz)   SpO2 97%   BMI 36.96 kg/m    Body mass index is 36.96 kg/m .  Physical Exam  Skin:     Comments: Abrasion over the patella.  Otherwise knee seems to be intact.  There is a small joint effusion present.  No pain along the medial or lateral joint line.   Neurological:      Mental Status: He is alert.             ASSESSMENT/PLAN:         (M25.561) Acute pain of right knee  (primary encounter diagnosis)  Comment:   Plan: XR Knee Right 3 Views        X-rays show moderate degenerative changes.    (T14.8XXA) Abrasion  Comment: Tetanus updated  Plan: TDAP VACCINE (Adacel, Boostrix)  [[id number]]            (W19.XXXA) Fall, initial encounter  Comment:   Plan: Advised the patient to monitor for now.  If it should worsen to follow-up or not improved.        Abundio Abdi MD  [facility] [place]

## 2023-03-22 NOTE — NURSING NOTE
Patient here for right knee pain after a trip and falling on it 2 days ago. Medication Reconciliation: complete.    Brigette Garnica LPN  3/22/2023 11:19 AM

## 2023-05-03 ENCOUNTER — OFFICE VISIT (OUTPATIENT)
Dept: FAMILY MEDICINE | Facility: OTHER | Age: 78
End: 2023-05-03
Attending: FAMILY MEDICINE
Payer: COMMERCIAL

## 2023-05-03 VITALS
TEMPERATURE: 98.3 F | BODY MASS INDEX: 36.91 KG/M2 | WEIGHT: 261 LBS | DIASTOLIC BLOOD PRESSURE: 62 MMHG | RESPIRATION RATE: 20 BRPM | HEART RATE: 69 BPM | SYSTOLIC BLOOD PRESSURE: 126 MMHG | OXYGEN SATURATION: 98 %

## 2023-05-03 DIAGNOSIS — M54.2 NECK PAIN: ICD-10-CM

## 2023-05-03 DIAGNOSIS — M54.50 LUMBAR BACK PAIN: ICD-10-CM

## 2023-05-03 PROCEDURE — 99213 OFFICE O/P EST LOW 20 MIN: CPT | Performed by: FAMILY MEDICINE

## 2023-05-03 PROCEDURE — G0463 HOSPITAL OUTPT CLINIC VISIT: HCPCS

## 2023-05-03 RX ORDER — HYDROCODONE BITARTRATE AND ACETAMINOPHEN 7.5; 325 MG/1; MG/1
1 TABLET ORAL EVERY 4 HOURS PRN
Qty: 75 TABLET | Refills: 0 | Status: SHIPPED | OUTPATIENT
Start: 2023-05-03 | End: 2024-02-28

## 2023-05-03 ASSESSMENT — PAIN SCALES - GENERAL: PAINLEVEL: MILD PAIN (2)

## 2023-05-04 ASSESSMENT — ENCOUNTER SYMPTOMS: BACK PAIN: 1

## 2023-05-04 NOTE — PROGRESS NOTES
SUBJECTIVE:   Bryan Kearney is a 77 year old male who presents to clinic today for the following health issues: Medication refill look at the side mole    Patient arrives here for medication refill.  Patient is on hydrocodone for back pain.  He has degenerative arthritis.  States he uses on a rare basis.  If he can remain active he seems to use it less.  He also like a lesion looked on his right side.  And is appropriate.  His last prescription was July of last year.  With 60 tablets.    Back Pain           Patient Active Problem List    Diagnosis Date Noted     Chronic prescription opiate use 11/03/2022     Priority: Medium     Spinal stenosis of lumbar region without neurogenic claudication 01/11/2022     Priority: Medium     Chronic kidney disease, stage 3 (H) 01/25/2021     Priority: Medium     Morbid obesity (H) 11/09/2020     Priority: Medium     Stenosis of left carotid artery 11/05/2020     Priority: Medium     Systolic murmur 11/05/2020     Priority: Medium     Benign prostatic hyperplasia with urinary frequency 03/02/2020     Priority: Medium     Hip pain, left 02/11/2019     Priority: Medium     Acute cystitis 10/10/2018     Priority: Medium     Esophageal reflux 09/05/2018     Priority: Medium     Degenerative joint disease of cervical and lumbar spine 09/05/2018     Priority: Medium     Actinic keratosis 11/29/2015     Priority: Medium     Solar keratosis 05/29/2015     Priority: Medium     Colonoscopy refused 07/29/2013     Priority: Medium     Overview:   Discussed and refused 07/2013       Hypercholesterolemia 05/11/2011     Priority: Medium     Gout 04/14/2010     Priority: Medium     Benign neoplasm of colon 09/18/2009     Priority: Medium     Overview:   benign       Hearing loss 09/18/2009     Priority: Medium     HTN (hypertension) 12/24/2007     Priority: Medium     Overview:   IMO Update 10/11         Review of Systems   Musculoskeletal: Positive for back pain.        OBJECTIVE:     BP  126/62   Pulse 69   Temp 98.3  F (36.8  C)   Resp 20   Wt 118.4 kg (261 lb)   SpO2 98%   BMI 36.91 kg/m    Body mass index is 36.91 kg/m .  Physical Exam  Constitutional:       Appearance: Normal appearance.   Skin:     Comments: Right side shows about a centimeter and a half waxy lesion elevated consistent with a seborrheic keratosis.   Neurological:      Mental Status: He is alert.         Diagnostic Test Results:  none     ASSESSMENT/PLAN:         (M54.50) Lumbar back pain  Comment: Refill  Plan: HYDROcodone-acetaminophen (NORCO) 7.5-325 MG         per tablet        (M54.2) Neck pain  Comment:   Plan: HYDROcodone-acetaminophen (NORCO) 7.5-325 MG         per tablet          Reassurance is given that the skin lesion was benign.  Follow-up for next refill.    Abundio Abdi MD  [facility]

## 2023-07-14 DIAGNOSIS — I10 ESSENTIAL HYPERTENSION: ICD-10-CM

## 2023-07-17 RX ORDER — HYDROCHLOROTHIAZIDE 25 MG/1
25 TABLET ORAL DAILY
Qty: 90 TABLET | Refills: 2 | Status: SHIPPED | OUTPATIENT
Start: 2023-07-17 | End: 2024-04-10

## 2023-07-17 NOTE — TELEPHONE ENCOUNTER
[facility] sent Rx request for the following:      Requested Prescriptions   Pending Prescriptions Disp Refills     hydrochlorothiazide (HYDRODIURIL) 25 MG tablet [Pharmacy Med Name: hydroCHLOROthiazide 25 MG Oral Tablet (Hydrodiuril)] 90 tablet 4     Sig: Take 1 tablet (25 mg) by mouth daily       Diuretics (Including Combos) Protocol Failed - 7/17/2023 11:32 AM        Failed - Normal serum creatinine on file in past 12 months     Recent Labs   Lab Test 07/05/22  0944   CR 1.30           Failed - Normal serum potassium on file in past 12 months     Recent Labs   Lab Test 07/05/22  0944   POTASSIUM 3.6           Failed - Normal serum sodium on file in past 12 months     Recent Labs   Lab Test 07/05/22  0944           Last Prescription Date:   7/5/22  Last Fill Qty/Refills:         90, R-4    Last Office Visit:              5/3/23   Future Office visit:             Next 5 appointments (look out 90 days)    Jul 27, 2023  3:20 PM  PHYSICAL with Abundio Abdi MD  [facility] ([facility] ) [address]  [place] [address]  [phone]        Frieda Hobbs RN .............. 7/17/2023  11:34 AM

## 2023-07-27 ENCOUNTER — OFFICE VISIT (OUTPATIENT)
Dept: FAMILY MEDICINE | Facility: OTHER | Age: 78
End: 2023-07-27
Attending: FAMILY MEDICINE
Payer: COMMERCIAL

## 2023-07-27 VITALS
DIASTOLIC BLOOD PRESSURE: 68 MMHG | TEMPERATURE: 97.6 F | SYSTOLIC BLOOD PRESSURE: 120 MMHG | WEIGHT: 261.2 LBS | HEART RATE: 63 BPM | OXYGEN SATURATION: 96 % | BODY MASS INDEX: 36.94 KG/M2 | RESPIRATION RATE: 20 BRPM

## 2023-07-27 DIAGNOSIS — Z00.00 MEDICARE ANNUAL WELLNESS VISIT, SUBSEQUENT: Primary | ICD-10-CM

## 2023-07-27 LAB
ALBUMIN SERPL BCG-MCNC: 4.2 G/DL (ref 3.5–5.2)
ALP SERPL-CCNC: 73 U/L (ref 40–129)
ALT SERPL W P-5'-P-CCNC: 33 U/L (ref 0–70)
ANION GAP SERPL CALCULATED.3IONS-SCNC: 9 MMOL/L (ref 7–15)
AST SERPL W P-5'-P-CCNC: 27 U/L (ref 0–45)
BASOPHILS # BLD AUTO: 0.1 10E3/UL (ref 0–0.2)
BASOPHILS NFR BLD AUTO: 1 %
BILIRUB SERPL-MCNC: 0.6 MG/DL
BUN SERPL-MCNC: 25.1 MG/DL (ref 8–23)
CALCIUM SERPL-MCNC: 9.8 MG/DL (ref 8.8–10.2)
CHLORIDE SERPL-SCNC: 105 MMOL/L (ref 98–107)
CREAT SERPL-MCNC: 1.4 MG/DL (ref 0.67–1.17)
CREAT UR-MCNC: 102.7 MG/DL
DEPRECATED HCO3 PLAS-SCNC: 27 MMOL/L (ref 22–29)
EOSINOPHIL # BLD AUTO: 0.5 10E3/UL (ref 0–0.7)
EOSINOPHIL NFR BLD AUTO: 5 %
ERYTHROCYTE [DISTWIDTH] IN BLOOD BY AUTOMATED COUNT: 13.2 % (ref 10–15)
GFR SERPL CREATININE-BSD FRML MDRD: 51 ML/MIN/1.73M2
GLUCOSE SERPL-MCNC: 102 MG/DL (ref 70–99)
HCT VFR BLD AUTO: 45.1 % (ref 40–53)
HGB BLD-MCNC: 16.1 G/DL (ref 13.3–17.7)
IMM GRANULOCYTES # BLD: 0 10E3/UL
IMM GRANULOCYTES NFR BLD: 0 %
LYMPHOCYTES # BLD AUTO: 2.3 10E3/UL (ref 0.8–5.3)
LYMPHOCYTES NFR BLD AUTO: 22 %
MCH RBC QN AUTO: 33 PG (ref 26.5–33)
MCHC RBC AUTO-ENTMCNC: 35.7 G/DL (ref 31.5–36.5)
MCV RBC AUTO: 92 FL (ref 78–100)
MICROALBUMIN UR-MCNC: 47.6 MG/L
MICROALBUMIN/CREAT UR: 46.35 MG/G CR (ref 0–17)
MONOCYTES # BLD AUTO: 0.9 10E3/UL (ref 0–1.3)
MONOCYTES NFR BLD AUTO: 9 %
NEUTROPHILS # BLD AUTO: 6.5 10E3/UL (ref 1.6–8.3)
NEUTROPHILS NFR BLD AUTO: 63 %
NRBC # BLD AUTO: 0 10E3/UL
NRBC BLD AUTO-RTO: 0 /100
PLATELET # BLD AUTO: 223 10E3/UL (ref 150–450)
POTASSIUM SERPL-SCNC: 3.9 MMOL/L (ref 3.4–5.3)
PROT SERPL-MCNC: 7.1 G/DL (ref 6.4–8.3)
RBC # BLD AUTO: 4.88 10E6/UL (ref 4.4–5.9)
SODIUM SERPL-SCNC: 141 MMOL/L (ref 136–145)
WBC # BLD AUTO: 10.3 10E3/UL (ref 4–11)

## 2023-07-27 PROCEDURE — 85025 COMPLETE CBC W/AUTO DIFF WBC: CPT | Mod: ZL | Performed by: FAMILY MEDICINE

## 2023-07-27 PROCEDURE — 36415 COLL VENOUS BLD VENIPUNCTURE: CPT | Mod: ZL | Performed by: FAMILY MEDICINE

## 2023-07-27 PROCEDURE — G0439 PPPS, SUBSEQ VISIT: HCPCS | Performed by: FAMILY MEDICINE

## 2023-07-27 PROCEDURE — G0463 HOSPITAL OUTPT CLINIC VISIT: HCPCS

## 2023-07-27 PROCEDURE — 82570 ASSAY OF URINE CREATININE: CPT | Mod: ZL | Performed by: FAMILY MEDICINE

## 2023-07-27 PROCEDURE — 80053 COMPREHEN METABOLIC PANEL: CPT | Mod: ZL | Performed by: FAMILY MEDICINE

## 2023-07-27 ASSESSMENT — ENCOUNTER SYMPTOMS
PARESTHESIAS: 0
DYSURIA: 0
MYALGIAS: 1
WEAKNESS: 0
NERVOUS/ANXIOUS: 0
CHILLS: 0
JOINT SWELLING: 0
DIARRHEA: 0
SORE THROAT: 0
CONSTIPATION: 1
HEMATOCHEZIA: 0
ARTHRALGIAS: 1
ABDOMINAL PAIN: 0
SHORTNESS OF BREATH: 0
FEVER: 0
PALPITATIONS: 0
DIZZINESS: 0
COUGH: 0
EYE PAIN: 0
NAUSEA: 0
HEARTBURN: 0
HEMATURIA: 0
HEADACHES: 0
FREQUENCY: 1

## 2023-07-27 ASSESSMENT — ACTIVITIES OF DAILY LIVING (ADL): CURRENT_FUNCTION: NO ASSISTANCE NEEDED

## 2023-07-27 ASSESSMENT — PAIN SCALES - GENERAL: PAINLEVEL: MILD PAIN (2)

## 2023-07-27 NOTE — NURSING NOTE
Patient here for wellness visit and swelling in both lower extremities. Last eye exam 2022 and last dental exam 10 years ago. Medication Reconciliation: complete.    Brigette Garnica LPN  7/27/2023 3:36 PM

## 2023-07-27 NOTE — PROGRESS NOTES
"SUBJECTIVE:   Bryan is a 77 year old who presents for Preventive Visit.      Are you in the first 12 months of your Medicare coverage?  No    Patient arrives here for wellness.  His recent past medical history includes a follow-up and scraping his right shin.  He has noticed some swelling in ecchymosis.  But states it is improving.  Requesting refills of his medication.    Healthy Habits:     In general, how would you rate your overall health?  Fair    Frequency of exercise:  None    Do you usually eat at least 4 servings of fruit and vegetables a day, include whole grains    & fiber and avoid regularly eating high fat or \"junk\" foods?  Yes    Taking medications regularly:  Yes    Medication side effects:  Not applicable    Ability to successfully perform activities of daily living:  No assistance needed    Home Safety:  No safety concerns identified    Hearing Impairment:  Need to ask people to speak up or repeat themselves    In the past 6 months, have you been bothered by leaking of urine?  No    In general, how would you rate your overall mental or emotional health?  Good    Additional concerns today:  No        Have you ever done Advance Care Planning? (For example, a Health Directive, POLST, or a discussion with a medical provider or your loved ones about your wishes): No, advance care planning information given to patient to review.  Patient plans to discuss their wishes with loved ones or provider.         Fall risk       Cognitive Screening   1) Repeat 3 items (Leader, Season, Table)    2) Clock draw: NORMAL  3) 3 item recall: Recalls 3 objects  Results: 3 items recalled: COGNITIVE IMPAIRMENT LESS LIKELY    Mini-CogTM Copyright ADEN Barnhart. Licensed by the author for use in [facility]; reprinted with permission (tyler@.[facility]). All rights reserved.          Reviewed and updated as needed this visit by clinical staff   Tobacco  Allergies  Meds              Reviewed and updated as needed this " visit by Provider                 Social History     Tobacco Use     Smoking status: Every Day     Packs/day: 1.00     Years: 25.00     Pack years: 25.00     Types: Cigarettes     Last attempt to quit: 2/28/2020     Years since quitting: 3.4     Smokeless tobacco: Never   Substance Use Topics     Alcohol use: Yes     Alcohol/week: 1.0 standard drink of alcohol     Types: 1 Standard drinks or equivalent per week     Comment: 2 beer a week             7/5/2022     9:03 AM   Alcohol Use   Prescreen: >3 drinks/day or >7 drinks/week? No     Do you have a current opioid prescription? Yes   How severe is your pain on a scale from 1-10? 2/10            No data to display              Low Risk (0-3)  Moderate Risk (4-7)  High Risk (>8)  Do you use any other controlled substances or medications that are not prescribed by a provider? None              Current providers sharing in care for this patient include:   Patient Care Team:  Abundio Abdi MD as PCP - General (Family Practice)  Abundio Abdi MD as Assigned PCP  Abundio Abdi MD as Assigned Pain Medication Provider    The following health maintenance items are reviewed in Epic and correct as of today:  Health Maintenance   Topic Date Due     ZOSTER IMMUNIZATION (1 of 2) Never done     COVID-19 Vaccine (6 - Pfizer series) 03/03/2023     BMP  07/05/2023     LIPID  07/05/2023     MICROALBUMIN  07/05/2023     FALL RISK ASSESSMENT  07/05/2023     HEMOGLOBIN  02/03/2023     NICOTINE/TOBACCO CESSATION COUNSELING Q 1 YR  07/05/2023     MEDICARE ANNUAL WELLNESS VISIT  07/05/2023     INFLUENZA VACCINE (1) 09/01/2023     ADVANCE CARE PLANNING  07/06/2027     DTAP/TDAP/TD IMMUNIZATION (3 - Td or Tdap) 03/22/2033     PHQ-2 (once per calendar year)  Completed     Pneumococcal Vaccine: 65+ Years  Completed     URINALYSIS  Completed     IPV IMMUNIZATION  Aged Out     MENINGITIS IMMUNIZATION  Aged Out     HEPATITIS C SCREENING  Discontinued     LUNG CANCER SCREENING   "Discontinued     Lab work is in process          Review of Systems   Constitutional:  Negative for chills and fever.   HENT:  Positive for ear pain and hearing loss. Negative for congestion and sore throat.    Eyes:  Negative for pain and visual disturbance.   Respiratory:  Negative for cough and shortness of breath.    Cardiovascular:  Positive for peripheral edema. Negative for chest pain and palpitations.   Gastrointestinal:  Positive for constipation. Negative for abdominal pain, diarrhea, heartburn, hematochezia and nausea.   Genitourinary:  Positive for frequency, impotence and urgency. Negative for dysuria, genital sores, hematuria and penile discharge.   Musculoskeletal:  Positive for arthralgias and myalgias. Negative for joint swelling.   Skin:  Negative for rash.   Neurological:  Negative for dizziness, weakness, headaches and paresthesias.   Psychiatric/Behavioral:  Negative for mood changes. The patient is not nervous/anxious.      Constitutional, HEENT, cardiovascular, pulmonary, gi and gu systems are negative, except as otherwise noted.    OBJECTIVE:   /68   Pulse 63   Temp 97.6  F (36.4  C)   Resp 20   Wt 118.5 kg (261 lb 3.2 oz)   SpO2 96%   BMI 36.94 kg/m   Estimated body mass index is 36.94 kg/m  as calculated from the following:    Height as of 7/5/22: 1.791 m (5' 10.51\").    Weight as of this encounter: 118.5 kg (261 lb 3.2 oz).  Physical Exam  Constitutional:       Appearance: He is obese.   HENT:      Head: Normocephalic.   Eyes:      Pupils: Pupils are equal, round, and reactive to light.   Pulmonary:      Effort: Pulmonary effort is normal.      Breath sounds: Wheezing present.   Abdominal:      General: Abdomen is flat.   Neurological:      Mental Status: He is alert.   Psychiatric:      Comments: Forgetful       GENERAL: healthy, alert and no distress  NECK: no adenopathy, no asymmetry, masses, or scars and thyroid normal to palpation  RESP: lungs clear to auscultation - no " rales, rhonchi or wheezes  CV: regular rate and rhythm, normal S1 S2, no S3 or S4, no murmur, click or rub, no peripheral edema and peripheral pulses strong  ABDOMEN: soft, nontender, no hepatosplenomegaly, no masses and bowel sounds normal  MS: no gross musculoskeletal defects noted, no edema    Diagnostic Test Results:  Labs reviewed in Epic    ASSESSMENT / PLAN:       ICD-10-CM    1. Medicare annual wellness visit, subsequent  Z00.00 CBC and Differential      2. HTN (hypertension)  I10 Comprehensive Metabolic Panel      3. Chronic kidney disease, stage 3 (H)  N18.30 Comprehensive Metabolic Panel     Albumin Random Urine Quantitative with Creat Ratio          Results for orders placed or performed in visit on 07/27/23   Albumin Random Urine Quantitative with Creat Ratio     Status: Abnormal   Result Value Ref Range    Creatinine Urine mg/dL 102.7 mg/dL    Albumin Urine mg/L 47.6 mg/L    Albumin Urine mg/g Cr 46.35 (H) 0.00 - 17.00 mg/g Cr   Comprehensive Metabolic Panel     Status: Abnormal   Result Value Ref Range    Sodium 141 136 - 145 mmol/L    Potassium 3.9 3.4 - 5.3 mmol/L    Chloride 105 98 - 107 mmol/L    Carbon Dioxide (CO2) 27 22 - 29 mmol/L    Anion Gap 9 7 - 15 mmol/L    Urea Nitrogen 25.1 (H) 8.0 - 23.0 mg/dL    Creatinine 1.40 (H) 0.67 - 1.17 mg/dL    Calcium 9.8 8.8 - 10.2 mg/dL    Glucose 102 (H) 70 - 99 mg/dL    Alkaline Phosphatase 73 40 - 129 U/L    AST 27 0 - 45 U/L    ALT 33 0 - 70 U/L    Protein Total 7.1 6.4 - 8.3 g/dL    Albumin 4.2 3.5 - 5.2 g/dL    Bilirubin Total 0.6 <=1.2 mg/dL    GFR Estimate 51 (L) >60 mL/min/1.73m2   CBC with platelets and differential     Status: None   Result Value Ref Range    WBC Count 10.3 4.0 - 11.0 10e3/uL    RBC Count 4.88 4.40 - 5.90 10e6/uL    Hemoglobin 16.1 13.3 - 17.7 g/dL    Hematocrit 45.1 40.0 - 53.0 %    MCV 92 78 - 100 fL    MCH 33.0 26.5 - 33.0 pg    MCHC 35.7 31.5 - 36.5 g/dL    RDW 13.2 10.0 - 15.0 %    Platelet Count 223 150 - 450 10e3/uL    %  Neutrophils 63 %    % Lymphocytes 22 %    % Monocytes 9 %    % Eosinophils 5 %    % Basophils 1 %    % Immature Granulocytes 0 %    NRBCs per 100 WBC 0 <1 /100    Absolute Neutrophils 6.5 1.6 - 8.3 10e3/uL    Absolute Lymphocytes 2.3 0.8 - 5.3 10e3/uL    Absolute Monocytes 0.9 0.0 - 1.3 10e3/uL    Absolute Eosinophils 0.5 0.0 - 0.7 10e3/uL    Absolute Basophils 0.1 0.0 - 0.2 10e3/uL    Absolute Immature Granulocytes 0.0 <=0.4 10e3/uL    Absolute NRBCs 0.0 10e3/uL   CBC and Differential     Status: None    Narrative    The following orders were created for panel order CBC and Differential.  Procedure                               Abnormality         Status                     ---------                               -----------         ------                     CBC with platelets and d...[[id number]]                      Final result                 Please view results for these tests on the individual orders.       Patient has been advised of split billing requirements and indicates understanding: Yes      COUNSELING:  Reviewed preventive health counseling, as reflected in patient instructions       Hearing screening       Immunizations  Vaccinated for: Pneumococcal          He reports that he has been smoking cigarettes. He has a 25.00 pack-year smoking history. He has never used smokeless tobacco.  Nicotine/Tobacco Cessation Plan:   Information offered: Patient not interested at this time    Appropriate preventive services were discussed with this patient, including applicable screening as appropriate for cardiovascular disease, diabetes, osteopenia/osteoporosis, and glaucoma.  As appropriate for age/gender, discussed screening for colorectal cancer, prostate cancer, breast cancer, and cervical cancer. Checklist reviewing preventive services available has been given to the patient.    Reviewed patients plan of care and provided an AVS. The Basic Care Plan (routine screening as documented in Health Maintenance) for  Bryan meets the Care Plan requirement. This Care Plan has been established and reviewed with the Patient.          Abundio Abdi MD  [facility]    Identified Health Risks:

## 2023-07-27 NOTE — LETTER
July 31, 2023      Bryan Kearney  [address] [place] [address]        Dear ,    We are writing to inform you of your test results.    Your test results fall within the expected range(s) or remain unchanged from previous results.  Please continue with current treatment plan.    Resulted Orders   Albumin Random Urine Quantitative with Creat Ratio   Result Value Ref Range    Creatinine Urine mg/dL 102.7 mg/dL      Comment:      The reference ranges have not been established in urine creatinine. The results should be integrated into the clinical context for interpretation.    Albumin Urine mg/L 47.6 mg/L      Comment:      The reference ranges have not been established in urine albumin. The results should be integrated into the clinical context for interpretation.    Albumin Urine mg/g Cr 46.35 (H) 0.00 - 17.00 mg/g Cr      Comment:      Microalbuminuria is defined as an albumin:creatinine ratio of 17 to 299 for males and 25 to 299 for females. A ratio of albumin:creatinine of 300 or higher is indicative of overt proteinuria.  Due to biologic variability, positive results should be confirmed by a second, first-morning random or 24-hour timed urine specimen. If there is discrepancy, a third specimen is recommended. When 2 out of 3 results are in the microalbuminuria range, this is evidence for incipient nephropathy and warrants increased efforts at glucose control, blood pressure control, and institution of therapy with an angiotensin-converting-enzyme (ACE) inhibitor (if the patient can tolerate it).     Comprehensive Metabolic Panel   Result Value Ref Range    Sodium 141 136 - 145 mmol/L    Potassium 3.9 3.4 - 5.3 mmol/L    Chloride 105 98 - 107 mmol/L    Carbon Dioxide (CO2) 27 22 - 29 mmol/L    Anion Gap 9 7 - 15 mmol/L    Urea Nitrogen 25.1 (H) 8.0 - 23.0 mg/dL    Creatinine 1.40 (H) 0.67 - 1.17 mg/dL    Calcium 9.8 8.8 - 10.2 mg/dL    Glucose 102 (H) 70 - 99 mg/dL    Alkaline  Phosphatase 73 40 - 129 U/L    AST 27 0 - 45 U/L      Comment:      Reference intervals for this test were updated on 6/12/2023 to more accurately reflect our healthy population. There may be differences in the flagging of prior results with similar values performed with this method. Interpretation of those prior results can be made in the context of the updated reference intervals.    ALT 33 0 - 70 U/L      Comment:      Reference intervals for this test were updated on 6/12/2023 to more accurately reflect our healthy population. There may be differences in the flagging of prior results with similar values performed with this method. Interpretation of those prior results can be made in the context of the updated reference intervals.      Protein Total 7.1 6.4 - 8.3 g/dL    Albumin 4.2 3.5 - 5.2 g/dL    Bilirubin Total 0.6 <=1.2 mg/dL    GFR Estimate 51 (L) >60 mL/min/1.73m2   CBC with platelets and differential   Result Value Ref Range    WBC Count 10.3 4.0 - 11.0 10e3/uL    RBC Count 4.88 4.40 - 5.90 10e6/uL    Hemoglobin 16.1 13.3 - 17.7 g/dL    Hematocrit 45.1 40.0 - 53.0 %    MCV 92 78 - 100 fL    MCH 33.0 26.5 - 33.0 pg    MCHC 35.7 31.5 - 36.5 g/dL    RDW 13.2 10.0 - 15.0 %    Platelet Count 223 150 - 450 10e3/uL    % Neutrophils 63 %    % Lymphocytes 22 %    % Monocytes 9 %    % Eosinophils 5 %    % Basophils 1 %    % Immature Granulocytes 0 %    NRBCs per 100 WBC 0 <1 /100    Absolute Neutrophils 6.5 1.6 - 8.3 10e3/uL    Absolute Lymphocytes 2.3 0.8 - 5.3 10e3/uL    Absolute Monocytes 0.9 0.0 - 1.3 10e3/uL    Absolute Eosinophils 0.5 0.0 - 0.7 10e3/uL    Absolute Basophils 0.1 0.0 - 0.2 10e3/uL    Absolute Immature Granulocytes 0.0 <=0.4 10e3/uL    Absolute NRBCs 0.0 10e3/uL       If you have any questions or concerns, please call the clinic at the number listed above.       Sincerely,      Abundio Abdi MD

## 2023-07-28 ASSESSMENT — ENCOUNTER SYMPTOMS
HEMATOCHEZIA: 0
DIARRHEA: 0
CHILLS: 0
NAUSEA: 0
HEARTBURN: 0
DIZZINESS: 0
ABDOMINAL PAIN: 0
ARTHRALGIAS: 1
NERVOUS/ANXIOUS: 0
COUGH: 0
WEAKNESS: 0
FEVER: 0
PALPITATIONS: 0
HEMATURIA: 0
CONSTIPATION: 1
PARESTHESIAS: 0
EYE PAIN: 0
MYALGIAS: 1
DYSURIA: 0
FREQUENCY: 1
JOINT SWELLING: 0
SORE THROAT: 0
SHORTNESS OF BREATH: 0
HEADACHES: 0

## 2023-07-28 ASSESSMENT — ACTIVITIES OF DAILY LIVING (ADL): CURRENT_FUNCTION: NO ASSISTANCE NEEDED

## 2023-09-25 ENCOUNTER — TELEPHONE (OUTPATIENT)
Dept: FAMILY MEDICINE | Facility: OTHER | Age: 78
End: 2023-09-25
Payer: COMMERCIAL

## 2023-09-25 DIAGNOSIS — M54.50 LUMBAR BACK PAIN: Primary | ICD-10-CM

## 2023-09-25 NOTE — TELEPHONE ENCOUNTER
Returned patient call, spoke with patient wife. Unable to give information as there is no consent to communicate. She stated she would have patient call back  Desi Lamb LPN on 9/25/2023 at 1:49 PM

## 2023-09-25 NOTE — TELEPHONE ENCOUNTER
Patient wanting to try therapy for his back pain.  He is having a lot of pain and nothing seems to be helping.  Can he get a referral for PT.  Cecille Murdock LPN .......9/25/2023 3:03 PM

## 2023-10-06 ENCOUNTER — OFFICE VISIT (OUTPATIENT)
Dept: FAMILY MEDICINE | Facility: OTHER | Age: 78
End: 2023-10-06
Payer: COMMERCIAL

## 2023-10-06 VITALS
RESPIRATION RATE: 16 BRPM | WEIGHT: 260.3 LBS | OXYGEN SATURATION: 97 % | TEMPERATURE: 97.6 F | HEART RATE: 61 BPM | SYSTOLIC BLOOD PRESSURE: 124 MMHG | DIASTOLIC BLOOD PRESSURE: 64 MMHG | BODY MASS INDEX: 36.44 KG/M2 | HEIGHT: 71 IN

## 2023-10-06 DIAGNOSIS — B02.9 ACUTE PAIN ASSOCIATED WITH HERPES ZOSTER: Primary | ICD-10-CM

## 2023-10-06 PROCEDURE — 99213 OFFICE O/P EST LOW 20 MIN: CPT | Performed by: NURSE PRACTITIONER

## 2023-10-06 PROCEDURE — G0463 HOSPITAL OUTPT CLINIC VISIT: HCPCS

## 2023-10-06 RX ORDER — PREDNISONE 20 MG/1
20 TABLET ORAL DAILY
Qty: 5 TABLET | Refills: 0 | Status: SHIPPED | OUTPATIENT
Start: 2023-10-06 | End: 2023-10-10

## 2023-10-06 ASSESSMENT — PAIN SCALES - GENERAL: PAINLEVEL: SEVERE PAIN (6)

## 2023-10-06 NOTE — NURSING NOTE
"Chief Complaint   Patient presents with    Derm Problem     Rash on back painful and spreading x 2 weeks       Initial /64   Pulse 61   Temp 97.6  F (36.4  C) (Temporal)   Resp 16   Ht 1.791 m (5' 10.5\")   Wt 118.1 kg (260 lb 4.8 oz)   SpO2 97%   BMI 36.82 kg/m   Estimated body mass index is 36.82 kg/m  as calculated from the following:    Height as of this encounter: 1.791 m (5' 10.5\").    Weight as of this encounter: 118.1 kg (260 lb 4.8 oz).  Medication Review: complete    The next two questions are to help us understand your food security.  If you are feeling you need any assistance in this area, we have resources available to support you today.           No data to display                  Health Care Directive:  Patient does not have a Health Care Directive or Living Will: Discussed advance care planning with patient; however, patient declined at this time.    Norma J. Gosselin, LPN      "
WDL

## 2023-10-06 NOTE — PATIENT INSTRUCTIONS
Antiviral therapy is not going to be helpful at this point as this presented 2 weeks ago.     A short course of prednisone was prescribed to take for 5 days to help with the pain and itching associated with this shingles rash.

## 2023-10-06 NOTE — PROGRESS NOTES
ASSESSMENT/PLAN:    I have reviewed the nursing notes.  I have reviewed the findings, diagnosis, plan and need for follow up with the patient.    1. Acute pain associated with herpes zoster  - predniSONE (DELTASONE) 20 MG tablet; Take 1 tablet (20 mg) by mouth daily for 5 days  Dispense: 5 tablet; Refill: 0  Presentation rashes consistent with shingles to the left lower back which is now crusted over.  This started 2 weeks ago, thus explained to patient that there is really no indication for antiviral therapy at this time given no new lesions and this would not be beneficial.  I did opt to trial a burst of prednisone however to help with the itch and pain associated with the rash which patient was agreeable to.  No prior history of GI bleed or diabetes mellitus.  Risk versus benefit of steroid discussed.  In the future if he develops similar rash would recommend that he come in within first 72 hours so antiviral treatment could be considered and beneficial.    Discussed warning signs/symptoms indicative of need to f/u    Follow up if symptoms persist or worsen or concerns    I explained my diagnostic considerations and recommendations to the patient, who voiced understanding and agreement with the treatment plan. All questions were answered. We discussed potential side effects of any prescribed or recommended therapies, as well as expectations for response to treatments.    Maral Falcon NP  10/6/2023  10:03 AM    HPI:  Bryan Kearney is a 78 year old male who presents to [facility] today for concerns of rash on back, painful that started 2 weeks ago. It is now starting to scab over. No new lesions. Was getting bigger in the are but is not spreading or present on any other areas of the body. There was itching present before the onset of the rash. It is only present on lower left side of back. Has had shingles vaccine. Not getting any better. No fevers.     ROS otherwise negative.     Past Medical History:    Diagnosis Date    Electrocution     H/o electrocution 13,800 volts    Essential (primary) hypertension     Hypertension    Gastro-esophageal reflux disease without esophagitis     G E R D     Past Surgical History:   Procedure Laterality Date    APPENDECTOMY OPEN      No Comments Provided    ARTHROSCOPY SHOULDER ROTATOR CUFF REPAIR      No Comments Provided    COLONOSCOPY      06/04/2001,Next colonoscopy due in 2011.    OTHER SURGICAL HISTORY      ,CAROTID ENDARDECTOMY,Left carotid endarterectomy secondary to high grade stenosis    OTHER SURGICAL HISTORY      ,CAROTID ENDARDECTOMY,Bilateral endarterectomy     Social History     Tobacco Use    Smoking status: Every Day     Packs/day: 1.00     Years: 25.00     Pack years: 25.00     Types: Cigarettes     Last attempt to quit: 2/28/2020     Years since quitting: 3.6    Smokeless tobacco: Never   Substance Use Topics    Alcohol use: Not Currently     Alcohol/week: 2.0 standard drinks of alcohol     Types: 2 Cans of beer per week     Comment: 2 beer a week     Current Outpatient Medications   Medication Sig Dispense Refill    allopurinol (ZYLOPRIM) 300 MG tablet Take 300 mg by mouth daily      amLODIPine (NORVASC) 10 MG tablet Take 1 tablet (10 mg) by mouth daily 90 tablet 3    aspirin 325 MG tablet Take 325 mg by mouth daily      atenolol (TENORMIN) 50 MG tablet Take 1 Tab by mouth one time a day. Strength: 50 mg 90 tablet 3    atorvastatin (LIPITOR) 20 MG tablet Take 1 Tab by mouth one time a day. 90 tablet 2    Calcium Carbonate-Vitamin D (CALCIUM 500 + D) 500-125 MG-UNIT TABS Take 1 tablet by mouth daily      fluorouracil (EFUDEX) 5 % external cream Apply topically 2 times daily 40 g 1    hydrochlorothiazide (HYDRODIURIL) 25 MG tablet Take 1 tablet (25 mg) by mouth daily 90 tablet 2    HYDROcodone-acetaminophen (NORCO) 7.5-325 MG per tablet Take 1 tablet by mouth every 4 hours as needed for severe pain 75 tablet 0    indomethacin (INDOCIN) 50 MG capsule  "Take 1 Capsule by mouth three times a day as needed with meals. 30 capsule 3    magnesium 250 MG tablet Take 1 tablet by mouth daily      Multiple Vitamins-Minerals (MULTIVITAMIN GUMMIES ADULT PO) Take 1 chew tab by mouth daily      naloxone (NARCAN) 4 MG/0.1ML nasal spray Spray 1 spray (4 mg) into one nostril alternating nostrils as needed for opioid reversal every 2-3 minutes until assistance arrives 0.2 mL 0    predniSONE (DELTASONE) 20 MG tablet Take 1 tablet (20 mg) by mouth daily for 5 days 5 tablet 0    tamsulosin (FLOMAX) 0.4 MG capsule Take 1 capsule (0.4 mg) by mouth daily 90 capsule 3    vitamin B6 (PYRIDOXINE) 100 MG tablet Take 100 mg by mouth daily      zinc gluconate 50 MG tablet Take 50 mg by mouth daily       No Known Allergies  Past medical history, past surgical history, current medications and allergies reviewed and accurate to the best of my knowledge.      ROS:  Refer to HPI    /64   Pulse 61   Temp 97.6  F (36.4  C) (Temporal)   Resp 16   Ht 1.791 m (5' 10.5\")   Wt 118.1 kg (260 lb 4.8 oz)   SpO2 97%   BMI 36.82 kg/m      EXAM:  General Appearance: Well appearing 78 year old male, appropriate appearance for age. No acute distress   Respiratory: normal chest wall and respirations.  Normal effort.  Clear to auscultation bilaterally, no wheezing, crackles or rhonchi.  No increased work of breathing.  No cough appreciated.  Cardiac: RRR with no murmurs  Musculoskeletal:  Equal movement of bilateral upper extremities.  Equal movement of bilateral lower extremities.  Normal gait.    Dermatological: + crusted over cluster of erythematous lesions present to left mid back approximately 8 cm x3 cm affected area.   Neuro: Alert and oriented to person, place, and time.    Psychological: normal affect, alert, oriented, and pleasant.   "

## 2023-10-10 ENCOUNTER — OFFICE VISIT (OUTPATIENT)
Dept: FAMILY MEDICINE | Facility: OTHER | Age: 78
End: 2023-10-10
Attending: FAMILY MEDICINE
Payer: COMMERCIAL

## 2023-10-10 VITALS
RESPIRATION RATE: 20 BRPM | TEMPERATURE: 97.4 F | WEIGHT: 264.6 LBS | HEART RATE: 74 BPM | OXYGEN SATURATION: 96 % | DIASTOLIC BLOOD PRESSURE: 70 MMHG | BODY MASS INDEX: 37.43 KG/M2 | SYSTOLIC BLOOD PRESSURE: 138 MMHG

## 2023-10-10 DIAGNOSIS — M47.812 DEGENERATIVE JOINT DISEASE OF CERVICAL AND LUMBAR SPINE: Primary | ICD-10-CM

## 2023-10-10 DIAGNOSIS — M47.816 DEGENERATIVE JOINT DISEASE OF CERVICAL AND LUMBAR SPINE: Primary | ICD-10-CM

## 2023-10-10 PROCEDURE — G0008 ADMIN INFLUENZA VIRUS VAC: HCPCS

## 2023-10-10 PROCEDURE — 99213 OFFICE O/P EST LOW 20 MIN: CPT | Performed by: FAMILY MEDICINE

## 2023-10-10 PROCEDURE — G0463 HOSPITAL OUTPT CLINIC VISIT: HCPCS | Mod: 25

## 2023-10-10 PROCEDURE — 90480 ADMN SARSCOV2 VAC 1/ONLY CMP: CPT

## 2023-10-10 RX ORDER — PREDNISONE 20 MG/1
20 TABLET ORAL DAILY
Qty: 7 TABLET | Refills: 3 | Status: SHIPPED | OUTPATIENT
Start: 2023-10-10 | End: 2023-11-07

## 2023-10-10 ASSESSMENT — ENCOUNTER SYMPTOMS: BACK PAIN: 1

## 2023-10-10 ASSESSMENT — PAIN SCALES - GENERAL: PAINLEVEL: NO PAIN (0)

## 2023-10-10 NOTE — PROGRESS NOTES
SUBJECTIVE:   Bryan Kearney is a 78 year old male who presents to clinic today for the following health issues: Follow-up herpes zoster    Patient arrives here for follow-up for his zoster.  He also reports he had great relief with his arthritis in his back.  He is states that he will will be wanting year and was wondering about a refill during the hunting season.    Back Pain           Patient Active Problem List    Diagnosis Date Noted     Medicare annual wellness visit, subsequent 07/27/2023     Priority: Medium     Chronic prescription opiate use 11/03/2022     Priority: Medium     Spinal stenosis of lumbar region without neurogenic claudication 01/11/2022     Priority: Medium     Chronic kidney disease, stage 3 (H) 01/25/2021     Priority: Medium     Morbid obesity (H) 11/09/2020     Priority: Medium     Stenosis of left carotid artery 11/05/2020     Priority: Medium     Systolic murmur 11/05/2020     Priority: Medium     Benign prostatic hyperplasia with urinary frequency 03/02/2020     Priority: Medium     Hip pain, left 02/11/2019     Priority: Medium     Acute cystitis 10/10/2018     Priority: Medium     Esophageal reflux 09/05/2018     Priority: Medium     Degenerative joint disease of cervical and lumbar spine 09/05/2018     Priority: Medium     Actinic keratosis 11/29/2015     Priority: Medium     Solar keratosis 05/29/2015     Priority: Medium     Colonoscopy refused 07/29/2013     Priority: Medium     Overview:   Discussed and refused 07/2013       Hypercholesterolemia 05/11/2011     Priority: Medium     Gout 04/14/2010     Priority: Medium     Benign neoplasm of colon 09/18/2009     Priority: Medium     Overview:   benign       Hearing loss 09/18/2009     Priority: Medium     HTN (hypertension) 12/24/2007     Priority: Medium     Overview:   IMO Update 10/11         Review of Systems   Musculoskeletal:  Positive for back pain.        OBJECTIVE:     /70   Pulse 74   Temp 97.4  F (36.3  C)    Resp 20   Wt 120 kg (264 lb 9.6 oz)   SpO2 96%   BMI 37.43 kg/m    Body mass index is 37.43 kg/m .  Physical Exam  Skin:     Comments: Skin lesions are drying up.   Neurological:      Mental Status: He is alert.         Diagnostic Test Results:  none     ASSESSMENT/PLAN:         (M47.812,  M47.816) Degenerative joint disease of cervical and lumbar spine  (primary encounter diagnosis)  Comment:   Plan: predniSONE (DELTASONE) 20 MG tablet  Discussed periodic use of prednisone.  I think for special occasions would be reasonable.  7-day prescription given.    Herpes zoster doing well      Abundio Abdi MD  [facility]

## 2023-10-10 NOTE — NURSING NOTE
"Chief Complaint   Patient presents with    Back Pain       Initial /70   Pulse 74   Temp 97.4  F (36.3  C)   Resp 20   Wt 120 kg (264 lb 9.6 oz)   SpO2 96%   BMI 37.43 kg/m   Estimated body mass index is 37.43 kg/m  as calculated from the following:    Height as of 10/6/23: 1.791 m (5' 10.5\").    Weight as of this encounter: 120 kg (264 lb 9.6 oz).  Medication Review: complete    The next two questions are to help us understand your food security.  If you are feeling you need any assistance in this area, we have resources available to support you today.          10/6/2023   SDOH- Food Insecurity   Within the past 12 months, did you worry that your food would run out before you got money to buy more? N   Within the past 12 months, did the food you bought just not last and you didn t have money to get more? N         Health Care Directive:  Patient does not have a Health Care Directive or Living Will: Discussed advance care planning with patient; information given to patient to review.    Brigette Garnica LPN    Medication Reconciliation: complete.    Brigette Garnica LPN  10/10/2023 9:31 AM  "

## 2023-10-13 DIAGNOSIS — R35.0 BENIGN PROSTATIC HYPERPLASIA WITH URINARY FREQUENCY: ICD-10-CM

## 2023-10-13 DIAGNOSIS — N40.1 BENIGN PROSTATIC HYPERPLASIA WITH URINARY FREQUENCY: ICD-10-CM

## 2023-10-13 DIAGNOSIS — M10.00 IDIOPATHIC GOUT, UNSPECIFIED SITE: ICD-10-CM

## 2023-10-20 NOTE — TELEPHONE ENCOUNTER
[facility] sent Rx request for the following:      Requested Prescriptions   Pending Prescriptions Disp Refills    tamsulosin (FLOMAX) 0.4 MG capsule [Pharmacy Med Name: Tamsulosin HCl 0.4 MG Oral Capsule (Flomax)] 90 capsule 3     Sig: Take 1 capsule (0.4 mg) by mouth daily   Last Prescription Date:   10/18/22  Last Fill Qty/Refills:         90, R-3        allopurinol (ZYLOPRIM) 300 MG tablet [Pharmacy Med Name: Allopurinol 300 MG Oral Tablet (Zyloprim)] 90 tablet 2     Sig: Take 1 Tablet by mouth one time a day.   Historically reported    Gout Agents Protocol Failed - 10/20/2023  4:38 PM        Failed - Has Uric Acid on file in past 12 months and value is less than 6     Recent Labs   Lab Test 03/30/21  0930   URIC 9.6*     If level is 6mg/dL or greater, ok to refill one time and refer to provider.         Failed - Normal serum creatinine on file in the past 12 months     Recent Labs   Lab Test 07/27/23  1612   CR 1.40*   Ok to refill medication if creatinine is low     Last Office Visit:              10/10/23  Future Office visit:           None    Unable to complete prescription refill per RN Medication Refill Policy.     Frieda Hobbs RN .............. 10/20/2023  4:40 PM

## 2023-10-23 RX ORDER — TAMSULOSIN HYDROCHLORIDE 0.4 MG/1
0.4 CAPSULE ORAL DAILY
Qty: 90 CAPSULE | Refills: 3 | Status: SHIPPED | OUTPATIENT
Start: 2023-10-23

## 2023-10-23 RX ORDER — ALLOPURINOL 300 MG/1
1 TABLET ORAL DAILY
Qty: 90 TABLET | Refills: 2 | Status: SHIPPED | OUTPATIENT
Start: 2023-10-23 | End: 2024-07-15

## 2023-11-16 DIAGNOSIS — I10 ESSENTIAL HYPERTENSION: ICD-10-CM

## 2023-11-17 RX ORDER — ATENOLOL 50 MG/1
TABLET ORAL
Qty: 90 TABLET | Refills: 3 | Status: SHIPPED | OUTPATIENT
Start: 2023-11-17

## 2023-11-17 NOTE — TELEPHONE ENCOUNTER
[facility] sent Rx request for the following:      Requested Prescriptions   Pending Prescriptions Disp Refills    atenolol (TENORMIN) 50 MG tablet [Pharmacy Med Name: Atenolol 50 MG Oral Tablet (Tenormin)] 90 tablet 3     Sig: Take 1 Tablet by mouth one time a day.     Last Prescription Date:   11/3/22  Last Fill Qty/Refills:         90, R-3    Last Office Visit:              10/10/23   Future Office visit:           none    Prescription approved per [facility] Refill Protocol.  Demi Barron RN on 11/17/2023 at 10:04 AM

## 2023-11-24 DIAGNOSIS — E78.00 HYPERCHOLESTEROLEMIA: ICD-10-CM

## 2023-11-27 RX ORDER — ATORVASTATIN CALCIUM 20 MG/1
TABLET, FILM COATED ORAL
Qty: 90 TABLET | Refills: 2 | Status: SHIPPED | OUTPATIENT
Start: 2023-11-27 | End: 2024-08-22

## 2023-11-27 NOTE — TELEPHONE ENCOUNTER
Last Prescription Date: 3/02/2023  Last Qty/Refills: 90 / R-2  Last Office Visit: 10/10/2023  Future Office Visit: None     Requested Prescriptions   Pending Prescriptions Disp Refills    atorvastatin (LIPITOR) 20 MG tablet [Pharmacy Med Name: Atorvastatin Calcium 20 MG Oral Tablet (Lipitor)] 90 tablet 2     Sig: Take 1 Tab by mouth one time a day.       Statins Protocol Failed - 11/27/2023  9:57 AM        Failed - LDL on file in past 12 months     Recent Labs   Lab Test 07/05/22  0944   LDL 62          Routing refill request to provider for review/approval because:  Labs out of range:  LDL      Shawnee Pichardo RN on 11/27/2023 at 10:01 AM         No

## 2024-01-31 DIAGNOSIS — I10 ESSENTIAL HYPERTENSION: ICD-10-CM

## 2024-02-01 RX ORDER — ATENOLOL 50 MG/1
TABLET ORAL
Qty: 90 TABLET | Refills: 3 | OUTPATIENT
Start: 2024-02-01

## 2024-02-01 NOTE — TELEPHONE ENCOUNTER
[facility] sent Rx request for the following:      Redundant refill request refused: Too soon:  atenolol (TENORMIN) 50 MG tablet 90 tablet 3 11/17/2023 -- No   Sig: Take 1 Tablet by mouth one time a day.   Sent to pharmacy as: Atenolol 50 MG Oral Tablet (TENORMIN)   Class: E-Prescribe   Order: [id number]   E-Prescribing Status: Receipt confirmed by pharmacy (11/17/2023 10:05 AM CST)     Printout Tracking    External Result Report     Medication Administration Instructions    Take 1 Tablet by mouth one time a day.     Pharmacy    [facility] - [place], [place] -  [address] Frieda Hobbs RN .............. 2/1/2024  9:37 AM

## 2024-02-12 ENCOUNTER — TRANSFERRED RECORDS (OUTPATIENT)
Dept: HEALTH INFORMATION MANAGEMENT | Facility: OTHER | Age: 79
End: 2024-02-12
Payer: COMMERCIAL

## 2024-02-15 ENCOUNTER — HOSPITAL ENCOUNTER (EMERGENCY)
Facility: OTHER | Age: 79
Discharge: HOME | End: 2024-02-15
Attending: PHYSICIAN ASSISTANT | Admitting: PHYSICIAN ASSISTANT
Payer: COMMERCIAL

## 2024-02-15 VITALS
TEMPERATURE: 95.7 F | RESPIRATION RATE: 20 BRPM | HEIGHT: 71 IN | WEIGHT: 264 LBS | SYSTOLIC BLOOD PRESSURE: 142 MMHG | DIASTOLIC BLOOD PRESSURE: 76 MMHG | OXYGEN SATURATION: 95 % | HEART RATE: 72 BPM | BODY MASS INDEX: 36.96 KG/M2

## 2024-02-15 DIAGNOSIS — T16.2XXA FOREIGN BODY OF LEFT EAR, INITIAL ENCOUNTER: ICD-10-CM

## 2024-02-15 PROCEDURE — 99282 EMERGENCY DEPT VISIT SF MDM: CPT | Performed by: PHYSICIAN ASSISTANT

## 2024-02-15 NOTE — ED TRIAGE NOTES
Patient comes in with part of his hearing aid stuck in his left ear.  This has happened before and he needed to come in for it to be removed.  Slight pain noted to ear.     Triage Assessment (Adult)       Row Name 02/15/24 1024          Triage Assessment    Airway WDL WDL        Respiratory WDL    Respiratory WDL WDL        Skin Circulation/Temperature WDL    Skin Circulation/Temperature WDL WDL        Cardiac WDL    Cardiac WDL WDL        Peripheral/Neurovascular WDL    Peripheral Neurovascular WDL WDL        Cognitive/Neuro/Behavioral WDL    Cognitive/Neuro/Behavioral WDL WDL

## 2024-02-15 NOTE — ED PROVIDER NOTES
History     Chief Complaint   Patient presents with    Foreign Body in Ear     HPI  Bryan Kearney is a 78 year old male who presents to the emergency department today for further evaluation of a foreign body in his left ear.    He wears hearing aids and inadvertently had a tip of the hearing aid remained trapped in his left external auditory canal.    He denies any pain.  He just cannot get the rubber tip out of his ear.    He is here for further evaluation.    Allergies:  No Known Allergies    Problem List:    Patient Active Problem List    Diagnosis Date Noted    Medicare annual wellness visit, subsequent 07/27/2023     Priority: Medium    Chronic prescription opiate use 11/03/2022     Priority: Medium    Spinal stenosis of lumbar region without neurogenic claudication 01/11/2022     Priority: Medium    Chronic kidney disease, stage 3 (H) 01/25/2021     Priority: Medium    Morbid obesity (H) 11/09/2020     Priority: Medium    Stenosis of left carotid artery 11/05/2020     Priority: Medium    Systolic murmur 11/05/2020     Priority: Medium    Benign prostatic hyperplasia with urinary frequency 03/02/2020     Priority: Medium    Hip pain, left 02/11/2019     Priority: Medium    Acute cystitis 10/10/2018     Priority: Medium    Esophageal reflux 09/05/2018     Priority: Medium    Degenerative joint disease of cervical and lumbar spine 09/05/2018     Priority: Medium    Actinic keratosis 11/29/2015     Priority: Medium    Solar keratosis 05/29/2015     Priority: Medium    Colonoscopy refused 07/29/2013     Priority: Medium     Overview:   Discussed and refused 07/2013      Hypercholesterolemia 05/11/2011     Priority: Medium    Gout 04/14/2010     Priority: Medium    Benign neoplasm of colon 09/18/2009     Priority: Medium     Overview:   benign      Hearing loss 09/18/2009     Priority: Medium    HTN (hypertension) 12/24/2007     Priority: Medium     Overview:   IMO Update 10/11          Past Medical History:     Past Medical History:   Diagnosis Date    Electrocution     Essential (primary) hypertension     Gastro-esophageal reflux disease without esophagitis        Past Surgical History:    Past Surgical History:   Procedure Laterality Date    APPENDECTOMY OPEN      No Comments Provided    ARTHROSCOPY SHOULDER ROTATOR CUFF REPAIR      No Comments Provided    COLONOSCOPY      06/04/2001,Next colonoscopy due in 2011.    OTHER SURGICAL HISTORY      ,CAROTID ENDARDECTOMY,Left carotid endarterectomy secondary to high grade stenosis    OTHER SURGICAL HISTORY      ,CAROTID ENDARDECTOMY,Bilateral endarterectomy       Family History:    Family History   Problem Relation Age of Onset    Cancer Father         Cancer,Lymphoma    Colon Cancer Brother         Cancer-colon       Social History:  Marital Status:   [2]  Social History     Tobacco Use    Smoking status: Every Day     Packs/day: 1.00     Years: 25.00     Additional pack years: 0.00     Total pack years: 25.00     Types: Cigarettes     Last attempt to quit: 2/28/2020     Years since quitting: 3.9    Smokeless tobacco: Never   Vaping Use    Vaping Use: Never used   Substance Use Topics    Alcohol use: Not Currently     Alcohol/week: 2.0 standard drinks of alcohol     Types: 2 Cans of beer per week     Comment: 2 beer a week    Drug use: No        Medications:    allopurinol (ZYLOPRIM) 300 MG tablet  amLODIPine (NORVASC) 10 MG tablet  aspirin 325 MG tablet  atenolol (TENORMIN) 50 MG tablet  atorvastatin (LIPITOR) 20 MG tablet  Calcium Carbonate-Vitamin D (CALCIUM 500 + D) 500-125 MG-UNIT TABS  fluorouracil (EFUDEX) 5 % external cream  hydrochlorothiazide (HYDRODIURIL) 25 MG tablet  HYDROcodone-acetaminophen (NORCO) 7.5-325 MG per tablet  indomethacin (INDOCIN) 50 MG capsule  magnesium 250 MG tablet  Multiple Vitamins-Minerals (MULTIVITAMIN GUMMIES ADULT PO)  naloxone (NARCAN) 4 MG/0.1ML nasal spray  tamsulosin (FLOMAX) 0.4 MG capsule  vitamin B6 (PYRIDOXINE)  "100 MG tablet  zinc gluconate 50 MG tablet          Review of Systems  All other systems were reviewed and found to be negative.      Physical Exam   BP: (!) 142/76  Pulse: 72  Temp: (!) 95.7  F (35.4  C)  Resp: 20  Height: 180.3 cm (5' 11\")  Weight: 119.7 kg (264 lb)  SpO2: 95 %      Physical Exam  Physical Exam:    General: Bryan Kearney is a very pleasant 78 year old male found resting comfortably on the exam room bed, ABCs are self, pt is alert.    Skin: Is warm, pink, and dry.    Head: Atraumatic    Eyes: Anicteric    Ears: He has noted to have a rubber tip from his hearing aid lodged in the external auditory canal of his left ear.    Mouth and Throat: Lips and surrounding mucosa are moist and pink    Chest: Pt has clear audible lung sounds    Musculoskeletal: Pt has good ROM in all extremities. CMS is intact with capillary refill < 2 seconds    Neurological: Pt is alert      ED Course     I was able to grasp the hearing aid tip with the aid of a forceps and gently extract the foreign body.    The patient states that he feels much better after removing the foreign body.  On further exam after removal, his TM is intact with no obvious trauma noted.    At this time, the patient will be discharged home in stable condition.  He is to follow-up in clinic and return to the emergency department with any worrisome concerns or worsening symptoms.         No results found for this or any previous visit (from the past 24 hour(s)).    Medications - No data to display    Assessments & Plan (with Medical Decision Making)     I have reviewed the nursing notes.    I have reviewed the findings, diagnosis, plan and need for follow up with the patient.           Medical Decision Making  The patient's presentation was of straightforward complexity (a clearly self-limited or minor problem).    The patient's evaluation involved:  history and exam without other MDM data elements    The patient's management necessitated only low " risk treatment.        New Prescriptions    No medications on file       Final diagnoses:   Foreign body of left ear, initial encounter       2/15/2024   [facility]       Jose Carlos Keith PA-C  02/15/24 [id number]

## 2024-02-28 ENCOUNTER — OFFICE VISIT (OUTPATIENT)
Dept: FAMILY MEDICINE | Facility: OTHER | Age: 79
End: 2024-02-28
Attending: FAMILY MEDICINE
Payer: COMMERCIAL

## 2024-02-28 VITALS
OXYGEN SATURATION: 94 % | RESPIRATION RATE: 24 BRPM | TEMPERATURE: 97.1 F | HEART RATE: 71 BPM | SYSTOLIC BLOOD PRESSURE: 124 MMHG | BODY MASS INDEX: 36.82 KG/M2 | DIASTOLIC BLOOD PRESSURE: 64 MMHG | WEIGHT: 264 LBS

## 2024-02-28 DIAGNOSIS — M54.50 LUMBAR BACK PAIN: ICD-10-CM

## 2024-02-28 DIAGNOSIS — N18.30 STAGE 3 CHRONIC KIDNEY DISEASE, UNSPECIFIED WHETHER STAGE 3A OR 3B CKD (H): ICD-10-CM

## 2024-02-28 DIAGNOSIS — E66.01 MORBID OBESITY (H): ICD-10-CM

## 2024-02-28 DIAGNOSIS — R10.11 RIGHT UPPER QUADRANT PAIN: Primary | ICD-10-CM

## 2024-02-28 DIAGNOSIS — M54.2 NECK PAIN: ICD-10-CM

## 2024-02-28 LAB
ANION GAP SERPL CALCULATED.3IONS-SCNC: 9 MMOL/L (ref 7–15)
BUN SERPL-MCNC: 24.7 MG/DL (ref 8–23)
CALCIUM SERPL-MCNC: 9.9 MG/DL (ref 8.8–10.2)
CHLORIDE SERPL-SCNC: 103 MMOL/L (ref 98–107)
CREAT SERPL-MCNC: 1.31 MG/DL (ref 0.67–1.17)
DEPRECATED HCO3 PLAS-SCNC: 28 MMOL/L (ref 22–29)
EGFRCR SERPLBLD CKD-EPI 2021: 55 ML/MIN/1.73M2
GLUCOSE SERPL-MCNC: 195 MG/DL (ref 70–99)
POTASSIUM SERPL-SCNC: 3.7 MMOL/L (ref 3.4–5.3)
SODIUM SERPL-SCNC: 140 MMOL/L (ref 135–145)

## 2024-02-28 PROCEDURE — 80048 BASIC METABOLIC PNL TOTAL CA: CPT | Mod: ZL | Performed by: FAMILY MEDICINE

## 2024-02-28 PROCEDURE — 36415 COLL VENOUS BLD VENIPUNCTURE: CPT | Mod: ZL | Performed by: FAMILY MEDICINE

## 2024-02-28 PROCEDURE — 99214 OFFICE O/P EST MOD 30 MIN: CPT | Performed by: FAMILY MEDICINE

## 2024-02-28 PROCEDURE — G0463 HOSPITAL OUTPT CLINIC VISIT: HCPCS | Performed by: FAMILY MEDICINE

## 2024-02-28 RX ORDER — HYDROCODONE BITARTRATE AND ACETAMINOPHEN 7.5; 325 MG/1; MG/1
1 TABLET ORAL EVERY 4 HOURS PRN
Qty: 75 TABLET | Refills: 0 | Status: SHIPPED | OUTPATIENT
Start: 2024-02-28 | End: 2024-07-01

## 2024-02-28 NOTE — NURSING NOTE
Patient here for right side pain that started 2 days prior but is getting better. He also needs refills on his pain medication. Medication Reconciliation: complete.    Brigette Garnica LPN  2/28/2024 2:51 PM

## 2024-02-28 NOTE — LETTER
February 29, 2024      Bryan Kearney  [address] [place] [address]        Dear ,    We are writing to inform you of your test results.    Your test results fall within the expected range(s) or remain unchanged from previous results.  Please continue with current treatment plan.    Resulted Orders   Basic Metabolic Panel   Result Value Ref Range    Sodium 140 135 - 145 mmol/L      Comment:      Reference intervals for this test were updated on 09/26/2023 to more accurately reflect our healthy population. There may be differences in the flagging of prior results with similar values performed with this method. Interpretation of those prior results can be made in the context of the updated reference intervals.     Potassium 3.7 3.4 - 5.3 mmol/L    Chloride 103 98 - 107 mmol/L    Carbon Dioxide (CO2) 28 22 - 29 mmol/L    Anion Gap 9 7 - 15 mmol/L    Urea Nitrogen 24.7 (H) 8.0 - 23.0 mg/dL    Creatinine 1.31 (H) 0.67 - 1.17 mg/dL    GFR Estimate 55 (L) >60 mL/min/1.73m2    Calcium 9.9 8.8 - 10.2 mg/dL    Glucose 195 (H) 70 - 99 mg/dL       If you have any questions or concerns, please call the clinic at the number listed above.       Sincerely,      Abundio Abdi MD

## 2024-02-29 NOTE — PROGRESS NOTES
SUBJECTIVE:   Bryan Kearney is a 78 year old male who presents to clinic today for the following health issues: Pain medication refill.  Right upper quadrant pain    Patient arrives with the 2 concerns.  Refill pain medication P.   was reviewed and it does not list any pain medication in the past.  This certainly is a mistake.  Patient uses it on a rare basis.  Last prescription was months ago.  Takes about 50-75 a year.  He also had a 2-day history of right upper quadrant pain.  States that now is resolved.  There is no nausea vomiting associated with it.  He still has his gallbladder in.          Patient Active Problem List    Diagnosis Date Noted    Medicare annual wellness visit, subsequent 07/27/2023     Priority: Medium    Chronic prescription opiate use 11/03/2022     Priority: Medium    Spinal stenosis of lumbar region without neurogenic claudication 01/11/2022     Priority: Medium    Chronic kidney disease, stage 3 (H) 01/25/2021     Priority: Medium    Morbid obesity (H) 11/09/2020     Priority: Medium    Stenosis of left carotid artery 11/05/2020     Priority: Medium    Systolic murmur 11/05/2020     Priority: Medium    Benign prostatic hyperplasia with urinary frequency 03/02/2020     Priority: Medium    Hip pain, left 02/11/2019     Priority: Medium    Acute cystitis 10/10/2018     Priority: Medium    Esophageal reflux 09/05/2018     Priority: Medium    Degenerative joint disease of cervical and lumbar spine 09/05/2018     Priority: Medium    Actinic keratosis 11/29/2015     Priority: Medium    Solar keratosis 05/29/2015     Priority: Medium    Colonoscopy refused 07/29/2013     Priority: Medium     Overview:   Discussed and refused 07/2013      Hypercholesterolemia 05/11/2011     Priority: Medium    Gout 04/14/2010     Priority: Medium    Benign neoplasm of colon 09/18/2009     Priority: Medium     Overview:   benign      Hearing loss 09/18/2009     Priority: Medium    HTN (hypertension)  12/24/2007     Priority: Medium     Overview:   IMO Update 10/11       Past Medical History:   Diagnosis Date    Electrocution     H/o electrocution 13,800 volts    Essential (primary) hypertension     Hypertension    Gastro-esophageal reflux disease without esophagitis     G E R D      Past Surgical History:   Procedure Laterality Date    APPENDECTOMY OPEN      No Comments Provided    ARTHROSCOPY SHOULDER ROTATOR CUFF REPAIR      No Comments Provided    COLONOSCOPY      06/04/2001,Next colonoscopy due in 2011.    OTHER SURGICAL HISTORY      ,CAROTID ENDARDECTOMY,Left carotid endarterectomy secondary to high grade stenosis    OTHER SURGICAL HISTORY      ,CAROTID ENDARDECTOMY,Bilateral endarterectomy     Family History   Problem Relation Age of Onset    Cancer Father         Cancer,Lymphoma    Colon Cancer Brother         Cancer-colon       Review of Systems     OBJECTIVE:     /64   Pulse 71   Temp 97.1  F (36.2  C)   Resp 24   Wt 119.7 kg (264 lb)   SpO2 94%   BMI 36.82 kg/m    Body mass index is 36.82 kg/m .  Physical Exam  Constitutional:       Appearance: Normal appearance.   HENT:      Head: Normocephalic and atraumatic.   Abdominal:      General: Abdomen is flat. Bowel sounds are normal. There is no distension.      Palpations: There is no mass.      Tenderness: There is no abdominal tenderness. There is no guarding.   Neurological:      Mental Status: He is alert.   Psychiatric:         Mood and Affect: Mood normal.         Thought Content: Thought content normal.         Diagnostic Test Results:  Results for orders placed or performed in visit on 02/28/24   Basic Metabolic Panel     Status: Abnormal   Result Value Ref Range    Sodium 140 135 - 145 mmol/L    Potassium 3.7 3.4 - 5.3 mmol/L    Chloride 103 98 - 107 mmol/L    Carbon Dioxide (CO2) 28 22 - 29 mmol/L    Anion Gap 9 7 - 15 mmol/L    Urea Nitrogen 24.7 (H) 8.0 - 23.0 mg/dL    Creatinine 1.31 (H) 0.67 - 1.17 mg/dL    GFR Estimate  55 (L) >60 mL/min/1.73m2    Calcium 9.9 8.8 - 10.2 mg/dL    Glucose 195 (H) 70 - 99 mg/dL         ASSESSMENT/PLAN:         (R10.11) Right upper quadrant pain  (primary encounter diagnosis)  Resolved.  Possible muscular in origin.  If reoccurrence follow-up and consider ultrasound.    (E66.01) Morbid obesity (H)  Comment: Encourage weight loss  Plan:     (N18.30) Stage 3 chronic kidney disease, unspecified whether stage 3a or 3b CKD (H)  Comment:   Plan: Basic Metabolic Panel        Stable.    (M54.50) Lumbar back pain  Comment:   Plan: HYDROcodone-acetaminophen (NORCO) 7.5-325 MG         per tablet        Hydrocodone as needed    (M54.2) Neck pain  Comment:   Plan: HYDROcodone-acetaminophen (NORCO) 7.5-325 MG         per tablet        Hydrocodone as needed could not confirm use with .      Abundio Abdi MD  [facility]

## 2024-03-07 ENCOUNTER — TELEPHONE (OUTPATIENT)
Dept: FAMILY MEDICINE | Facility: OTHER | Age: 79
End: 2024-03-07
Payer: COMMERCIAL

## 2024-03-07 NOTE — TELEPHONE ENCOUNTER
Spoke with Selina, she did get the lab results. Patient continues with right side pain. Per last office visit  says to follow up and consider scheduling an US. Transferred to the appointment line. Brigette Garnica LPN .......................3/7/2024  12:00 PM                              without difficulty

## 2024-03-07 NOTE — TELEPHONE ENCOUNTER
The patient's wife called regarding the patient's blood studies.  She would like to talk about this .

## 2024-03-13 ENCOUNTER — OFFICE VISIT (OUTPATIENT)
Dept: FAMILY MEDICINE | Facility: OTHER | Age: 79
End: 2024-03-13
Attending: FAMILY MEDICINE
Payer: COMMERCIAL

## 2024-03-13 VITALS
DIASTOLIC BLOOD PRESSURE: 68 MMHG | WEIGHT: 263.2 LBS | OXYGEN SATURATION: 96 % | SYSTOLIC BLOOD PRESSURE: 138 MMHG | BODY MASS INDEX: 36.71 KG/M2 | TEMPERATURE: 97.3 F | HEART RATE: 66 BPM | RESPIRATION RATE: 20 BRPM

## 2024-03-13 DIAGNOSIS — R10.11 RIGHT UPPER QUADRANT PAIN: ICD-10-CM

## 2024-03-13 DIAGNOSIS — R19.7 DIARRHEA OF PRESUMED INFECTIOUS ORIGIN: ICD-10-CM

## 2024-03-13 DIAGNOSIS — E83.42 HYPOMAGNESEMIA: Primary | ICD-10-CM

## 2024-03-13 LAB
ALBUMIN SERPL BCG-MCNC: 4 G/DL (ref 3.5–5.2)
ALP SERPL-CCNC: 74 U/L (ref 40–150)
ALT SERPL W P-5'-P-CCNC: 36 U/L (ref 0–70)
ANION GAP SERPL CALCULATED.3IONS-SCNC: 9 MMOL/L (ref 7–15)
AST SERPL W P-5'-P-CCNC: 26 U/L (ref 0–45)
BASOPHILS # BLD AUTO: 0 10E3/UL (ref 0–0.2)
BASOPHILS NFR BLD AUTO: 0 %
BILIRUB SERPL-MCNC: 0.5 MG/DL
BUN SERPL-MCNC: 24.3 MG/DL (ref 8–23)
CALCIUM SERPL-MCNC: 9.6 MG/DL (ref 8.8–10.2)
CHLORIDE SERPL-SCNC: 102 MMOL/L (ref 98–107)
CREAT SERPL-MCNC: 1.27 MG/DL (ref 0.67–1.17)
DEPRECATED HCO3 PLAS-SCNC: 29 MMOL/L (ref 22–29)
EGFRCR SERPLBLD CKD-EPI 2021: 57 ML/MIN/1.73M2
EOSINOPHIL # BLD AUTO: 0.3 10E3/UL (ref 0–0.7)
EOSINOPHIL NFR BLD AUTO: 3 %
ERYTHROCYTE [DISTWIDTH] IN BLOOD BY AUTOMATED COUNT: 12.8 % (ref 10–15)
GLUCOSE SERPL-MCNC: 99 MG/DL (ref 70–99)
HCT VFR BLD AUTO: 46.2 % (ref 40–53)
HGB BLD-MCNC: 16 G/DL (ref 13.3–17.7)
IMM GRANULOCYTES # BLD: 0 10E3/UL
IMM GRANULOCYTES NFR BLD: 0 %
LIPASE SERPL-CCNC: 32 U/L (ref 13–60)
LYMPHOCYTES # BLD AUTO: 2.2 10E3/UL (ref 0.8–5.3)
LYMPHOCYTES NFR BLD AUTO: 23 %
MAGNESIUM SERPL-MCNC: 2.1 MG/DL (ref 1.7–2.3)
MCH RBC QN AUTO: 32.9 PG (ref 26.5–33)
MCHC RBC AUTO-ENTMCNC: 34.6 G/DL (ref 31.5–36.5)
MCV RBC AUTO: 95 FL (ref 78–100)
MONOCYTES # BLD AUTO: 0.8 10E3/UL (ref 0–1.3)
MONOCYTES NFR BLD AUTO: 8 %
NEUTROPHILS # BLD AUTO: 6.2 10E3/UL (ref 1.6–8.3)
NEUTROPHILS NFR BLD AUTO: 65 %
NRBC # BLD AUTO: 0 10E3/UL
NRBC BLD AUTO-RTO: 0 /100
PLATELET # BLD AUTO: 215 10E3/UL (ref 150–450)
POTASSIUM SERPL-SCNC: 3.9 MMOL/L (ref 3.4–5.3)
PROT SERPL-MCNC: 7 G/DL (ref 6.4–8.3)
RBC # BLD AUTO: 4.87 10E6/UL (ref 4.4–5.9)
SODIUM SERPL-SCNC: 140 MMOL/L (ref 135–145)
WBC # BLD AUTO: 9.6 10E3/UL (ref 4–11)

## 2024-03-13 PROCEDURE — 36415 COLL VENOUS BLD VENIPUNCTURE: CPT | Mod: ZL | Performed by: FAMILY MEDICINE

## 2024-03-13 PROCEDURE — 83690 ASSAY OF LIPASE: CPT | Mod: ZL | Performed by: FAMILY MEDICINE

## 2024-03-13 PROCEDURE — 83735 ASSAY OF MAGNESIUM: CPT | Mod: ZL | Performed by: FAMILY MEDICINE

## 2024-03-13 PROCEDURE — 80053 COMPREHEN METABOLIC PANEL: CPT | Mod: ZL | Performed by: FAMILY MEDICINE

## 2024-03-13 PROCEDURE — 99213 OFFICE O/P EST LOW 20 MIN: CPT | Performed by: FAMILY MEDICINE

## 2024-03-13 PROCEDURE — G0463 HOSPITAL OUTPT CLINIC VISIT: HCPCS | Performed by: FAMILY MEDICINE

## 2024-03-13 PROCEDURE — 85041 AUTOMATED RBC COUNT: CPT | Mod: ZL | Performed by: FAMILY MEDICINE

## 2024-03-13 ASSESSMENT — PAIN SCALES - GENERAL: PAINLEVEL: MILD PAIN (2)

## 2024-03-13 NOTE — NURSING NOTE
Patient here for right flank pain and now diarrhea for the past 5 plus days.Medication Reconciliation: complete.    Brigette Garnica LPN  3/13/2024 2:25 PM

## 2024-03-13 NOTE — LETTER
March 14, 2024      Bryan Kearney  [address] [place] [address]        Dear ,    We are writing to inform you of your test results.    Your test results fall within the expected range(s) or remain unchanged from previous results.  Please continue with current treatment plan.  As you can see your magnesium did come back normal.  I would recommend discontinuing it.  We can check it at a future date.    Resulted Orders   Magnesium   Result Value Ref Range    Magnesium 2.1 1.7 - 2.3 mg/dL   Comprehensive Metabolic Panel   Result Value Ref Range    Sodium 140 135 - 145 mmol/L      Comment:      Reference intervals for this test were updated on 09/26/2023 to more accurately reflect our healthy population. There may be differences in the flagging of prior results with similar values performed with this method. Interpretation of those prior results can be made in the context of the updated reference intervals.     Potassium 3.9 3.4 - 5.3 mmol/L    Carbon Dioxide (CO2) 29 22 - 29 mmol/L    Anion Gap 9 7 - 15 mmol/L    Urea Nitrogen 24.3 (H) 8.0 - 23.0 mg/dL    Creatinine 1.27 (H) 0.67 - 1.17 mg/dL    GFR Estimate 57 (L) >60 mL/min/1.73m2    Calcium 9.6 8.8 - 10.2 mg/dL    Chloride 102 98 - 107 mmol/L    Glucose 99 70 - 99 mg/dL    Alkaline Phosphatase 74 40 - 150 U/L      Comment:      Reference intervals for this test were updated on 11/14/2023 to more accurately reflect our healthy population. There may be differences in the flagging of prior results with similar values performed with this method. Interpretation of those prior results can be made in the context of the updated reference intervals.    AST 26 0 - 45 U/L      Comment:      Reference intervals for this test were updated on 6/12/2023 to more accurately reflect our healthy population. There may be differences in the flagging of prior results with similar values performed with this method. Interpretation of those prior results  can be made in the context of the updated reference intervals.    ALT 36 0 - 70 U/L      Comment:      Reference intervals for this test were updated on 6/12/2023 to more accurately reflect our healthy population. There may be differences in the flagging of prior results with similar values performed with this method. Interpretation of those prior results can be made in the context of the updated reference intervals.      Protein Total 7.0 6.4 - 8.3 g/dL    Albumin 4.0 3.5 - 5.2 g/dL    Bilirubin Total 0.5 <=1.2 mg/dL   Lipase   Result Value Ref Range    Lipase 32 13 - 60 U/L   CBC with platelets and differential   Result Value Ref Range    WBC Count 9.6 4.0 - 11.0 10e3/uL    RBC Count 4.87 4.40 - 5.90 10e6/uL    Hemoglobin 16.0 13.3 - 17.7 g/dL    Hematocrit 46.2 40.0 - 53.0 %    MCV 95 78 - 100 fL    MCH 32.9 26.5 - 33.0 pg    MCHC 34.6 31.5 - 36.5 g/dL    RDW 12.8 10.0 - 15.0 %    Platelet Count 215 150 - 450 10e3/uL    % Neutrophils 65 %    % Lymphocytes 23 %    % Monocytes 8 %    % Eosinophils 3 %    % Basophils 0 %    % Immature Granulocytes 0 %    NRBCs per 100 WBC 0 <1 /100    Absolute Neutrophils 6.2 1.6 - 8.3 10e3/uL    Absolute Lymphocytes 2.2 0.8 - 5.3 10e3/uL    Absolute Monocytes 0.8 0.0 - 1.3 10e3/uL    Absolute Eosinophils 0.3 0.0 - 0.7 10e3/uL    Absolute Basophils 0.0 0.0 - 0.2 10e3/uL    Absolute Immature Granulocytes 0.0 <=0.4 10e3/uL    Absolute NRBCs 0.0 10e3/uL       If you have any questions or concerns, please call the clinic at the number listed above.       Sincerely,      Abundio Abdi MD

## 2024-03-14 ENCOUNTER — LAB (OUTPATIENT)
Dept: LAB | Facility: OTHER | Age: 79
End: 2024-03-14
Attending: FAMILY MEDICINE
Payer: COMMERCIAL

## 2024-03-14 DIAGNOSIS — R19.7 DIARRHEA OF PRESUMED INFECTIOUS ORIGIN: ICD-10-CM

## 2024-03-14 LAB

## 2024-03-14 PROCEDURE — 87507 IADNA-DNA/RNA PROBE TQ 12-25: CPT | Mod: ZL

## 2024-03-14 PROCEDURE — 87493 C DIFF AMPLIFIED PROBE: CPT | Mod: ZL,XU

## 2024-03-14 NOTE — PROGRESS NOTES
SUBJECTIVE:   Bryan Kearney is a 78 year old male who presents to clinic today for the following health issues: Right-sided abdominal pain and diarrhea    Patient arrives here for right-sided abdominal pain and diarrhea.  The right side pain has been going on for a number of months.  Started maybe back in November.  Has not improved.  The diarrhea for 5 days.  Not associated with any bloody or mucousy stools.  No recent antibiotics.  No recent change in medications.  Nothing seems to make it better or worse.  Seems to just be constantly get there.  He denies any exposures.  No new foods.          Patient Active Problem List    Diagnosis Date Noted    Medicare annual wellness visit, subsequent 07/27/2023     Priority: Medium    Chronic prescription opiate use 11/03/2022     Priority: Medium    Spinal stenosis of lumbar region without neurogenic claudication 01/11/2022     Priority: Medium    Chronic kidney disease, stage 3 (H) 01/25/2021     Priority: Medium    Morbid obesity (H) 11/09/2020     Priority: Medium    Stenosis of left carotid artery 11/05/2020     Priority: Medium    Systolic murmur 11/05/2020     Priority: Medium    Benign prostatic hyperplasia with urinary frequency 03/02/2020     Priority: Medium    Hip pain, left 02/11/2019     Priority: Medium    Acute cystitis 10/10/2018     Priority: Medium    Esophageal reflux 09/05/2018     Priority: Medium    Degenerative joint disease of cervical and lumbar spine 09/05/2018     Priority: Medium    Actinic keratosis 11/29/2015     Priority: Medium    Solar keratosis 05/29/2015     Priority: Medium    Colonoscopy refused 07/29/2013     Priority: Medium     Overview:   Discussed and refused 07/2013      Hypercholesterolemia 05/11/2011     Priority: Medium    Gout 04/14/2010     Priority: Medium    Benign neoplasm of colon 09/18/2009     Priority: Medium     Overview:   benign      Hearing loss 09/18/2009     Priority: Medium    HTN (hypertension) 12/24/2007      Priority: Medium     Overview:   IMO Update 10/11       Past Medical History:   Diagnosis Date    Electrocution     H/o electrocution 13,800 volts    Essential (primary) hypertension     Hypertension    Gastro-esophageal reflux disease without esophagitis     G E R D      Past Surgical History:   Procedure Laterality Date    APPENDECTOMY OPEN      No Comments Provided    ARTHROSCOPY SHOULDER ROTATOR CUFF REPAIR      No Comments Provided    COLONOSCOPY      06/04/2001,Next colonoscopy due in 2011.    OTHER SURGICAL HISTORY      ,CAROTID ENDARDECTOMY,Left carotid endarterectomy secondary to high grade stenosis    OTHER SURGICAL HISTORY      ,CAROTID ENDARDECTOMY,Bilateral endarterectomy       Review of Systems     OBJECTIVE:     /68   Pulse 66   Temp 97.3  F (36.3  C)   Resp 20   Wt 119.4 kg (263 lb 3.2 oz)   SpO2 96%   BMI 36.71 kg/m    Body mass index is 36.71 kg/m .  Physical Exam  Constitutional:       Appearance: Normal appearance.   HENT:      Head: Normocephalic and atraumatic.   Abdominal:      Comments: No masses palpated some generalized uncomfortableness on the right side.   Skin:     General: Skin is warm.   Neurological:      Mental Status: He is alert.   Psychiatric:         Mood and Affect: Mood normal.         Thought Content: Thought content normal.         Diagnostic Test Results:  Results for orders placed or performed in visit on 03/13/24 (from the past 24 hour(s))   Magnesium   Result Value Ref Range    Magnesium 2.1 1.7 - 2.3 mg/dL   Comprehensive Metabolic Panel   Result Value Ref Range    Sodium 140 135 - 145 mmol/L    Potassium 3.9 3.4 - 5.3 mmol/L    Carbon Dioxide (CO2) 29 22 - 29 mmol/L    Anion Gap 9 7 - 15 mmol/L    Urea Nitrogen 24.3 (H) 8.0 - 23.0 mg/dL    Creatinine 1.27 (H) 0.67 - 1.17 mg/dL    GFR Estimate 57 (L) >60 mL/min/1.73m2    Calcium 9.6 8.8 - 10.2 mg/dL    Chloride 102 98 - 107 mmol/L    Glucose 99 70 - 99 mg/dL    Alkaline Phosphatase 74 40 - 150  U/L    AST 26 0 - 45 U/L    ALT 36 0 - 70 U/L    Protein Total 7.0 6.4 - 8.3 g/dL    Albumin 4.0 3.5 - 5.2 g/dL    Bilirubin Total 0.5 <=1.2 mg/dL   CBC and Differential    Narrative    The following orders were created for panel order CBC and Differential.  Procedure                               Abnormality         Status                     ---------                               -----------         ------                     CBC with platelets and d...[[id number]]                      Final result                 Please view results for these tests on the individual orders.   Lipase   Result Value Ref Range    Lipase 32 13 - 60 U/L   CBC with platelets and differential   Result Value Ref Range    WBC Count 9.6 4.0 - 11.0 10e3/uL    RBC Count 4.87 4.40 - 5.90 10e6/uL    Hemoglobin 16.0 13.3 - 17.7 g/dL    Hematocrit 46.2 40.0 - 53.0 %    MCV 95 78 - 100 fL    MCH 32.9 26.5 - 33.0 pg    MCHC 34.6 31.5 - 36.5 g/dL    RDW 12.8 10.0 - 15.0 %    Platelet Count 215 150 - 450 10e3/uL    % Neutrophils 65 %    % Lymphocytes 23 %    % Monocytes 8 %    % Eosinophils 3 %    % Basophils 0 %    % Immature Granulocytes 0 %    NRBCs per 100 WBC 0 <1 /100    Absolute Neutrophils 6.2 1.6 - 8.3 10e3/uL    Absolute Lymphocytes 2.2 0.8 - 5.3 10e3/uL    Absolute Monocytes 0.8 0.0 - 1.3 10e3/uL    Absolute Eosinophils 0.3 0.0 - 0.7 10e3/uL    Absolute Basophils 0.0 0.0 - 0.2 10e3/uL    Absolute Immature Granulocytes 0.0 <=0.4 10e3/uL    Absolute NRBCs 0.0 10e3/uL       ASSESSMENT/PLAN:         (E83.42) Hypomagnesemia  (primary encounter diagnosis)  Comment: Magnesium level was normal.  Will discontinue the magnesium in case this could be causing some diarrhea problems.  Plan: Magnesium            (R10.11) Right upper quadrant pain  Comment:   Plan: Comprehensive Metabolic Panel, CBC and         Differential, Lipase, CT Abdomen Pelvis w         Contrast        Extending down into the right lower quadrant.  CT scan of the  abdomen    (R19.7) Diarrhea of presumed infectious origin  Comment:   Plan: Enteric Bacteria and Virus Panel PCR, C.         difficile Toxin B PCR with reflex to C.         difficile Antigen and Toxins A/B EIA              Abundio Abdi MD  [facility]

## 2024-04-01 ENCOUNTER — HOSPITAL ENCOUNTER (OUTPATIENT)
Dept: CT IMAGING | Facility: OTHER | Age: 79
Discharge: HOME | End: 2024-04-01
Attending: FAMILY MEDICINE | Admitting: FAMILY MEDICINE
Payer: COMMERCIAL

## 2024-04-01 DIAGNOSIS — R10.11 RIGHT UPPER QUADRANT PAIN: ICD-10-CM

## 2024-04-01 PROCEDURE — 74177 CT ABD & PELVIS W/CONTRAST: CPT

## 2024-04-01 PROCEDURE — 250N000011 HC RX IP 250 OP 636: Performed by: FAMILY MEDICINE

## 2024-04-01 RX ORDER — IOPAMIDOL 755 MG/ML
150 INJECTION, SOLUTION INTRAVASCULAR ONCE
Status: COMPLETED | OUTPATIENT
Start: 2024-04-01 | End: 2024-04-01

## 2024-04-01 RX ADMIN — IOPAMIDOL 150 ML: 755 INJECTION, SOLUTION INTRAVENOUS at 15:21

## 2024-04-03 ENCOUNTER — TELEPHONE (OUTPATIENT)
Dept: FAMILY MEDICINE | Facility: OTHER | Age: 79
End: 2024-04-03
Payer: COMMERCIAL

## 2024-04-03 NOTE — TELEPHONE ENCOUNTER
Please review CT scan and I will return a call to patient. Brigette Garnica LPN .......................4/3/2024  1:54 PM

## 2024-04-03 NOTE — TELEPHONE ENCOUNTER
Called and spoke with wife and patient letting them know that results were normal and to stop the magnesium. CT results were normal. They are wondering why he still has the pain. Patient would like a call back to discuss the next step to figure this pain out. Brigette Garnica LPN .......................4/3/2024  3:13 PM

## 2024-04-04 NOTE — TELEPHONE ENCOUNTER
Spoke with patient and wife we will see them on Monday at 9:00am.  Brigette Garnica LPN .......................4/4/2024  10:16 AM

## 2024-04-05 DIAGNOSIS — I10 ESSENTIAL HYPERTENSION: ICD-10-CM

## 2024-04-08 ENCOUNTER — OFFICE VISIT (OUTPATIENT)
Dept: FAMILY MEDICINE | Facility: OTHER | Age: 79
End: 2024-04-08
Attending: FAMILY MEDICINE
Payer: COMMERCIAL

## 2024-04-08 VITALS
WEIGHT: 262.2 LBS | SYSTOLIC BLOOD PRESSURE: 122 MMHG | HEART RATE: 52 BPM | TEMPERATURE: 97.1 F | BODY MASS INDEX: 36.57 KG/M2 | OXYGEN SATURATION: 94 % | DIASTOLIC BLOOD PRESSURE: 64 MMHG | RESPIRATION RATE: 20 BRPM

## 2024-04-08 DIAGNOSIS — R73.9 ELEVATED RANDOM BLOOD GLUCOSE LEVEL: ICD-10-CM

## 2024-04-08 DIAGNOSIS — R10.11 RIGHT UPPER QUADRANT PAIN: Primary | ICD-10-CM

## 2024-04-08 LAB — HBA1C MFR BLD: 6.2 % (ref 4–6.2)

## 2024-04-08 PROCEDURE — 83036 HEMOGLOBIN GLYCOSYLATED A1C: CPT | Mod: ZL | Performed by: FAMILY MEDICINE

## 2024-04-08 PROCEDURE — G0463 HOSPITAL OUTPT CLINIC VISIT: HCPCS

## 2024-04-08 PROCEDURE — 36415 COLL VENOUS BLD VENIPUNCTURE: CPT | Mod: ZL | Performed by: FAMILY MEDICINE

## 2024-04-08 PROCEDURE — 99213 OFFICE O/P EST LOW 20 MIN: CPT | Performed by: FAMILY MEDICINE

## 2024-04-08 ASSESSMENT — PAIN SCALES - GENERAL: PAINLEVEL: NO PAIN (1)

## 2024-04-08 NOTE — NURSING NOTE
Patient here with wife and daughter to follow up on right side pain. Medication Reconciliation: complete.    Brigette Garnica, LPN  4/8/2024 9:00 AM

## 2024-04-08 NOTE — PROGRESS NOTES
SUBJECTIVE:   Bryan Kearney is a 78 year old male who presents to clinic today for the following health issues: Follow-up abdominal pain    Patient arrives here for follow-up abdominal pain.  He states that he is getting better.  His CT scan was reviewed with both the patient wife and daughter.  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  Patient does have a history of elevated blood sugar.  Would like to get an A1c drawn also    Pain    History of Present Illness       Reason for visit:  Follow up results    He eats 2-3 servings of fruits and vegetables daily.He consumes 2 sweetened beverage(s) daily.He exercises with enough effort to increase his heart rate 10 to 19 minutes per day.  He exercises with enough effort to increase his heart rate 3 or less days per week.   He is taking medications regularly.        Patient Active Problem List    Diagnosis Date Noted    Medicare annual wellness visit, subsequent 07/27/2023     Priority: Medium    Chronic prescription opiate use 11/03/2022     Priority: Medium    Spinal stenosis of lumbar region without neurogenic claudication 01/11/2022     Priority: Medium    Chronic kidney disease, stage 3 (H) 01/25/2021     Priority: Medium    Morbid obesity (H) 11/09/2020     Priority: Medium    Stenosis of left carotid artery 11/05/2020     Priority: Medium    Systolic murmur 11/05/2020     Priority: Medium    Benign prostatic hyperplasia with urinary frequency 03/02/2020     Priority: Medium    Hip pain, left 02/11/2019     Priority: Medium    Acute cystitis 10/10/2018     Priority: Medium    Esophageal reflux 09/05/2018     Priority: Medium    Degenerative joint disease of cervical and lumbar spine 09/05/2018     Priority: Medium    Actinic keratosis 11/29/2015     Priority: Medium    Solar keratosis 05/29/2015     Priority: Medium    Colonoscopy refused 07/29/2013     Priority: Medium     Overview:   Discussed and refused 07/2013       Hypercholesterolemia 05/11/2011     Priority: Medium    Gout 04/14/2010     Priority: Medium    Benign neoplasm of colon 09/18/2009     Priority: Medium     Overview:   benign      Hearing loss 09/18/2009     Priority: Medium    HTN (hypertension) 12/24/2007     Priority: Medium     Overview:   IMO Update 10/11       Past Medical History:   Diagnosis Date    Electrocution     H/o electrocution 13,800 volts    Essential (primary) hypertension     Hypertension    Gastro-esophageal reflux disease without esophagitis     G E R D        Review of Systems     OBJECTIVE:     /64   Pulse 52   Temp 97.1  F (36.2  C)   Resp 20   Wt 118.9 kg (262 lb 3.2 oz)   SpO2 94%   BMI 36.57 kg/m    Body mass index is 36.57 kg/m .  Physical Exam  Constitutional:       Appearance: Normal appearance.   Abdominal:      General: Abdomen is flat. There is no distension.      Tenderness: There is no abdominal tenderness.   Neurological:      Mental Status: He is alert.         Diagnostic Test Results:  Results for orders placed or performed in visit on 04/08/24 (from the past 24 hour(s))   Hemoglobin A1c   Result Value Ref Range    Hemoglobin A1C 6.2 4.0 - 6.2 %       ASSESSMENT/PLAN:         (R10.11) Right upper quadrant pain  (primary encounter diagnosis)  Comment: Resolving.  They do give a history of him working outside quite a bit.  That potentially might of contributed to the abdominal pain.  Also discussed mesenteric ischemia.  Plan:     (R73.09) Elevated random blood glucose level  Comment: A1c at the upper limit of normal.  Continue to monitor  Plan: Hemoglobin A1c              Abundio Abdi MD  [facility]

## 2024-04-08 NOTE — LETTER
April 8, 2024      Bryan Kearney  [address] [place] [address]        Dear ,    We are writing to inform you of your test results.    Your test results fall within the expected range(s) or remain unchanged from previous results.  Please continue with current treatment plan.  I would recommend A1c's every year.  Please call if you have any questions.    Resulted Orders   Hemoglobin A1c   Result Value Ref Range    Hemoglobin A1C 6.2 4.0 - 6.2 %       If you have any questions or concerns, please call the clinic at the number listed above.       Sincerely,      Abundio Abdi MD

## 2024-04-09 NOTE — TELEPHONE ENCOUNTER
Ingrid Alfonso sent Rx request for the following:      Requested Prescriptions   Pending Prescriptions Disp Refills    hydrochlorothiazide (HYDRODIURIL) 25 MG tablet [Pharmacy Med Name: hydroCHLOROthiazide 25 MG Oral Tablet (Hydrodiuril)] 90 tablet 2     Sig: Take 1 tablet (25 mg) by mouth daily       Diuretics (Including Combos) Protocol Failed - 4/5/2024  6:38 AM        Failed - Has GFR on file in past 12 months and most recent value is normal          Last Prescription Date:   7/17/23  Last Fill Qty/Refills:         90, R-2    Last Office Visit:              4/8/24  Future Office visit:           none    Routing refill request to provider for review/approval because:  Labs out of range:  GFR    Marleen Millard RN on 4/9/2024 at 2:21 PM

## 2024-04-10 RX ORDER — HYDROCHLOROTHIAZIDE 25 MG/1
25 TABLET ORAL DAILY
Qty: 90 TABLET | Refills: 2 | Status: SHIPPED | OUTPATIENT
Start: 2024-04-10

## 2024-06-03 DIAGNOSIS — M10.9 ACUTE GOUT INVOLVING TOE OF LEFT FOOT, UNSPECIFIED CAUSE: ICD-10-CM

## 2024-06-03 NOTE — TELEPHONE ENCOUNTER
[facility] sent Rx request for the following:      Requested Prescriptions   Pending Prescriptions Disp Refills    indomethacin (INDOCIN) 50 MG capsule [Pharmacy Med Name: Indomethacin 50 MG Oral Capsule (Indocin)] 30 capsule 3     Sig: Take 1 Capsule by mouth three times a day as needed with meals.       Gout Agents Protocol Failed - 6/3/2024  4:10 PM        Failed - Has Uric Acid on file in past 12 months and value is less than 6     Recent Labs   Lab Test 03/30/21  0930   URIC 9.6*     If level is 6mg/dL or greater, ok to refill one time and refer to provider.         Failed - Has GFR on file in past 12 months and most recent value is normal    NSAID Medications Failed - 6/3/2024  4:10 PM        Failed - Patient is age 6-64 years        Failed - Always Fail Criteria - Chart Review Required     Validate Diagnosis. If the medication is requested for an acute flare of a chronic pain associated with a musculoskeletal or rheumatologic condition; okay to authorize if all other criteria are met. If not, then forward to provider for review.        Failed - Normal GFR on file in past 12 months     Recent Labs   Lab Test 03/13/24  1452 07/05/22  0944 04/21/21  1530   GFRESTIMATED 57*   < > 55*   GFRESTBLACK  --   --  66    < > = values in this interval not displayed.        Last Prescription Date:   11/29/22  Last Fill Qty/Refills:         30, R-3    Last Office Visit:                4/8/24 7/27/23 (annual exam)    Future Office visit:           None    Unable to complete prescription refill per RN Medication Refill Policy.     Pt due for annual exam after 7/27/24. Routing to provider for refill consideration. Routing to Unit scheduling pool, to assist Pt in scheduling appointment.     Frieda Hobbs RN .............. 6/3/2024  4:23 PM

## 2024-06-04 RX ORDER — INDOMETHACIN 50 MG/1
CAPSULE ORAL
Qty: 30 CAPSULE | Refills: 2 | Status: SHIPPED | OUTPATIENT
Start: 2024-06-04

## 2024-06-04 NOTE — TELEPHONE ENCOUNTER
Scheduled 7/1 w/ PCP as medicare will accept a wellness exam within the same calendar month of last exam.  Gisell Montiel on 6/4/2024 at 9:27 AM

## 2024-07-01 ENCOUNTER — OFFICE VISIT (OUTPATIENT)
Dept: FAMILY MEDICINE | Facility: OTHER | Age: 79
End: 2024-07-01
Attending: FAMILY MEDICINE
Payer: COMMERCIAL

## 2024-07-01 VITALS
RESPIRATION RATE: 18 BRPM | HEART RATE: 56 BPM | TEMPERATURE: 97.1 F | SYSTOLIC BLOOD PRESSURE: 136 MMHG | BODY MASS INDEX: 35.7 KG/M2 | HEIGHT: 71 IN | DIASTOLIC BLOOD PRESSURE: 80 MMHG | WEIGHT: 255 LBS | OXYGEN SATURATION: 95 %

## 2024-07-01 DIAGNOSIS — Z78.9 DO NOT INTUBATE, CARDIOPULMONARY RESUSCITATION (CPR)-ONLY CODE STATUS: ICD-10-CM

## 2024-07-01 DIAGNOSIS — Z00.00 ENCOUNTER FOR MEDICARE ANNUAL WELLNESS EXAM: Primary | ICD-10-CM

## 2024-07-01 DIAGNOSIS — Z79.891 CHRONIC PRESCRIPTION OPIATE USE: ICD-10-CM

## 2024-07-01 DIAGNOSIS — M54.50 LUMBAR BACK PAIN: ICD-10-CM

## 2024-07-01 DIAGNOSIS — M54.2 NECK PAIN: ICD-10-CM

## 2024-07-01 PROCEDURE — G0463 HOSPITAL OUTPT CLINIC VISIT: HCPCS

## 2024-07-01 PROCEDURE — G0439 PPPS, SUBSEQ VISIT: HCPCS | Performed by: FAMILY MEDICINE

## 2024-07-01 PROCEDURE — 99213 OFFICE O/P EST LOW 20 MIN: CPT | Mod: 25 | Performed by: FAMILY MEDICINE

## 2024-07-01 RX ORDER — HYDROCODONE BITARTRATE AND ACETAMINOPHEN 7.5; 325 MG/1; MG/1
1 TABLET ORAL EVERY 4 HOURS PRN
Qty: 75 TABLET | Refills: 0 | Status: SHIPPED | OUTPATIENT
Start: 2024-07-01

## 2024-07-01 SDOH — HEALTH STABILITY: PHYSICAL HEALTH: ON AVERAGE, HOW MANY DAYS PER WEEK DO YOU ENGAGE IN MODERATE TO STRENUOUS EXERCISE (LIKE A BRISK WALK)?: 0 DAYS

## 2024-07-01 SDOH — HEALTH STABILITY: PHYSICAL HEALTH: ON AVERAGE, HOW MANY MINUTES DO YOU ENGAGE IN EXERCISE AT THIS LEVEL?: 0 MIN

## 2024-07-01 ASSESSMENT — PAIN SCALES - GENERAL: PAINLEVEL: NO PAIN (0)

## 2024-07-01 ASSESSMENT — SOCIAL DETERMINANTS OF HEALTH (SDOH): HOW OFTEN DO YOU GET TOGETHER WITH FRIENDS OR RELATIVES?: ONCE A WEEK

## 2024-07-01 NOTE — PROGRESS NOTES
"Preventive Care Visit  [facility]  Abundio Abdi MD, Family Medicine  Jul 1, 2024      Assessment & Plan     Encounter for Medicare annual wellness exam  Satisfactory    Lumbar back pain  Medications prescribed.   reviewed.  - HYDROcodone-acetaminophen (NORCO) 7.5-325 MG per tablet; Take 1 tablet by mouth every 4 hours as needed for severe pain    Neck pain    - HYDROcodone-acetaminophen (NORCO) 7.5-325 MG per tablet; Take 1 tablet by mouth every 4 hours as needed for severe pain    Chronic prescription opiate use              Nicotine/Tobacco Cessation  He reports that he has been smoking cigarettes. He started smoking about 29 years ago. He has a 25 pack-year smoking history. He has never used smokeless tobacco.  Nicotine/Tobacco Cessation Plan        BMI  Estimated body mass index is 35.57 kg/m  as calculated from the following:    Height as of this encounter: 1.803 m (5' 11\").    Weight as of this encounter: 115.7 kg (255 lb).       Counseling  Appropriate preventive services were discussed with this patient, including applicable screening as appropriate for fall prevention, nutrition, physical activity, Tobacco-use cessation, weight loss and cognition.  Checklist reviewing preventive services available has been given to the patient.  Reviewed patient's diet, addressing concerns and/or questions.   The patient was instructed to see the dentist every 6 months.   The patient was provided with written information regarding signs of hearing loss.   I have reviewed Opioid Use Disorder and Substance Use Disorder risk factors and made any needed referrals. I have reviewed the current pain treatment plan including non-opioid treatment options.          No follow-ups on file.    Maik Adams is a 78 year old, presenting for the following:  Wellness Visit (Annual wellness visit)          Health Care Directive  Patient does not have a Health Care Directive or Living Will: Discussed " advance care planning with patient; information given to patient to review.    HPI    Patient arrives here for Medicare wellness.  He is up-to-date on his laboratory.  Past he has refused colonoscopy.  He is doing well.  Requesting a prescription for hydrocodone.  Uses on a rare basis.  Typically only after mowing the grass or doing strenuous activity.          7/1/2024   General Health   How would you rate your overall physical health? (!) FAIR   Feel stress (tense, anxious, or unable to sleep) Not at all            7/1/2024   Nutrition   Diet: I don't know            7/1/2024   Exercise   Days per week of moderate/strenous exercise 0 days   Average minutes spent exercising at this level 0 min      (!) EXERCISE CONCERN      7/1/2024   Social Factors   Frequency of gathering with friends or relatives Once a week   Worry food won't last until get money to buy more No   Food not last or not have enough money for food? No   Do you have housing? (Housing is defined as stable permanent housing and does not include staying ouside in a car, in a tent, in an abandoned building, in an overnight shelter, or couch-surfing.) Yes   Are you worried about losing your housing? No   Lack of transportation? No   Unable to get utilities (heat,electricity)? No            7/1/2024   Fall Risk   Fallen 2 or more times in the past year? No   Trouble with walking or balance? Yes              7/1/2024   Activities of Daily Living- Home Safety   Needs help with the following daily activites None of the above   Safety concerns in the home None of the above            7/1/2024   Dental   Dentist two times every year? (!) NO            7/1/2024   Hearing Screening   Hearing concerns? (!) IT'S HARDER TO UNDERSTAND WOMEN'S VOICES THAN MEN'S VOICES.    (!) IT'S HARD TO FOLLOW A CONVERSATION IN A NOISY RESTAURANT OR CROWDED ROOM.       Multiple values from one day are sorted in reverse-chronological order         7/1/2024   Driving Risk Screening    Patient/family members have concerns about driving No            2024   General Alertness/Fatigue Screening   Have you been more tired than usual lately? No            2024   Urinary Incontinence Screening   Bothered by leaking urine in past 6 months No            2024   TB Screening   Were you born outside of the [place]? No            Today's PHQ-2 Score:       2024    11:32 AM   PHQ-2 (  [employer])   Q1: Little interest or pleasure in doing things 0   Q2: Feeling down, depressed or hopeless 0   PHQ-2 Score 0   Q1: Little interest or pleasure in doing things Not at all   Q2: Feeling down, depressed or hopeless Not at all   PHQ-2 Score 0           2024   Substance Use   Alcohol more than 3/day or more than 7/wk No   Do you have a current opioid prescription? (!) YES   How severe/bad is pain from 1 to 10? 0/10 (No Pain)   Do you use any other substances recreationally? No            2024    11:42 AM   OPIOID RISK TOOL TOTAL SCORE   Total Score 0     Low Risk (0-3)  Moderate Risk (4-7)  High Risk (>8)  Social History     Tobacco Use    Smoking status: Every Day     Current packs/day: 0.00     Average packs/day: 1 pack/day for 25.0 years (25.0 ttl pk-yrs)     Types: Cigarettes     Start date: 1995     Last attempt to quit: 2020     Years since quittin.3    Smokeless tobacco: Never   Vaping Use    Vaping status: Never Used   Substance Use Topics    Alcohol use: Not Currently     Alcohol/week: 2.0 standard drinks of alcohol     Types: 2 Cans of beer per week     Comment: 2 beer a week    Drug use: No                 Reviewed and updated as needed this visit by Provider                      Current providers sharing in care for this patient include:  Patient Care Team:  Abundio Abdi MD as PCP - General (Family Practice)  Abundio Abdi MD as Assigned PCP    The following health maintenance items are reviewed in Epic and correct as of today:  Health Maintenance   Topic Date Due  "   ZOSTER IMMUNIZATION (1 of 2) Never done    RSV VACCINE (Pregnancy & 60+) (1 - 1-dose 60+ series) Never done    LIPID  07/05/2023    COVID-19 Vaccine (7 - 2023-24 season) 02/10/2024    NICOTINE/TOBACCO CESSATION COUNSELING Q 1 YR  07/27/2024    MEDICARE ANNUAL WELLNESS VISIT  07/27/2024    MICROALBUMIN  07/27/2024    INFLUENZA VACCINE (1) 09/01/2024    BMP  03/13/2025    HEMOGLOBIN  03/13/2025    FALL RISK ASSESSMENT  07/01/2025    GLUCOSE  03/13/2027    ADVANCE CARE PLANNING  10/10/2028    DTAP/TDAP/TD IMMUNIZATION (3 - Td or Tdap) 03/22/2033    PHQ-2 (once per calendar year)  Completed    Pneumococcal Vaccine: 65+ Years  Completed    URINALYSIS  Completed    IPV IMMUNIZATION  Aged Out    HPV IMMUNIZATION  Aged Out    MENINGITIS IMMUNIZATION  Aged Out    RSV MONOCLONAL ANTIBODY  Aged Out            Objective    Exam  /80 (BP Location: Right arm, Patient Position: Sitting, Cuff Size: Adult Large)   Pulse 56   Temp 97.1  F (36.2  C) (Temporal)   Resp 18   Ht 1.803 m (5' 11\")   Wt 115.7 kg (255 lb)   SpO2 95%   BMI 35.57 kg/m     Estimated body mass index is 35.57 kg/m  as calculated from the following:    Height as of this encounter: 1.803 m (5' 11\").    Weight as of this encounter: 115.7 kg (255 lb).    Physical Exam  GENERAL: alert and no distress  NECK: no adenopathy, no asymmetry, masses, or scars  RESP: lungs clear to auscultation - no rales, rhonchi or wheezes  CV: regular rate and rhythm, normal S1 S2, no S3 or S4, no murmur, click or rub, no peripheral edema  ABDOMEN: soft, nontender, no hepatosplenomegaly, no masses and bowel sounds normal  MS: no gross musculoskeletal defects noted, no edema        7/1/2024   Mini Cog   Clock Draw Score 0 Abnormal   3 Item Recall 3 objects recalled   Mini Cog Total Score 3                 Signed Electronically by: Abundio Abdi MD    "

## 2024-07-01 NOTE — NURSING NOTE
Patient is here for annual wellness visit.  Would like to discuss derm problem on left ankle and right foot.  Also would like to discuss leg swelling

## 2024-07-02 ENCOUNTER — TRANSFERRED RECORDS (OUTPATIENT)
Dept: HEALTH INFORMATION MANAGEMENT | Facility: CLINIC | Age: 79
End: 2024-07-02

## 2024-07-10 DIAGNOSIS — M10.00 IDIOPATHIC GOUT, UNSPECIFIED SITE: ICD-10-CM

## 2024-07-15 RX ORDER — ALLOPURINOL 300 MG/1
1 TABLET ORAL DAILY
Qty: 90 TABLET | Refills: 2 | Status: SHIPPED | OUTPATIENT
Start: 2024-07-15

## 2024-07-15 NOTE — TELEPHONE ENCOUNTER
Ingrid GOLDBERG sent Rx request for the following:      Requested Prescriptions   Pending Prescriptions Disp Refills    allopurinol (ZYLOPRIM) 300 MG tablet [Pharmacy Med Name: Allopurinol 300 MG Oral Tablet (Zyloprim)] 90 tablet 2     Sig: Take 1 Tablet by mouth one time a day.       Gout Agents Protocol Failed - 7/10/2024  6:38 AM        Failed - Has Uric Acid on file in past 12 months and value is less than 6     Recent Labs   Lab Test 03/30/21  0930   URIC 9.6*     If level is 6mg/dL or greater, ok to refill one time and refer to provider.           Failed - Has GFR on file in past 12 months and most recent value is normal          Last Prescription Date:   10/23/23  Last Fill Qty/Refills:         90, R-2    Last Office Visit:              7/1/24   Future Office visit:           none    Routing refill request to provider for review/approval because:  Labs not current:  Uric acid/GFR    Marleen Millard RN on 7/15/2024 at 3:46 PM

## 2024-08-21 DIAGNOSIS — E78.00 HYPERCHOLESTEROLEMIA: ICD-10-CM

## 2024-08-22 RX ORDER — ATORVASTATIN CALCIUM 20 MG/1
TABLET, FILM COATED ORAL
Qty: 90 TABLET | Refills: 2 | Status: SHIPPED | OUTPATIENT
Start: 2024-08-22

## 2024-08-22 NOTE — TELEPHONE ENCOUNTER
[facility] sent Rx request for the following:      Requested Prescriptions   Pending Prescriptions Disp Refills    atorvastatin (LIPITOR) 20 MG tablet [Pharmacy Med Name: Atorvastatin Calcium 20 MG Oral Tablet (Lipitor)] 90 tablet 2     Sig: Take 1 Tab by mouth one time a day.       Antihyperlipidemic agents Failed - 8/22/2024  1:19 PM        Failed - LDL on file in the past 12 months     Last Prescription Date:   11/27/23  Last Fill Qty/Refills:         90, R-2    Last Office Visit:              7/1/24 (Physical)   Future Office visit:           None    Unable to complete prescription refill per RN Medication Refill Policy. Frieda Hobbs RN .............. 8/22/2024  1:30 PM

## 2024-09-09 ENCOUNTER — OFFICE VISIT (OUTPATIENT)
Dept: OTOLARYNGOLOGY | Facility: OTHER | Age: 79
End: 2024-09-09
Attending: NURSE PRACTITIONER
Payer: COMMERCIAL

## 2024-09-09 VITALS
TEMPERATURE: 98.1 F | SYSTOLIC BLOOD PRESSURE: 125 MMHG | BODY MASS INDEX: 35.7 KG/M2 | HEIGHT: 71 IN | OXYGEN SATURATION: 90 % | HEART RATE: 61 BPM | DIASTOLIC BLOOD PRESSURE: 76 MMHG | WEIGHT: 255 LBS

## 2024-09-09 DIAGNOSIS — H90.6 MIXED HEARING LOSS, BILATERAL: Primary | ICD-10-CM

## 2024-09-09 PROCEDURE — 92504 EAR MICROSCOPY EXAMINATION: CPT | Performed by: NURSE PRACTITIONER

## 2024-09-09 PROCEDURE — 99212 OFFICE O/P EST SF 10 MIN: CPT | Mod: 25 | Performed by: NURSE PRACTITIONER

## 2024-09-09 PROCEDURE — G0463 HOSPITAL OUTPT CLINIC VISIT: HCPCS

## 2024-09-09 ASSESSMENT — PAIN SCALES - GENERAL: PAINLEVEL: NO PAIN (0)

## 2024-09-09 NOTE — LETTER
9/9/2024      Bryan Kearney  [address] [place] [address]      Dear Colleague,    Thank you for referring your patient, Bryan Kearney, to the [facility]. Please see a copy of my visit note below.      Otolaryngology Note         Chief Complaint:     Patient presents with:  Hearing Problem: HEA hearing loss           History of Present Illness:     Bryan Kearney is a 79 year old male seen today for hearing loss.      He has noted hearing loss for many years  No history of ear surgery  No previous audiograms completed    Had hearing test completed at [facility] and they recocmmend he sees ENT    Partial audiogram completed at [facility] on 7/2/2024 shows left-sided moderate severe sloping to profound mixed hearing loss and right sided severe sloping to profound mixed hearing loss  Word recognition is 64% at 95 dB on the right and 52% at 100 dB on the left  SRT 70 dB bilaterally    He has already received his new hearing aids and has noted significant improvement with the use of his hearing aids.    There have been not been many infections.  The onset was gradual.    There is no otalgia or otorrhea.   No rotary vertigo  No bothersome tinnitus  No facial numbness or tingling.   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    + family history of hearing loss    No prior audiograms         Medications:     Current Outpatient Rx   Medication Sig Dispense Refill     allopurinol (ZYLOPRIM) 300 MG tablet Take 1 Tablet by mouth one time a day. 90 tablet 2     amLODIPine (NORVASC) 10 MG tablet Take 1 Tablet by mouth one time a day. 90 tablet 3     aspirin 325 MG tablet Take 325 mg by mouth daily       atenolol (TENORMIN) 50 MG tablet Take 1 Tablet by mouth one time a day. 90 tablet 3     atorvastatin (LIPITOR) 20 MG tablet Take 1 Tab by mouth one time  a day. 90 tablet 2     Calcium Carbonate-Vitamin D (CALCIUM 500 + D) 500-125 MG-UNIT TABS Take 1 tablet by mouth daily       fluorouracil (EFUDEX) 5 % external cream Apply topically 2 times daily 40 g 1     hydrochlorothiazide (HYDRODIURIL) 25 MG tablet Take 1 tablet (25 mg) by mouth daily 90 tablet 2     HYDROcodone-acetaminophen (NORCO) 7.5-325 MG per tablet Take 1 tablet by mouth every 4 hours as needed for severe pain 75 tablet 0     indomethacin (INDOCIN) 50 MG capsule Take 1 Capsule by mouth three times a day as needed with meals. 30 capsule 2     Multiple Vitamins-Minerals (MULTIVITAMIN GUMMIES ADULT PO) Take 1 chew tab by mouth daily       naloxone (NARCAN) 4 MG/0.1ML nasal spray Spray 1 spray (4 mg) into one nostril alternating nostrils as needed for opioid reversal every 2-3 minutes until assistance arrives 0.2 mL 0     tamsulosin (FLOMAX) 0.4 MG capsule Take 1 capsule (0.4 mg) by mouth daily 90 capsule 3     vitamin B6 (PYRIDOXINE) 100 MG tablet Take 100 mg by mouth daily       zinc gluconate 50 MG tablet Take 50 mg by mouth daily              Allergies:     Allergies: Patient has no known allergies.          Past Medical History:     Past Medical History:   Diagnosis Date     Electrocution     H/o electrocution 13,800 volts     Essential (primary) hypertension     Hypertension     Gastro-esophageal reflux disease without esophagitis     G E R D            Past Surgical History:     Past Surgical History:   Procedure Laterality Date     APPENDECTOMY OPEN      No Comments Provided     ARTHROSCOPY SHOULDER ROTATOR CUFF REPAIR      No Comments Provided     COLONOSCOPY      06/04/2001,Next colonoscopy due in 2011.     OTHER SURGICAL HISTORY      ,CAROTID ENDARDECTOMY,Left carotid endarterectomy secondary to high grade stenosis     OTHER SURGICAL HISTORY      ,CAROTID ENDARDECTOMY,Bilateral endarterectomy            Social History:     Social History     Tobacco Use     Smoking status: Every Day      "Current packs/day: 0.00     Average packs/day: 1 pack/day for 25.0 years (25.0 ttl pk-yrs)     Types: Cigarettes     Start date: 1995     Last attempt to quit: 2020     Years since quittin.5     Smokeless tobacco: Never   Vaping Use     Vaping status: Never Used   Substance Use Topics     Alcohol use: Not Currently     Alcohol/week: 2.0 standard drinks of alcohol     Types: 2 Cans of beer per week     Comment: 2 beer a week     Drug use: No            Review of Systems:     ROS: See HPI         Physical Exam:     /76 (BP Location: Left arm, Patient Position: Sitting, Cuff Size: Adult Large)   Pulse 61   Temp 98.1  F (36.7  C) (Tympanic)   Ht 1.803 m (5' 11\")   Wt 115.7 kg (255 lb)   SpO2 90%   BMI 35.57 kg/m      General - The patient is well nourished and well developed, and appears to have good nutritional status.  Alert and oriented to person and place, answers questions and cooperates with examination appropriately.   Head and Face - Normocephalic and atraumatic, with no gross asymmetry noted.  The facial nerve is intact, with strong symmetric movements.  Voice and Breathing - The patient was breathing comfortably without the use of accessory muscles. There was no wheezing, stridor. The patients voice was clear and strong, and had appropriate pitch and quality.  Ears - External ear normal. Canals are patent.  The ears were examined under binocular microscopy and with otoscope.  Right tympanic membrane is intact without effusion, retraction or mass. Left tympanic membrane is intact without effusion, retraction or mass.  Eyes - Extraocular movements intact, sclera were not icteric or injected, conjunctiva were pink and moist.  Mouth - Examination of the oral cavity showed pink, healthy oral mucosa.   Neck -thick neck and beard somewhat limited exam, however no worrisome palpable lymphadenopathy noted.    Nose - External contour is symmetric, no gross deflection or scars.  Nasal mucosa is " pink and moist with no abnormal mucus.          Assessment and Plan:       ICD-10-CM    1. Mixed hearing loss, bilateral  H90.6         Medically cleared for hearing aids  Follow up as needed     Anaid TRAMMELL  [facility]    Again, thank you for allowing me to participate in the care of your patient.        Sincerely,        Anaid Chapa, NP

## 2024-09-09 NOTE — PROGRESS NOTES
Otolaryngology Note         Chief Complaint:     Patient presents with:  Hearing Problem: HEA hearing loss           History of Present Illness:     Bryan Kearney is a 79 year old male seen today for hearing loss.      He has noted hearing loss for many years  No history of ear surgery  No previous audiograms completed    Had hearing test completed at [facility] and they recocmmend he sees ENT    Partial audiogram completed at [facility] on 7/2/2024 shows left-sided moderate severe sloping to profound mixed hearing loss and right sided severe sloping to profound mixed hearing loss  Word recognition is 64% at 95 dB on the right and 52% at 100 dB on the left  SRT 70 dB bilaterally    He has already received his new hearing aids and has noted significant improvement with the use of his hearing aids.    There have been not been many infections.  The onset was gradual.    There is no otalgia or otorrhea.   No rotary vertigo  No bothersome tinnitus  No facial numbness or tingling.   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    + family history of hearing loss    No prior audiograms         Medications:     Current Outpatient Rx   Medication Sig Dispense Refill    allopurinol (ZYLOPRIM) 300 MG tablet Take 1 Tablet by mouth one time a day. 90 tablet 2    amLODIPine (NORVASC) 10 MG tablet Take 1 Tablet by mouth one time a day. 90 tablet 3    aspirin 325 MG tablet Take 325 mg by mouth daily      atenolol (TENORMIN) 50 MG tablet Take 1 Tablet by mouth one time a day. 90 tablet 3    atorvastatin (LIPITOR) 20 MG tablet Take 1 Tab by mouth one time a day. 90 tablet 2    Calcium Carbonate-Vitamin D (CALCIUM 500 + D) 500-125 MG-UNIT TABS Take 1 tablet by mouth daily      fluorouracil (EFUDEX) 5 % external cream Apply topically 2 times daily 40 g 1    hydrochlorothiazide (HYDRODIURIL) 25 MG tablet Take  1 tablet (25 mg) by mouth daily 90 tablet 2    HYDROcodone-acetaminophen (NORCO) 7.5-325 MG per tablet Take 1 tablet by mouth every 4 hours as needed for severe pain 75 tablet 0    indomethacin (INDOCIN) 50 MG capsule Take 1 Capsule by mouth three times a day as needed with meals. 30 capsule 2    Multiple Vitamins-Minerals (MULTIVITAMIN GUMMIES ADULT PO) Take 1 chew tab by mouth daily      naloxone (NARCAN) 4 MG/0.1ML nasal spray Spray 1 spray (4 mg) into one nostril alternating nostrils as needed for opioid reversal every 2-3 minutes until assistance arrives 0.2 mL 0    tamsulosin (FLOMAX) 0.4 MG capsule Take 1 capsule (0.4 mg) by mouth daily 90 capsule 3    vitamin B6 (PYRIDOXINE) 100 MG tablet Take 100 mg by mouth daily      zinc gluconate 50 MG tablet Take 50 mg by mouth daily              Allergies:     Allergies: Patient has no known allergies.          Past Medical History:     Past Medical History:   Diagnosis Date    Electrocution     H/o electrocution 13,800 volts    Essential (primary) hypertension     Hypertension    Gastro-esophageal reflux disease without esophagitis     G E R D            Past Surgical History:     Past Surgical History:   Procedure Laterality Date    APPENDECTOMY OPEN      No Comments Provided    ARTHROSCOPY SHOULDER ROTATOR CUFF REPAIR      No Comments Provided    COLONOSCOPY      2001,Next colonoscopy due in .    OTHER SURGICAL HISTORY      ,CAROTID ENDARDECTOMY,Left carotid endarterectomy secondary to high grade stenosis    OTHER SURGICAL HISTORY      ,CAROTID ENDARDECTOMY,Bilateral endarterectomy            Social History:     Social History     Tobacco Use    Smoking status: Every Day     Current packs/day: 0.00     Average packs/day: 1 pack/day for 25.0 years (25.0 ttl pk-yrs)     Types: Cigarettes     Start date: 1995     Last attempt to quit: 2020     Years since quittin.5    Smokeless tobacco: Never   Vaping Use    Vaping status: Never Used  "  Substance Use Topics    Alcohol use: Not Currently     Alcohol/week: 2.0 standard drinks of alcohol     Types: 2 Cans of beer per week     Comment: 2 beer a week    Drug use: No            Review of Systems:     ROS: See HPI         Physical Exam:     /76 (BP Location: Left arm, Patient Position: Sitting, Cuff Size: Adult Large)   Pulse 61   Temp 98.1  F (36.7  C) (Tympanic)   Ht 1.803 m (5' 11\")   Wt 115.7 kg (255 lb)   SpO2 90%   BMI 35.57 kg/m      General - The patient is well nourished and well developed, and appears to have good nutritional status.  Alert and oriented to person and place, answers questions and cooperates with examination appropriately.   Head and Face - Normocephalic and atraumatic, with no gross asymmetry noted.  The facial nerve is intact, with strong symmetric movements.  Voice and Breathing - The patient was breathing comfortably without the use of accessory muscles. There was no wheezing, stridor. The patients voice was clear and strong, and had appropriate pitch and quality.  Ears - External ear normal. Canals are patent.  The ears were examined under binocular microscopy and with otoscope.  Right tympanic membrane is intact without effusion, retraction or mass. Left tympanic membrane is intact without effusion, retraction or mass.  Eyes - Extraocular movements intact, sclera were not icteric or injected, conjunctiva were pink and moist.  Mouth - Examination of the oral cavity showed pink, healthy oral mucosa.   Neck -thick neck and beard somewhat limited exam, however no worrisome palpable lymphadenopathy noted.    Nose - External contour is symmetric, no gross deflection or scars.  Nasal mucosa is pink and moist with no abnormal mucus.          Assessment and Plan:       ICD-10-CM    1. Mixed hearing loss, bilateral  H90.6         Medically cleared for hearing aids  Follow up as needed     Anaid Chapa NP-C  [facility]    "

## 2024-09-09 NOTE — PATIENT INSTRUCTIONS
Thank you for allowing Anaid Chapa and our ENT team to participate in your care.  If your medications are too expensive, please give the nurse a call.  We can possibly change this medication.  If you have a scheduling or an appointment question please contact our Health Unit Coordinator at their direct line [phone] ext [phone].   ALL nursing questions or concerns can be directed to your ENT nurse at: [phone] - ann     Medically cleared for hearing aids  Follow up as needed

## 2024-10-10 DIAGNOSIS — R35.0 BENIGN PROSTATIC HYPERPLASIA WITH URINARY FREQUENCY: ICD-10-CM

## 2024-10-10 DIAGNOSIS — N40.1 BENIGN PROSTATIC HYPERPLASIA WITH URINARY FREQUENCY: ICD-10-CM

## 2024-10-10 RX ORDER — TAMSULOSIN HYDROCHLORIDE 0.4 MG/1
0.4 CAPSULE ORAL DAILY
Qty: 90 CAPSULE | Refills: 2 | Status: SHIPPED | OUTPATIENT
Start: 2024-10-10

## 2024-10-10 NOTE — TELEPHONE ENCOUNTER
[facility] sent Rx request for the following:      Requested Prescriptions   Pending Prescriptions Disp Refills    tamsulosin (FLOMAX) 0.4 MG capsule [Pharmacy Med Name: Tamsulosin HCl 0.4 MG Oral Capsule (Flomax)] 90 capsule 3     Sig: Take 1 Capsule by mouth one time a day.       Alpha Blockers Failed - 10/10/2024 11:10 AM        Failed - Medication indicated for associated diagnosis     Medication is associated with one or more of the following diagnoses:  Benign prostatic hyperplasia  Disorder of the urinary system  Erectile dysfunction  Neurogenic bladder  Overactive bladder  Raynaud s  Ureteral stent-related symptoms  Urinary retention  Posttraumatic Stress Disorder     Last Prescription Date:   10/23/23  Last Fill Qty/Refills:         90, R-3    Last Office Visit:              7/1/24 (Px)   Future Office visit:           None    Unable to complete prescription refill per RN Medication Refill Policy. Frieda Hobbs RN .............. 10/10/2024  11:12 AM

## 2024-11-18 DIAGNOSIS — I10 PRIMARY HYPERTENSION: ICD-10-CM

## 2024-11-18 DIAGNOSIS — I10 ESSENTIAL HYPERTENSION: ICD-10-CM

## 2024-11-18 RX ORDER — ATENOLOL 50 MG/1
TABLET ORAL
Qty: 90 TABLET | Refills: 0 | Status: SHIPPED | OUTPATIENT
Start: 2024-11-18

## 2024-11-18 RX ORDER — AMLODIPINE BESYLATE 10 MG/1
10 TABLET ORAL DAILY
Qty: 90 TABLET | Refills: 0 | Status: SHIPPED | OUTPATIENT
Start: 2024-11-18

## 2024-11-18 NOTE — TELEPHONE ENCOUNTER
[facility] sent Rx request for the following:      Requested Prescriptions   Pending Prescriptions Disp Refills    atenolol (TENORMIN) 50 MG tablet [Pharmacy Med Name: Atenolol 50 MG Oral Tablet (Tenormin)] 90 tablet 3     Sig: Take 1 Tablet by mouth one time a day.       Beta-Blockers Protocol Passed - 11/18/2024  4:07 PM        Passed - Most recent blood pressure under 140/90 in past 12 months     BP Readings from Last 3 Encounters:   09/09/24 125/76   07/01/24 136/80   04/08/24 122/64       No data recorded            Passed - Patient is age 6 or older        Passed - Medication is active on med list        Passed - Medication indicated for associated diagnosis     Medication is associated with one or more of the following diagnoses:     Hypertension (HTN)   Atrial fibrillation/flutter   Angina   ASCVD   Migraine   Heart Failure   Tremor   Anxiety   Ocular hypertension   Glaucoma   PTSD   Obstructive hypertrophic cardiomyopathy   History of myocarditis   Palpitations   POTS (postural orthostatic tachycardia syndrome)   SVT (supraventricular tachycardia)   Hyperthyroidism   Tachycardia   Acute non-st segment elevation myocardial infarction   Subsequent non-st segment elevation myocardial infarction   Ischemic myocardial dysfunction          Passed - Recent (12 mo) or future (90 days) visit within the authorizing provider's specialty     The patient must have completed an in-person or virtual visit within the past 12 months or has a future visit scheduled within the next 90 days with the authorizing provider s specialty.  Urgent care and e-visits do not qualify as an office visit for this protocol.        Last Prescription Date:   11/17/23  Last Fill Qty/Refills:         90, R-3            amLODIPine (NORVASC) 10 MG tablet [Pharmacy Med Name: amLODIPine Besylate 10 MG Oral Tablet (Norvasc)] 90 tablet 3     Sig: Take 1 Tablet by mouth one time a day.       Calcium Channel Blockers Protocol  Failed  - 11/18/2024  4:07 PM        Failed - GFR is on file in the past 12 months and most recent GFR is normal        Passed - Most recent blood pressure under 140/90 in past 12 months     BP Readings from Last 3 Encounters:   09/09/24 125/76   07/01/24 136/80   04/08/24 122/64       No data recorded            Passed - Medication is active on med list        Passed - Recent (12 mo) or future (90 days) visit within the authorizing provider's specialty     The patient must have completed an in-person or virtual visit within the past 12 months or has a future visit scheduled within the next 90 days with the authorizing provider s specialty.  Urgent care and e-visits do not qualify as an office visit for this protocol.          Passed - Patient is age 18 or older             Last Prescription Date:   11/16/23  Last Fill Qty/Refills:         90, R-3    Last Office Visit:              11/3/22 (last discussed)  Future Office visit:            None    Unable to complete prescription refill per RN Medication Refill Policy.     Bi Cunha RN on 11/18/2024 at 4:10 PM

## 2024-12-23 ENCOUNTER — NURSE TRIAGE (OUTPATIENT)
Dept: FAMILY MEDICINE | Facility: OTHER | Age: 79
End: 2024-12-23
Payer: COMMERCIAL

## 2024-12-23 ENCOUNTER — OFFICE VISIT (OUTPATIENT)
Dept: FAMILY MEDICINE | Facility: OTHER | Age: 79
End: 2024-12-23
Attending: FAMILY MEDICINE
Payer: COMMERCIAL

## 2024-12-23 ENCOUNTER — HOSPITAL ENCOUNTER (EMERGENCY)
Facility: OTHER | Age: 79
Discharge: HOME | End: 2024-12-23
Attending: FAMILY MEDICINE
Payer: COMMERCIAL

## 2024-12-23 ENCOUNTER — APPOINTMENT (OUTPATIENT)
Dept: GENERAL RADIOLOGY | Facility: OTHER | Age: 79
End: 2024-12-23
Attending: FAMILY MEDICINE
Payer: COMMERCIAL

## 2024-12-23 VITALS
TEMPERATURE: 97.2 F | BODY MASS INDEX: 35 KG/M2 | SYSTOLIC BLOOD PRESSURE: 152 MMHG | RESPIRATION RATE: 14 BRPM | WEIGHT: 250 LBS | HEIGHT: 71 IN | DIASTOLIC BLOOD PRESSURE: 85 MMHG | OXYGEN SATURATION: 96 % | HEART RATE: 61 BPM

## 2024-12-23 VITALS
DIASTOLIC BLOOD PRESSURE: 64 MMHG | BODY MASS INDEX: 34.98 KG/M2 | RESPIRATION RATE: 18 BRPM | WEIGHT: 250.8 LBS | TEMPERATURE: 98.6 F | OXYGEN SATURATION: 96 % | HEART RATE: 61 BPM | SYSTOLIC BLOOD PRESSURE: 130 MMHG

## 2024-12-23 DIAGNOSIS — R07.9 CHEST PAIN, UNSPECIFIED TYPE: Primary | ICD-10-CM

## 2024-12-23 DIAGNOSIS — R07.89 OTHER CHEST PAIN: Primary | ICD-10-CM

## 2024-12-23 PROBLEM — I44.7 LBBB (LEFT BUNDLE BRANCH BLOCK): Status: ACTIVE | Noted: 2024-12-23

## 2024-12-23 LAB
ALBUMIN SERPL BCG-MCNC: 4.3 G/DL (ref 3.5–5.2)
ALP SERPL-CCNC: 82 U/L (ref 40–150)
ALT SERPL W P-5'-P-CCNC: 29 U/L (ref 0–70)
ANION GAP SERPL CALCULATED.3IONS-SCNC: 10 MMOL/L (ref 7–15)
AST SERPL W P-5'-P-CCNC: 24 U/L (ref 0–45)
BASOPHILS # BLD AUTO: 0 10E3/UL (ref 0–0.2)
BASOPHILS NFR BLD AUTO: 1 %
BILIRUB SERPL-MCNC: 0.6 MG/DL
BUN SERPL-MCNC: 24.6 MG/DL (ref 8–23)
CALCIUM SERPL-MCNC: 10 MG/DL (ref 8.8–10.4)
CHLORIDE SERPL-SCNC: 104 MMOL/L (ref 98–107)
CREAT SERPL-MCNC: 1.25 MG/DL (ref 0.67–1.17)
CRP SERPL-MCNC: <3 MG/L
D DIMER PPP FEU-MCNC: 0.65 UG/ML FEU (ref 0–0.5)
EGFRCR SERPLBLD CKD-EPI 2021: 58 ML/MIN/1.73M2
EOSINOPHIL # BLD AUTO: 0.4 10E3/UL (ref 0–0.7)
EOSINOPHIL NFR BLD AUTO: 5 %
ERYTHROCYTE [DISTWIDTH] IN BLOOD BY AUTOMATED COUNT: 13 % (ref 10–15)
FLUAV RNA SPEC QL NAA+PROBE: NEGATIVE
FLUBV RNA RESP QL NAA+PROBE: NEGATIVE
GLUCOSE SERPL-MCNC: 110 MG/DL (ref 70–99)
HCO3 SERPL-SCNC: 27 MMOL/L (ref 22–29)
HCT VFR BLD AUTO: 48.7 % (ref 40–53)
HGB BLD-MCNC: 17 G/DL (ref 13.3–17.7)
HOLD SPECIMEN: NORMAL
IMM GRANULOCYTES # BLD: 0 10E3/UL
IMM GRANULOCYTES NFR BLD: 0 %
LYMPHOCYTES # BLD AUTO: 2.5 10E3/UL (ref 0.8–5.3)
LYMPHOCYTES NFR BLD AUTO: 33 %
MCH RBC QN AUTO: 32.6 PG (ref 26.5–33)
MCHC RBC AUTO-ENTMCNC: 34.9 G/DL (ref 31.5–36.5)
MCV RBC AUTO: 94 FL (ref 78–100)
MONOCYTES # BLD AUTO: 0.6 10E3/UL (ref 0–1.3)
MONOCYTES NFR BLD AUTO: 8 %
NEUTROPHILS # BLD AUTO: 4.1 10E3/UL (ref 1.6–8.3)
NEUTROPHILS NFR BLD AUTO: 54 %
NRBC # BLD AUTO: 0 10E3/UL
NRBC BLD AUTO-RTO: 0 /100
NT-PROBNP SERPL-MCNC: 204 PG/ML (ref 0–1800)
PLATELET # BLD AUTO: 229 10E3/UL (ref 150–450)
POTASSIUM SERPL-SCNC: 3.9 MMOL/L (ref 3.4–5.3)
PROCALCITONIN SERPL IA-MCNC: 0.05 NG/ML
PROT SERPL-MCNC: 7.3 G/DL (ref 6.4–8.3)
RBC # BLD AUTO: 5.21 10E6/UL (ref 4.4–5.9)
RSV RNA SPEC NAA+PROBE: NEGATIVE
SARS-COV-2 RNA RESP QL NAA+PROBE: NEGATIVE
SODIUM SERPL-SCNC: 141 MMOL/L (ref 135–145)
TROPONIN T SERPL HS-MCNC: 23 NG/L
TROPONIN T SERPL HS-MCNC: 24 NG/L
WBC # BLD AUTO: 7.7 10E3/UL (ref 4–11)

## 2024-12-23 PROCEDURE — 93010 ELECTROCARDIOGRAM REPORT: CPT | Performed by: INTERNAL MEDICINE

## 2024-12-23 PROCEDURE — 85014 HEMATOCRIT: CPT | Performed by: FAMILY MEDICINE

## 2024-12-23 PROCEDURE — 83880 ASSAY OF NATRIURETIC PEPTIDE: CPT | Performed by: FAMILY MEDICINE

## 2024-12-23 PROCEDURE — 84484 ASSAY OF TROPONIN QUANT: CPT | Performed by: FAMILY MEDICINE

## 2024-12-23 PROCEDURE — 71046 X-RAY EXAM CHEST 2 VIEWS: CPT

## 2024-12-23 PROCEDURE — 250N000013 HC RX MED GY IP 250 OP 250 PS 637: Performed by: FAMILY MEDICINE

## 2024-12-23 PROCEDURE — 99283 EMERGENCY DEPT VISIT LOW MDM: CPT | Performed by: FAMILY MEDICINE

## 2024-12-23 PROCEDURE — 93005 ELECTROCARDIOGRAM TRACING: CPT | Performed by: FAMILY MEDICINE

## 2024-12-23 PROCEDURE — 87637 SARSCOV2&INF A&B&RSV AMP PRB: CPT | Performed by: FAMILY MEDICINE

## 2024-12-23 PROCEDURE — 85004 AUTOMATED DIFF WBC COUNT: CPT | Performed by: FAMILY MEDICINE

## 2024-12-23 PROCEDURE — 36415 COLL VENOUS BLD VENIPUNCTURE: CPT | Performed by: FAMILY MEDICINE

## 2024-12-23 PROCEDURE — 85379 FIBRIN DEGRADATION QUANT: CPT | Performed by: FAMILY MEDICINE

## 2024-12-23 PROCEDURE — 99214 OFFICE O/P EST MOD 30 MIN: CPT | Performed by: FAMILY MEDICINE

## 2024-12-23 PROCEDURE — 80053 COMPREHEN METABOLIC PANEL: CPT | Performed by: FAMILY MEDICINE

## 2024-12-23 PROCEDURE — 99285 EMERGENCY DEPT VISIT HI MDM: CPT | Mod: 25 | Performed by: FAMILY MEDICINE

## 2024-12-23 PROCEDURE — 86140 C-REACTIVE PROTEIN: CPT | Performed by: FAMILY MEDICINE

## 2024-12-23 PROCEDURE — G0463 HOSPITAL OUTPT CLINIC VISIT: HCPCS

## 2024-12-23 PROCEDURE — 84145 PROCALCITONIN (PCT): CPT | Performed by: FAMILY MEDICINE

## 2024-12-23 RX ORDER — ASPIRIN 81 MG/1
162 TABLET, CHEWABLE ORAL ONCE
Status: COMPLETED | OUTPATIENT
Start: 2024-12-23 | End: 2024-12-23

## 2024-12-23 RX ADMIN — ASPIRIN 81 MG 162 MG: 81 TABLET ORAL at 10:36

## 2024-12-23 ASSESSMENT — ACTIVITIES OF DAILY LIVING (ADL)
ADLS_ACUITY_SCORE: 41

## 2024-12-23 ASSESSMENT — COLUMBIA-SUICIDE SEVERITY RATING SCALE - C-SSRS
1. IN THE PAST MONTH, HAVE YOU WISHED YOU WERE DEAD OR WISHED YOU COULD GO TO SLEEP AND NOT WAKE UP?: NO
2. HAVE YOU ACTUALLY HAD ANY THOUGHTS OF KILLING YOURSELF IN THE PAST MONTH?: NO
6. HAVE YOU EVER DONE ANYTHING, STARTED TO DO ANYTHING, OR PREPARED TO DO ANYTHING TO END YOUR LIFE?: NO

## 2024-12-23 ASSESSMENT — PAIN SCALES - GENERAL: PAINLEVEL_OUTOF10: SEVERE PAIN (7)

## 2024-12-23 NOTE — ED PROVIDER NOTES
"[facility]  Emergency Department Visit Note    Chest Pain      History of Present Illness     HPI:  Bryan Kearney is a 79 year old male presenting with right-sided chest pain for the past 2 days.  It is relieved with lying down and worse with sitting up.  It seems to be almost constant.  He feels somewhat short of breath with it as well.  He is a longtime smoker and continues to smoke but he has not had as many cigarettes over the past 2 days for feeling unwell.  He has been coughing up sputum but not necessarily more than his usual amount or more purulent sputum.  He denies fevers or chills.  He denies swelling.  He denies orthopnea.  He does not do much for physical activity during the day or go for walks.  He is not sure if it is exertional. Risk factors including smoking, htn, hld, ckd stage 3, LBBB history.      Review of Systems:  10 point review of systems obtained and pertinent positive and negative findings noted in HPI. Review of systems otherwise negative.  Medications:  Reviewed in Epic Allergies:  Reviewed in Epic Problem List:  Reviewed in Epic   Past Medical History:  Reviewed in Epic Past Surgical History:  Reviewed in Epic Social History:  Reviewed in Epic       Physical Exam   Vital signs: BP (!) 159/83   Pulse 61   Temp 97.2  F (36.2  C) (Tympanic)   Resp 14   Ht 1.803 m (5' 11\")   Wt 113.4 kg (250 lb)   SpO2 98%   BMI 34.87 kg/m    Physical Exam  Constitutional:       General: He is not in acute distress.     Appearance: He is obese. He is not ill-appearing.   Eyes:      Extraocular Movements: Extraocular movements intact.      Pupils: Pupils are equal, round, and reactive to light.   Cardiovascular:      Rate and Rhythm: Normal rate and regular rhythm.      Heart sounds: Murmur heard.      Systolic murmur is present.   Pulmonary:      Effort: Pulmonary effort is normal.      Breath sounds: Normal breath sounds.   Chest:      Chest wall: No tenderness or edema. "   Abdominal:      Palpations: Abdomen is soft.      Tenderness: There is no abdominal tenderness.   Musculoskeletal:      Right lower leg: No edema.      Left lower leg: No edema.   Skin:     General: Skin is warm and dry.      Capillary Refill: Capillary refill takes less than 2 seconds.      Coloration: Skin is not cyanotic.      Findings: No erythema.      Nails: There is no clubbing.   Neurological:      General: No focal deficit present.      Mental Status: He is alert and oriented to person, place, and time.           ED Course     ED Course as of 12/23/24 1243   Mon Dec 23, 2024   1027 EKG showed LBBB which is stable from at least 2020, and this makes ischemic interpretation unreliable.  62 bpm with sinus rhythm and 1st degree AV block.  Left axis deviation with -33 degree axis.     Procedures                  Results for orders placed or performed during the hospital encounter of 12/23/24 (from the past 24 hours)   [place] Draw    Narrative    The following orders were created for panel order [place] Draw.  Procedure                               Abnormality         Status                     ---------                               -----------         ------                     Extra Blue Top Tube[[id number]]                              Final result               Extra Red Top Tube[[id number]]                               Final result               Extra Green Top (Lithium...[[id number]]                      Final result               Extra Green Top (Lithium...[[id number]]                      Final result               Extra Purple Top Tube[[id number]]                            Final result                 Please view results for these tests on the individual orders.   Extra Blue Top Tube   Result Value Ref Range    Hold Specimen JIC    Extra Red Top Tube   Result Value Ref Range    Hold Specimen JIC    Extra Green Top (Lithium Heparin) Tube   Result Value Ref Range    Hold Specimen JIC    Extra Green Top  (Lithium Heparin) Tube   Result Value Ref Range    Hold Specimen JIC    Extra Purple Top Tube   Result Value Ref Range    Hold Specimen JIC    CBC with platelets differential    Narrative    The following orders were created for panel order CBC with platelets differential.  Procedure                               Abnormality         Status                     ---------                               -----------         ------                     CBC with platelets and d...[[id number]]                      Final result                 Please view results for these tests on the individual orders.   D dimer quantitative   Result Value Ref Range    D-Dimer Quantitative 0.65 (H) 0.00 - 0.50 ug/mL FEU    Narrative    This D-dimer assay is intended for use in conjunction with a clinical pretest probability assessment model to exclude pulmonary embolism (PE) and deep venous thrombosis (DVT) in outpatients suspected of PE or DVT. The cut-off value is 0.50 ug/mL FEU.    For patients 50 years of age or older, the application of age-adjusted cut-off values for D-Dimer may increase the specificity without significant effect on sensitivity. The literature suggested calculation age adjusted cut-off in ug/L = age in years x 10 ug/L. The results in this laboratory are reported as ug/mL rather than ug/L. The calculation for age adjusted cut off in ug/mL= age in years x 0.01 ug/mL. For example, the cut off for a 76 year old male is 76 x 0.01 ug/mL = 0.76 ug/mL (760 ug/L).    M Adriana et al. Age adjusted D-dimer cut-off levels to rule out pulmonary embolism: The ADJUST-PE Study. EVELYN 2014;311:6199-9208.; MARILUZ Ga et al. Diagnostic accuracy of conventional or age adjusted D-dimer cutoff values in older patients with suspected venous thromboembolism. Systemic review and meta-analysis. BMJ 2013:346:f2492.   Comprehensive metabolic panel   Result Value Ref Range    Sodium 141 135 - 145 mmol/L    Potassium 3.9 3.4 - 5.3 mmol/L    Carbon  Dioxide (CO2) 27 22 - 29 mmol/L    Anion Gap 10 7 - 15 mmol/L    Urea Nitrogen 24.6 (H) 8.0 - 23.0 mg/dL    Creatinine 1.25 (H) 0.67 - 1.17 mg/dL    GFR Estimate 58 (L) >60 mL/min/1.73m2    Calcium 10.0 8.8 - 10.4 mg/dL    Chloride 104 98 - 107 mmol/L    Glucose 110 (H) 70 - 99 mg/dL    Alkaline Phosphatase 82 40 - 150 U/L    AST 24 0 - 45 U/L    ALT 29 0 - 70 U/L    Protein Total 7.3 6.4 - 8.3 g/dL    Albumin 4.3 3.5 - 5.2 g/dL    Bilirubin Total 0.6 <=1.2 mg/dL   Procalcitonin   Result Value Ref Range    Procalcitonin 0.05 <0.50 ng/mL   Troponin T, High Sensitivity   Result Value Ref Range    Troponin T, High Sensitivity 24 (H) <=22 ng/L   Nt probnp inpatient (BNP)   Result Value Ref Range    N terminal Pro BNP Inpatient 204 0 - 1,800 pg/mL   CBC with platelets and differential   Result Value Ref Range    WBC Count 7.7 4.0 - 11.0 10e3/uL    RBC Count 5.21 4.40 - 5.90 10e6/uL    Hemoglobin 17.0 13.3 - 17.7 g/dL    Hematocrit 48.7 40.0 - 53.0 %    MCV 94 78 - 100 fL    MCH 32.6 26.5 - 33.0 pg    MCHC 34.9 31.5 - 36.5 g/dL    RDW 13.0 10.0 - 15.0 %    Platelet Count 229 150 - 450 10e3/uL    % Neutrophils 54 %    % Lymphocytes 33 %    % Monocytes 8 %    % Eosinophils 5 %    % Basophils 1 %    % Immature Granulocytes 0 %    NRBCs per 100 WBC 0 <1 /100    Absolute Neutrophils 4.1 1.6 - 8.3 10e3/uL    Absolute Lymphocytes 2.5 0.8 - 5.3 10e3/uL    Absolute Monocytes 0.6 0.0 - 1.3 10e3/uL    Absolute Eosinophils 0.4 0.0 - 0.7 10e3/uL    Absolute Basophils 0.0 0.0 - 0.2 10e3/uL    Absolute Immature Granulocytes 0.0 <=0.4 10e3/uL    Absolute NRBCs 0.0 10e3/uL   CRP inflammation   Result Value Ref Range    CRP Inflammation <3.00 <5.00 mg/L   Influenza A/B, RSV and SARS-CoV2 PCR (COVID-19) Nose    Specimen: Nose; Swab   Result Value Ref Range    Influenza A PCR Negative Negative    Influenza B PCR Negative Negative    RSV PCR Negative Negative    SARS CoV2 PCR Negative Negative    Narrative    Testing was performed using the  Xpert Xpress CoV2/Flu/RSV Assay on the [employer] GeneXpert Instrument. This test should be ordered for the detection of SARS-CoV2, influenza, and RSV viruses in individuals with signs and symptoms of respiratory tract infection. This test is for in vitro diagnostic use under the [place] FDA for laboratories certified under CLIA to perform high or moderate complexity testing. This test has been [place] FDA cleared. A negative result does not rule out the presence of PCR inhibitors in the specimen or target RNA in concentration below the limit of detection for the assay. If only one viral target is positive but coinfection with multiple targets is suspected, the sample should be re-tested with another FDA cleared, approved, or authorized test, if coninfection would change clinical management. This test was validated by the [facility]. These laboratories are certified under the Clinical Laboratory Improvement Amendments of 1988 (CLIA-88) as qualified to perfom high complexity laboratory testing.   Troponin T, High Sensitivity   Result Value Ref Range    Troponin T, High Sensitivity 23 (H) <=22 ng/L   XR Chest 2 Views    Narrative    Procedure:XR CHEST 2 VIEWS    Clinical history:Male, 79 years, chest pain    Technique: Two views are submitted.    Comparison: No relevant prior imaging.    Findings: The cardiac silhouette is within normal limits.    The lungs are clear. Bony structures are unremarkable.      Impression    Impression:   No acute abnormality. No evidence of acute or active cardiopulmonary  disease.    ZOE BLAKELY MD         SYSTEM ID:  [id number]       Medications   aspirin (ASA) chewable tablet 162 mg (162 mg Oral $Given 12/23/24 1036)       Medical Decision Making     Bryan Kearney is a 79 year old male presenting with right sided chest pain and arm pain.  Cardiac and pulmonary work-up was negative which is what his clinic doctor was concerned about. On further history, he was snow blowing  with a heavy old  and manually maneuvered it.  After that he has been having right sided shoulder, arm and chest pain and thinks he strained this area.  He did accept a PT referral.     Patient given instructions on follow-up and warning signs for which to return to ED. All questions were answered and the patient is comfortable with plan for discharge. The patient was discharged in stable condition or transferred in as stable condition as possible.    I have reviewed the patient's Medical Imaging and Medical Records.  I have reviewed the nursing notes.  I have reviewed the findings, diagnosis, plan and need for follow up with the patient.    Diagnosis & Disposition     Diagnosis:  1. Chest pain, unspecified type        Discharge disposition:  Discharged to home       Follow-up: Follow up with primary care provider in 14 days       [facility] Emergency Department  Jayda Sepulveda MD  Family Medicine     Jayda Sepulveda MD  12/23/24 [id number]

## 2024-12-23 NOTE — ED TRIAGE NOTES
"[facility] from [facility] endorsing 6/10 right chest pain that radiates to right shoulder. The pain started on Saturday and is constant unless he is lying flat. Denies SOB, dizziness, or lightheadedness. Denies cardiac Hx. Wife at bedside. BP (!) 167/93   Pulse 66   Temp 97.2  F (36.2  C) (Tympanic)   Resp 16   Ht 1.803 m (5' 11\")   Wt 113.4 kg (250 lb)   SpO2 94%   BMI 34.87 kg/m       Triage Assessment (Adult)       Row Name 12/23/24 0946          Triage Assessment    Airway WDL WDL        Respiratory WDL    Respiratory WDL WDL        Cardiac WDL    Cardiac WDL X;chest pain        Chest Pain Assessment    Chest Pain Location anterior chest, right     Chest Pain Radiation shoulder     Character pressure     Duration Started on Saturday     Alleviating Factors rest     Chest Pain Intervention cardiac monitor placed;12-lead ECG obtained        Cognitive/Neuro/Behavioral WDL    Cognitive/Neuro/Behavioral WDL WDL                     "

## 2024-12-23 NOTE — TELEPHONE ENCOUNTER
S-(situation): Patient has Right sided chest arm pain.    B-(background): Patient was snow blowing on Saturday, patient has had Right sided chest pain since. Pain is in shoulder and does rubn down arm. Patient was out snow blowing. Patient has no cardiac history.    A-(assessment): Possible arm injury or nerve issue    R-(recommendations): Will speak to provider, appointment made for today.    Marleen Millard RN on 12/23/2024 at 7:45 AM

## 2024-12-23 NOTE — PROGRESS NOTES
"  Assessment & Plan     Other chest pain  Right-sided chest pain in a high cardiac risk patient.  Ultimately, this may end up being more musculoskeletal with his underlying neck pathology, but with his high risk status, persistent chest pain radiating to the back and right arm, and some pleuritic nature of the chest pain, I feel he needs urgent evaluation to rule out heart and lung pathology.  I have called over to the emergency room, spoken with the MD on duty, and we have wheeled him over there for evaluation.  Patient and wife given full discussion on recommendations and they are okay with the plan.          BMI  Estimated body mass index is 34.98 kg/m  as calculated from the following:    Height as of 9/9/24: 1.803 m (5' 11\").    Weight as of this encounter: 113.8 kg (250 lb 12.8 oz).             No follow-ups on file.    Maik Adams is a 79 year old, presenting for the following health issues:  Shoulder (Right shoulder, right side of chest right upper back   has relief when he lays down on back )         No data to display              History of Present Illness       Reason for visit:  Right shoulder, chest and back pain  Symptom onset:  1-3 days ago  Symptoms include:  Sharp pain   steady pain  Symptom intensity:  Severe  Symptom progression:  Staying the same  Had these symptoms before:  Yes  Has tried/received treatment for these symptoms:  No  What makes it worse:  No  What makes it better:  Laying flat on back pain goes away   He is taking medications regularly.     79-year-old male here for right sided chest pain.  This was subacute onset 2 days ago.  This has been right mid and lateral chest pain that feels like there is some \"lead\" in it, 6-7/10, constant with some radiation to right neck, straight through to the right upper back, and down the right arm at times.  No radiation over the left side of the chest or left arm.  He does have dyspnea with it off and on.  He does have pleuritic nature of " pain as well.  No dizziness.  He had been snowblowing before all of this and does have chronic neck degenerative disc disease with limited motion so they thought that it was him straining something by looking backwards.  However, he did not really injure anything there and no specific event that really set it off.  Then that started after that later in the day and just has not given any relief now over the last 2 days.  He did feel a little better when he would lay flat on his back.  No recent illnesses.  He has had no fevers.  No cough, no nasal congestion at all.  He is a daily smoker along with hypertension, hyperlipidemia, obesity, chronic kidney disease stage III.  No recent travel or long trips at all.  No other complaints.    I have reviewed the patient's medical history in detail and updated the computerized patient record.        Right shoulder  right side chest right upper back pain   started about 2-3 days ago    laying flat on back is the only thing that helps the pain go away            Objective    /64 (BP Location: Right arm, Patient Position: Sitting, Cuff Size: Adult Regular)   Pulse 61   Temp 98.6  F (37  C) (Temporal)   Resp 18   Wt 113.8 kg (250 lb 12.8 oz)   SpO2 96%   BMI 34.98 kg/m    Body mass index is 34.98 kg/m .  Physical Exam   GENERAL: alert and no distress  RESP: lungs clear to auscultation - no rales, rhonchi or wheezes  CV: regular rate and rhythm, soft S1 S2, no S3 or S4, 2/6 systolic murmur throughout the precordium, no click or rub, no peripheral edema   MS: no gross musculoskeletal defects noted, no edema; no tenderness over the shoulder or right upper back.  He does have a little bit of tenderness over the lateral pectoralis muscle.  No increased pain with shoulder movement.            Signed Electronically by: Abdulkadir Hu MD

## 2024-12-23 NOTE — NURSING NOTE
"Chief Complaint   Patient presents with    Shoulder     Right shoulder, right side of chest right upper back   has relief when he lays down on back        Medication reconciliation completed.    FOOD SECURITY SCREENING QUESTIONS:    The next two questions are to help us understand your food security.  If you are feeling you need any assistance in this area, we have resources available to support you today.    Hunger Vital Signs:  Within the past 12 months we worried whether our food would run out before we got money to buy more. Never  Within the past 12 months the food we bought just didn't last and we didn't have money to get more. Never    Initial /64 (BP Location: Right arm, Patient Position: Sitting, Cuff Size: Adult Regular)   Pulse 61   Temp 98.6  F (37  C) (Temporal)   Resp 18   Wt 113.8 kg (250 lb 12.8 oz)   SpO2 96%   BMI 34.98 kg/m   Estimated body mass index is 34.98 kg/m  as calculated from the following:    Height as of 9/9/24: 1.803 m (5' 11\").    Weight as of this encounter: 113.8 kg (250 lb 12.8 oz).       Xochilt Kerns LPN .......  12/23/2024  9:11 AM    "

## 2024-12-26 LAB
ATRIAL RATE - MUSE: 62 BPM
DIASTOLIC BLOOD PRESSURE - MUSE: NORMAL MMHG
INTERPRETATION ECG - MUSE: NORMAL
P AXIS - MUSE: 24 DEGREES
PR INTERVAL - MUSE: 218 MS
QRS DURATION - MUSE: 150 MS
QT - MUSE: 452 MS
QTC - MUSE: 458 MS
R AXIS - MUSE: -33 DEGREES
SYSTOLIC BLOOD PRESSURE - MUSE: NORMAL MMHG
T AXIS - MUSE: 113 DEGREES
VENTRICULAR RATE- MUSE: 62 BPM

## 2025-01-06 ENCOUNTER — OFFICE VISIT (OUTPATIENT)
Dept: FAMILY MEDICINE | Facility: OTHER | Age: 80
End: 2025-01-06
Attending: FAMILY MEDICINE
Payer: COMMERCIAL

## 2025-01-06 VITALS
SYSTOLIC BLOOD PRESSURE: 110 MMHG | DIASTOLIC BLOOD PRESSURE: 70 MMHG | HEART RATE: 60 BPM | TEMPERATURE: 97.2 F | OXYGEN SATURATION: 96 % | RESPIRATION RATE: 14 BRPM | BODY MASS INDEX: 35.84 KG/M2 | WEIGHT: 257 LBS

## 2025-01-06 DIAGNOSIS — M54.2 NECK PAIN: ICD-10-CM

## 2025-01-06 DIAGNOSIS — M54.50 LUMBAR BACK PAIN: ICD-10-CM

## 2025-01-06 PROCEDURE — G0463 HOSPITAL OUTPT CLINIC VISIT: HCPCS

## 2025-01-06 RX ORDER — HYDROCODONE BITARTRATE AND ACETAMINOPHEN 7.5; 325 MG/1; MG/1
1 TABLET ORAL EVERY 4 HOURS PRN
Qty: 75 TABLET | Refills: 0 | Status: SHIPPED | OUTPATIENT
Start: 2025-01-06

## 2025-01-06 ASSESSMENT — ANXIETY QUESTIONNAIRES
7. FEELING AFRAID AS IF SOMETHING AWFUL MIGHT HAPPEN: NOT AT ALL
3. WORRYING TOO MUCH ABOUT DIFFERENT THINGS: NOT AT ALL
2. NOT BEING ABLE TO STOP OR CONTROL WORRYING: NOT AT ALL
1. FEELING NERVOUS, ANXIOUS, OR ON EDGE: NOT AT ALL
5. BEING SO RESTLESS THAT IT IS HARD TO SIT STILL: SEVERAL DAYS
6. BECOMING EASILY ANNOYED OR IRRITABLE: NOT AT ALL
GAD7 TOTAL SCORE: 1
GAD7 TOTAL SCORE: 1

## 2025-01-06 ASSESSMENT — PATIENT HEALTH QUESTIONNAIRE - PHQ9
SUM OF ALL RESPONSES TO PHQ QUESTIONS 1-9: 2
5. POOR APPETITE OR OVEREATING: NOT AT ALL

## 2025-01-06 ASSESSMENT — PAIN SCALES - GENERAL: PAINLEVEL_OUTOF10: NO PAIN (0)

## 2025-01-06 NOTE — NURSING NOTE
Patient presents for follow up from ER for his chest pain.  Laureen Wells LPN.........................1/6/2025  2:20 PM

## 2025-01-06 NOTE — PROGRESS NOTES
"  Assessment & Plan     Lumbar back pain  Med refill.  Currently doing well.  No signs of diversion or abuse  - HYDROcodone-acetaminophen (NORCO) 7.5-325 MG per tablet; Take 1 tablet by mouth every 4 hours as needed for severe pain.    Neck pain  Medication refill.  Currently doing well.  No signs of diversion or abuse  - HYDROcodone-acetaminophen (NORCO) 7.5-325 MG per tablet; Take 1 tablet by mouth every 4 hours as needed for severe pain.    Medications refilled    MED REC REQUIRED  Post Medication Reconciliation Status:  Discharge medications reconciled, continue medications without change  BMI  Estimated body mass index is 35.84 kg/m  as calculated from the following:    Height as of 12/23/24: 1.803 m (5' 11\").    Weight as of this encounter: 116.6 kg (257 lb).             No follow-ups on file.    Maik Adams is a 79 year old, presenting for the following health issues:  Clinic Care Coordination - Follow-up (ER follow up)      1/6/2025     2:19 PM   Additional Questions   Roomed by Laureen Wells LPN     Patient arrives here for follow-up here for ER visit on 1223.  He was seen in the ER for chest pain.  Felt to be due to muscular skeletal in origin.  He had been snowblowing and doing a lot of twisting.  Doing much better.  The pain is slowly resolving.  Patient also is requesting a refill on his hydrocodone which he uses on a sparing basis.  Last prescription for 45 tablets were given on July 1.  Review of the chart shows patient is up-to-date on his bladder and urine drug testing.  He is in need of PHQ-9 and GIA-7                       Objective    /70 (BP Location: Right arm)   Pulse 60   Temp 97.2  F (36.2  C) (Tympanic)   Resp 14   Wt 116.6 kg (257 lb)   SpO2 96%   BMI 35.84 kg/m    Body mass index is 35.84 kg/m .  Physical Exam   GENERAL: alert and no distress  NECK: no adenopathy, no asymmetry, masses, or scars  RESP: lungs clear to auscultation - no rales, rhonchi or wheezes  CV: " regular rate and rhythm, normal S1 S2, no S3 or S4, no murmur, click or rub, no peripheral edema  ABDOMEN: soft, nontender, no hepatosplenomegaly, no masses and bowel sounds normal  MS: no gross musculoskeletal defects noted, no edema    The longitudinal plan of care for the diagnosis(es)/condition(s) as documented were addressed during this visit. Due to the added complexity in care, I will continue to support Bryan in the subsequent management and with ongoing continuity of care.          Signed Electronically by: Abundio Abdi MD

## 2025-01-07 ENCOUNTER — TELEPHONE (OUTPATIENT)
Dept: FAMILY MEDICINE | Facility: OTHER | Age: 80
End: 2025-01-07
Payer: COMMERCIAL

## 2025-01-07 DIAGNOSIS — M54.50 LUMBAR BACK PAIN: ICD-10-CM

## 2025-01-07 DIAGNOSIS — M54.2 NECK PAIN: ICD-10-CM

## 2025-01-07 NOTE — TELEPHONE ENCOUNTER
Left message for patient to return call.  Laureen Augustine LPN on 1/7/2025 at 2:13 PM  EXT. [phone]

## 2025-01-07 NOTE — TELEPHONE ENCOUNTER
Patient phone went dead and the call was dropped.  Please call [phone].    THank you   /Fely Stein on 1/7/2025 at 2:22 PM

## 2025-01-07 NOTE — TELEPHONE ENCOUNTER
Pharmacy states they are only able to fill 42 out of 75 for the narco. If  Wants patient to have all 75 he will need to send a new script    Ayo Ch on 1/7/2025 at 9:39 AM

## 2025-01-07 NOTE — TELEPHONE ENCOUNTER
Left message for patient to return call.\  Laureen Augustine LPN on 1/7/2025 at 4:06 PM  EXT. [phone]

## 2025-01-08 NOTE — TELEPHONE ENCOUNTER
Pharmacy reports that the insurance is limiting the day supply and only dispensed 7 days worth.  This is what his insurance had allowed for a awhile.    Patient reports that he doesn't take the pain medication very often and should be ok with current dispense amount.      Cecille Kim LPN 1/8/2025 10:49 AM

## 2025-01-20 DIAGNOSIS — I10 ESSENTIAL HYPERTENSION: ICD-10-CM

## 2025-01-22 RX ORDER — HYDROCHLOROTHIAZIDE 25 MG/1
25 TABLET ORAL DAILY
Qty: 90 TABLET | Refills: 1 | Status: SHIPPED | OUTPATIENT
Start: 2025-01-22

## 2025-01-22 NOTE — TELEPHONE ENCOUNTER
[facility] sent Rx request for the following:      Requested Prescriptions   Pending Prescriptions Disp Refills    hydrochlorothiazide (HYDRODIURIL) 25 MG tablet [Pharmacy Med Name: hydroCHLOROthiazide 25 MG Oral Tablet (Hydrodiuril)] 90 tablet 2     Sig: Take 1 tablet (25 mg) by mouth daily       Diuretics (Including Combos) Protocol Failed - 1/22/2025  8:37 AM        Failed - Has GFR on file in past 12 months and most recent value is normal        Passed - Most recent blood pressure under 140/90 in past 12 months     BP Readings from Last 3 Encounters:   01/06/25 110/70   12/23/24 (!) 152/85   12/23/24 130/64       No data recorded         Last Prescription Date:   4/10/24  Last Fill Qty/Refills:         90, R-2    Last Office Visit:              7/1/24 (annual)   Future Office visit:            None    Unable to complete prescription refill per RN Medication Refill Policy.     Bi Cunha RN on 1/22/2025 at 8:38 AM

## 2025-02-13 ENCOUNTER — THERAPY VISIT (OUTPATIENT)
Dept: PHYSICAL THERAPY | Facility: OTHER | Age: 80
End: 2025-02-13
Attending: FAMILY MEDICINE
Payer: COMMERCIAL

## 2025-02-13 DIAGNOSIS — R07.9 CHEST PAIN, UNSPECIFIED TYPE: ICD-10-CM

## 2025-02-13 PROCEDURE — 97110 THERAPEUTIC EXERCISES: CPT | Mod: GP

## 2025-02-13 PROCEDURE — 97161 PT EVAL LOW COMPLEX 20 MIN: CPT | Mod: GP

## 2025-02-13 PROCEDURE — 97112 NEUROMUSCULAR REEDUCATION: CPT | Mod: GP

## 2025-02-18 ENCOUNTER — THERAPY VISIT (OUTPATIENT)
Dept: PHYSICAL THERAPY | Facility: OTHER | Age: 80
End: 2025-02-18
Attending: FAMILY MEDICINE
Payer: COMMERCIAL

## 2025-02-18 DIAGNOSIS — R07.9 CHEST PAIN, UNSPECIFIED TYPE: Primary | ICD-10-CM

## 2025-02-18 PROCEDURE — 97110 THERAPEUTIC EXERCISES: CPT | Mod: GP

## 2025-02-18 PROCEDURE — 97112 NEUROMUSCULAR REEDUCATION: CPT | Mod: GP

## 2025-02-19 DIAGNOSIS — I10 ESSENTIAL HYPERTENSION: ICD-10-CM

## 2025-02-19 DIAGNOSIS — I10 PRIMARY HYPERTENSION: ICD-10-CM

## 2025-02-20 ENCOUNTER — THERAPY VISIT (OUTPATIENT)
Dept: PHYSICAL THERAPY | Facility: OTHER | Age: 80
End: 2025-02-20
Attending: FAMILY MEDICINE
Payer: COMMERCIAL

## 2025-02-20 DIAGNOSIS — R07.9 CHEST PAIN, UNSPECIFIED TYPE: Primary | ICD-10-CM

## 2025-02-20 PROCEDURE — 97112 NEUROMUSCULAR REEDUCATION: CPT | Mod: GP

## 2025-02-20 PROCEDURE — 97110 THERAPEUTIC EXERCISES: CPT | Mod: GP

## 2025-02-20 RX ORDER — ATENOLOL 50 MG/1
TABLET ORAL
Qty: 90 TABLET | Refills: 0 | Status: SHIPPED | OUTPATIENT
Start: 2025-02-20

## 2025-02-20 RX ORDER — AMLODIPINE BESYLATE 10 MG/1
10 TABLET ORAL DAILY
Qty: 90 TABLET | Refills: 0 | Status: SHIPPED | OUTPATIENT
Start: 2025-02-20

## 2025-02-20 NOTE — TELEPHONE ENCOUNTER
[facility] sent Rx request for the following:      Requested Prescriptions   Pending Prescriptions Disp Refills    atenolol (TENORMIN) 50 MG tablet [Pharmacy Med Name: Atenolol 50 MG Oral Tablet (Tenormin)] 90 tablet 0     Sig: Take 1 Tablet by mouth one time a day.   Last Prescription Date:   11/18/24  Last Fill Qty/Refills:         90, R-0        amLODIPine (NORVASC) 10 MG tablet [Pharmacy Med Name: amLODIPine Besylate 10 MG Oral Tablet (Norvasc)] 90 tablet 0     Sig: Take 1 Tablet by mouth one time a day.   Last Prescription Date:   11/18/24  Last Fill Qty/Refills:         90, R-0      Calcium Channel Blockers Protocol  Failed - 2/20/2025 10:32 AM        Failed - GFR is on file in the past 12 months and most recent GFR is normal     Last Office Visit:                1/6/25 7/1/24 (Px)    Future Office visit:          None    Unable to complete prescription refill per RN Medication Refill Policy. Frieda Hobbs RN .............. 2/20/2025  10:42 AM

## 2025-02-27 ENCOUNTER — THERAPY VISIT (OUTPATIENT)
Dept: PHYSICAL THERAPY | Facility: OTHER | Age: 80
End: 2025-02-27
Attending: FAMILY MEDICINE
Payer: COMMERCIAL

## 2025-02-27 DIAGNOSIS — R07.9 CHEST PAIN, UNSPECIFIED TYPE: Primary | ICD-10-CM

## 2025-02-27 PROCEDURE — 97140 MANUAL THERAPY 1/> REGIONS: CPT | Mod: GP

## 2025-02-27 PROCEDURE — 97112 NEUROMUSCULAR REEDUCATION: CPT | Mod: GP

## 2025-03-06 ENCOUNTER — THERAPY VISIT (OUTPATIENT)
Dept: PHYSICAL THERAPY | Facility: OTHER | Age: 80
End: 2025-03-06
Attending: FAMILY MEDICINE
Payer: COMMERCIAL

## 2025-03-06 DIAGNOSIS — R07.9 CHEST PAIN, UNSPECIFIED TYPE: Primary | ICD-10-CM

## 2025-03-06 PROCEDURE — 97140 MANUAL THERAPY 1/> REGIONS: CPT | Mod: GP

## 2025-04-28 DIAGNOSIS — M10.00 IDIOPATHIC GOUT, UNSPECIFIED SITE: ICD-10-CM

## 2025-04-29 RX ORDER — ALLOPURINOL 300 MG/1
1 TABLET ORAL DAILY
Qty: 90 TABLET | Refills: 0 | Status: SHIPPED | OUTPATIENT
Start: 2025-04-29

## 2025-04-29 NOTE — TELEPHONE ENCOUNTER
[facility] sent Rx request for the following:      Requested Prescriptions   Pending Prescriptions Disp Refills    allopurinol (ZYLOPRIM) 300 MG tablet [Pharmacy Med Name: Allopurinol 300 MG Oral Tablet (Zyloprim)] 90 tablet 2     Sig: Take 1 Tablet by mouth one time a day.       Gout Agents Protocol Failed - 4/29/2025  3:34 PM        Failed - Has Uric Acid on file in past 12 months and value is less than 6     Recent Labs   Lab Test 03/30/21  0930   URIC 9.6*     If level is 6mg/dL or greater, ok to refill one time and refer to provider.           Failed - Has GFR on file in past 12 months and most recent value is normal   Idiopathic gout, unspecified site [M10.00]     Last Prescription Date:   7/15/24  Last Fill Qty/Refills:         90, R-2    Last Office Visit:                7/1/24 (px)    10/27/21 (gout discussed)   Future Office visit:            None    Unable to complete prescription refill per RN Medication Refill Policy.     Bi Cunha RN on 4/29/2025 at 3:38 PM

## 2025-05-31 DIAGNOSIS — I10 PRIMARY HYPERTENSION: ICD-10-CM

## 2025-05-31 DIAGNOSIS — I10 ESSENTIAL HYPERTENSION: ICD-10-CM

## 2025-06-03 RX ORDER — AMLODIPINE BESYLATE 10 MG/1
10 TABLET ORAL DAILY
Qty: 90 TABLET | Refills: 0 | Status: SHIPPED | OUTPATIENT
Start: 2025-06-03

## 2025-06-03 RX ORDER — ATENOLOL 50 MG/1
50 TABLET ORAL
Qty: 90 TABLET | Refills: 0 | Status: SHIPPED | OUTPATIENT
Start: 2025-06-03

## 2025-06-03 NOTE — TELEPHONE ENCOUNTER
[facility] sent Rx request for the following:      Requested Prescriptions   Pending Prescriptions Disp Refills    atenolol (TENORMIN) 50 MG tablet [Pharmacy Med Name: Atenolol 50 MG Oral Tablet (Tenormin)] 90 tablet 0     Sig: Take 1 Tablet by mouth one time a day.       Beta-Blockers Protocol Passed - 6/3/2025  9:30 AM        Passed - Most recent blood pressure under 140/90 in past 12 months     BP Readings from Last 3 Encounters:   01/06/25 110/70   12/23/24 (!) 152/85   12/23/24 130/64       No data recorded       Essential hypertension [I10]     Last Prescription Date:   2/20/25  Last Fill Qty/Refills:         90, R-0        amLODIPine (NORVASC) 10 MG tablet [Pharmacy Med Name: amLODIPine Besylate 10 MG Oral Tablet (Norvasc)] 90 tablet 0     Sig: Take 1 Tablet by mouth one time a day.       Calcium Channel Blockers Protocol  Failed - 6/3/2025  9:30 AM        Failed - GFR is on file in the past 12 months and most recent GFR is normal        Passed - Most recent blood pressure under 140/90 in past 12 months     BP Readings from Last 3 Encounters:   01/06/25 110/70   12/23/24 (!) 152/85   12/23/24 130/64       No data recorded       Primary hypertension [I10]     Last Prescription Date:   2/20/25  Last Fill Qty/Refills:         90, R-0      Last Office Visit:              7/1/24 (px)    Future Office visit:            None    Unable to complete prescription refill per RN Medication Refill Policy.     Pt due for annual in July. Routing to provider for refill consideration. Routing to Unit scheduling pool, to assist Pt in scheduling appointment.     Bi Cunha, RN on 6/3/2025 at 9:33 AM

## 2025-06-03 NOTE — TELEPHONE ENCOUNTER
Left message with spuse for patient to call back and schedule appointment.  Urmila Hamilton on 6/3/2025 at 11:51 AM

## 2025-06-21 DIAGNOSIS — E78.00 HYPERCHOLESTEROLEMIA: ICD-10-CM

## 2025-06-24 RX ORDER — ATORVASTATIN CALCIUM 20 MG/1
20 TABLET, FILM COATED ORAL DAILY
Qty: 90 TABLET | Refills: 0 | Status: SHIPPED | OUTPATIENT
Start: 2025-06-24

## 2025-06-24 NOTE — TELEPHONE ENCOUNTER
Ingrid Alfonso sent Rx request for the following:      Requested Prescriptions   Pending Prescriptions Disp Refills    atorvastatin (LIPITOR) 20 MG tablet [Pharmacy Med Name: Atorvastatin Calcium 20 MG Oral Tablet (Lipitor)] 90 tablet 2     Sig: Take 1 Tab by mouth one time a day.          Last Prescription Date:   8/22/24  Last Fill Qty/Refills:         90, R-2    Last Office Visit:              7/1/24   Future Office visit:             Next 5 appointments (look out 90 days)      Jul 02, 2025 9:30 AM  (Arrive by 9:15 AM)  Annual Wellness Visit with Abundio Abdi MD  [facility] ([facility] [address]  [place] [id number] [phone]           Prescription approved per [facility] Refill Protocol.  Emilie Segundo RN on 6/24/2025 at 2:55 PM

## 2025-07-02 ENCOUNTER — RESULTS FOLLOW-UP (OUTPATIENT)
Dept: FAMILY MEDICINE | Facility: OTHER | Age: 80
End: 2025-07-02

## 2025-07-02 ENCOUNTER — OFFICE VISIT (OUTPATIENT)
Dept: FAMILY MEDICINE | Facility: OTHER | Age: 80
End: 2025-07-02
Attending: FAMILY MEDICINE
Payer: COMMERCIAL

## 2025-07-02 VITALS
RESPIRATION RATE: 20 BRPM | BODY MASS INDEX: 35.11 KG/M2 | TEMPERATURE: 97 F | SYSTOLIC BLOOD PRESSURE: 134 MMHG | OXYGEN SATURATION: 95 % | HEIGHT: 72 IN | WEIGHT: 259.2 LBS | DIASTOLIC BLOOD PRESSURE: 72 MMHG | HEART RATE: 92 BPM

## 2025-07-02 DIAGNOSIS — M1A.0720 IDIOPATHIC CHRONIC GOUT OF LEFT FOOT WITHOUT TOPHUS: Primary | ICD-10-CM

## 2025-07-02 DIAGNOSIS — N18.31 STAGE 3A CHRONIC KIDNEY DISEASE (H): ICD-10-CM

## 2025-07-02 DIAGNOSIS — M54.2 NECK PAIN: ICD-10-CM

## 2025-07-02 DIAGNOSIS — Z00.00 ENCOUNTER FOR MEDICARE ANNUAL WELLNESS EXAM: ICD-10-CM

## 2025-07-02 DIAGNOSIS — Z00.00 MEDICARE ANNUAL WELLNESS VISIT, SUBSEQUENT: ICD-10-CM

## 2025-07-02 DIAGNOSIS — I10 PRIMARY HYPERTENSION: ICD-10-CM

## 2025-07-02 DIAGNOSIS — E78.00 HYPERCHOLESTEROLEMIA: ICD-10-CM

## 2025-07-02 DIAGNOSIS — M54.50 LUMBAR BACK PAIN: ICD-10-CM

## 2025-07-02 PROBLEM — N18.30 CHRONIC KIDNEY DISEASE, STAGE 3 (H): Status: RESOLVED | Noted: 2021-01-25 | Resolved: 2025-07-02

## 2025-07-02 PROBLEM — R07.9 CHEST PAIN, UNSPECIFIED TYPE: Status: RESOLVED | Noted: 2025-02-13 | Resolved: 2025-07-02

## 2025-07-02 PROBLEM — E66.01 MORBID OBESITY (H): Status: RESOLVED | Noted: 2020-11-09 | Resolved: 2025-07-02

## 2025-07-02 PROBLEM — M25.552 HIP PAIN, LEFT: Status: RESOLVED | Noted: 2019-02-11 | Resolved: 2025-07-02

## 2025-07-02 LAB
ALBUMIN SERPL BCG-MCNC: 4 G/DL (ref 3.5–5.2)
ALP SERPL-CCNC: 75 U/L (ref 40–150)
ALT SERPL W P-5'-P-CCNC: 31 U/L (ref 0–70)
ANION GAP SERPL CALCULATED.3IONS-SCNC: 11 MMOL/L (ref 7–15)
AST SERPL W P-5'-P-CCNC: 26 U/L (ref 0–45)
BASOPHILS # BLD AUTO: 0 10E3/UL (ref 0–0.2)
BASOPHILS NFR BLD AUTO: 0 %
BILIRUB SERPL-MCNC: 0.6 MG/DL
BUN SERPL-MCNC: 21.8 MG/DL (ref 8–23)
CALCIUM SERPL-MCNC: 9.6 MG/DL (ref 8.8–10.4)
CHLORIDE SERPL-SCNC: 102 MMOL/L (ref 98–107)
CHOLEST SERPL-MCNC: 124 MG/DL
CREAT SERPL-MCNC: 1.18 MG/DL (ref 0.67–1.17)
CREAT UR-MCNC: 67.8 MG/DL
EGFRCR SERPLBLD CKD-EPI 2021: 62 ML/MIN/1.73M2
EOSINOPHIL # BLD AUTO: 0.3 10E3/UL (ref 0–0.7)
EOSINOPHIL NFR BLD AUTO: 4 %
ERYTHROCYTE [DISTWIDTH] IN BLOOD BY AUTOMATED COUNT: 13.2 % (ref 10–15)
FASTING STATUS PATIENT QL REPORTED: YES
FASTING STATUS PATIENT QL REPORTED: YES
GLUCOSE SERPL-MCNC: 116 MG/DL (ref 70–99)
HCO3 SERPL-SCNC: 26 MMOL/L (ref 22–29)
HCT VFR BLD AUTO: 46.2 % (ref 40–53)
HDLC SERPL-MCNC: 33 MG/DL
HGB BLD-MCNC: 16 G/DL (ref 13.3–17.7)
IMM GRANULOCYTES # BLD: 0 10E3/UL
IMM GRANULOCYTES NFR BLD: 0 %
LDLC SERPL CALC-MCNC: 65 MG/DL
LYMPHOCYTES # BLD AUTO: 2.3 10E3/UL (ref 0.8–5.3)
LYMPHOCYTES NFR BLD AUTO: 30 %
MAGNESIUM SERPL-MCNC: 1.7 MG/DL (ref 1.7–2.3)
MCH RBC QN AUTO: 32.7 PG (ref 26.5–33)
MCHC RBC AUTO-ENTMCNC: 34.6 G/DL (ref 31.5–36.5)
MCV RBC AUTO: 94 FL (ref 78–100)
MICROALBUMIN UR-MCNC: 118.4 MG/L
MICROALBUMIN/CREAT UR: 174.63 MG/G CR (ref 0–17)
MONOCYTES # BLD AUTO: 0.7 10E3/UL (ref 0–1.3)
MONOCYTES NFR BLD AUTO: 9 %
NEUTROPHILS # BLD AUTO: 4.3 10E3/UL (ref 1.6–8.3)
NEUTROPHILS NFR BLD AUTO: 57 %
NONHDLC SERPL-MCNC: 91 MG/DL
NRBC # BLD AUTO: 0 10E3/UL
NRBC BLD AUTO-RTO: 0 /100
PLATELET # BLD AUTO: 215 10E3/UL (ref 150–450)
POTASSIUM SERPL-SCNC: 4 MMOL/L (ref 3.4–5.3)
PROT SERPL-MCNC: 6.8 G/DL (ref 6.4–8.3)
RBC # BLD AUTO: 4.9 10E6/UL (ref 4.4–5.9)
SODIUM SERPL-SCNC: 139 MMOL/L (ref 135–145)
TRIGL SERPL-MCNC: 131 MG/DL
URATE SERPL-MCNC: 7.3 MG/DL (ref 3.4–7)
WBC # BLD AUTO: 7.6 10E3/UL (ref 4–11)

## 2025-07-02 PROCEDURE — 84550 ASSAY OF BLOOD/URIC ACID: CPT | Mod: ZL | Performed by: FAMILY MEDICINE

## 2025-07-02 PROCEDURE — 80061 LIPID PANEL: CPT | Mod: ZL | Performed by: FAMILY MEDICINE

## 2025-07-02 PROCEDURE — 83735 ASSAY OF MAGNESIUM: CPT | Mod: ZL | Performed by: FAMILY MEDICINE

## 2025-07-02 PROCEDURE — G0463 HOSPITAL OUTPT CLINIC VISIT: HCPCS

## 2025-07-02 PROCEDURE — 82570 ASSAY OF URINE CREATININE: CPT | Mod: ZL | Performed by: FAMILY MEDICINE

## 2025-07-02 PROCEDURE — 36415 COLL VENOUS BLD VENIPUNCTURE: CPT | Mod: ZL | Performed by: FAMILY MEDICINE

## 2025-07-02 PROCEDURE — 85025 COMPLETE CBC W/AUTO DIFF WBC: CPT | Mod: ZL | Performed by: FAMILY MEDICINE

## 2025-07-02 PROCEDURE — 82947 ASSAY GLUCOSE BLOOD QUANT: CPT | Mod: ZL | Performed by: FAMILY MEDICINE

## 2025-07-02 RX ORDER — INDOMETHACIN 50 MG/1
CAPSULE ORAL
Qty: 30 CAPSULE | Refills: 2 | Status: SHIPPED | OUTPATIENT
Start: 2025-07-02

## 2025-07-02 RX ORDER — HYDROCODONE BITARTRATE AND ACETAMINOPHEN 7.5; 325 MG/1; MG/1
1 TABLET ORAL EVERY 4 HOURS PRN
Qty: 75 TABLET | Refills: 0 | Status: SHIPPED | OUTPATIENT
Start: 2025-07-02

## 2025-07-02 RX ORDER — HYDROCHLOROTHIAZIDE 25 MG/1
25 TABLET ORAL DAILY
Qty: 90 TABLET | Refills: 4 | Status: SHIPPED | OUTPATIENT
Start: 2025-07-02

## 2025-07-02 RX ORDER — ALLOPURINOL 300 MG/1
1 TABLET ORAL DAILY
Qty: 90 TABLET | Refills: 4 | Status: SHIPPED | OUTPATIENT
Start: 2025-07-02

## 2025-07-02 RX ORDER — ATORVASTATIN CALCIUM 20 MG/1
20 TABLET, FILM COATED ORAL DAILY
Qty: 90 TABLET | Refills: 4 | Status: SHIPPED | OUTPATIENT
Start: 2025-07-02

## 2025-07-02 RX ORDER — ATENOLOL 50 MG/1
50 TABLET ORAL
Qty: 90 TABLET | Refills: 4 | Status: SHIPPED | OUTPATIENT
Start: 2025-07-02

## 2025-07-02 RX ORDER — AMLODIPINE BESYLATE 10 MG/1
10 TABLET ORAL DAILY
Qty: 90 TABLET | Refills: 4 | Status: SHIPPED | OUTPATIENT
Start: 2025-07-02

## 2025-07-02 SDOH — HEALTH STABILITY: PHYSICAL HEALTH: ON AVERAGE, HOW MANY MINUTES DO YOU ENGAGE IN EXERCISE AT THIS LEVEL?: 60 MIN

## 2025-07-02 SDOH — HEALTH STABILITY: PHYSICAL HEALTH: ON AVERAGE, HOW MANY DAYS PER WEEK DO YOU ENGAGE IN MODERATE TO STRENUOUS EXERCISE (LIKE A BRISK WALK)?: 7 DAYS

## 2025-07-02 ASSESSMENT — SOCIAL DETERMINANTS OF HEALTH (SDOH): HOW OFTEN DO YOU GET TOGETHER WITH FRIENDS OR RELATIVES?: MORE THAN THREE TIMES A WEEK

## 2025-07-02 ASSESSMENT — PAIN SCALES - GENERAL: PAINLEVEL_OUTOF10: NO PAIN (0)

## 2025-07-02 NOTE — NURSING NOTE
Patient here for annual wellness visit and medication refills. Concerns with leg cramps at night and up to the BR at night as well. Medication Reconciliation: complete.    Brigette Garnica LPN  7/2/2025 9:21 AM

## 2025-07-02 NOTE — PROGRESS NOTES
Blood Preventive Care Visit  [facility]  Abundio Abdi MD, Family Medicine  Jul 2, 2025      Assessment & Plan     Primary hypertension  Currently under good control  - amLODIPine (NORVASC) 10 MG tablet; Take 1 tablet (10 mg) by mouth daily.  - Magnesium; Future  - Comprehensive Metabolic Panel; Future  - CBC and Differential; Future  - Magnesium  - Comprehensive Metabolic Panel  - CBC and Differential    Hypercholesterolemia  Refill  - atorvastatin (LIPITOR) 20 MG tablet; Take 1 tablet (20 mg) by mouth daily.  - Lipid Profile; Future  - Lipid Profile    Lumbar back pain   appropriate.  Last refill of January.  - HYDROcodone-acetaminophen (NORCO) 7.5-325 MG per tablet; Take 1 tablet by mouth every 4 hours as needed for severe pain.  Continue to use the hydrocodone as needed  Neck pain    - HYDROcodone-acetaminophen (NORCO) 7.5-325 MG per tablet; Take 1 tablet by mouth every 4 hours as needed for severe pain.    Idiopathic chronic gout of left foot without tophus    - allopurinol (ZYLOPRIM) 300 MG tablet; Take 1 tablet (300 mg) by mouth daily.  - Uric acid; Future  - Uric acid    Medicare annual wellness visit, subsequent  Satisfactory.  Patient is on hearing aids.  Does see the optometrist on a regular basis  As far as his leg cramps I did recommend magnesium over-the-counter    Encounter for Medicare annual wellness exam  Patient is not interested in tobacco cessation program or any medications    Stage 3a chronic kidney disease (H)  Currently chronic kidney disease stage II  - Albumin Random Urine Quantitative with Creat Ratio; Future  - Albumin Random Urine Quantitative with Creat Ratio    Results for orders placed or performed in visit on 07/02/25   Magnesium     Status: Normal   Result Value Ref Range    Magnesium 1.7 1.7 - 2.3 mg/dL   Comprehensive Metabolic Panel     Status: Abnormal   Result Value Ref Range    Sodium 139 135 - 145 mmol/L    Potassium 4.0 3.4 - 5.3 mmol/L     Carbon Dioxide (CO2) 26 22 - 29 mmol/L    Anion Gap 11 7 - 15 mmol/L    Urea Nitrogen 21.8 8.0 - 23.0 mg/dL    Creatinine 1.18 (H) 0.67 - 1.17 mg/dL    GFR Estimate 62 >60 mL/min/1.73m2    Calcium 9.6 8.8 - 10.4 mg/dL    Chloride 102 98 - 107 mmol/L    Glucose 116 (H) 70 - 99 mg/dL    Alkaline Phosphatase 75 40 - 150 U/L    AST 26 0 - 45 U/L    ALT 31 0 - 70 U/L    Protein Total 6.8 6.4 - 8.3 g/dL    Albumin 4.0 3.5 - 5.2 g/dL    Bilirubin Total 0.6 <=1.2 mg/dL    Patient Fasting > 8hrs? Yes    Albumin Random Urine Quantitative with Creat Ratio     Status: Abnormal   Result Value Ref Range    Creatinine Urine mg/dL 67.8 mg/dL    Albumin Urine mg/L 118.4 mg/L    Albumin Urine mg/g Cr 174.63 (H) 0.00 - 17.00 mg/g Cr   Uric acid     Status: Abnormal   Result Value Ref Range    Uric Acid 7.3 (H) 3.4 - 7.0 mg/dL   Lipid Profile     Status: Abnormal   Result Value Ref Range    Cholesterol 124 <200 mg/dL    Triglycerides 131 <150 mg/dL    Direct Measure HDL 33 (L) >=40 mg/dL    LDL Cholesterol Calculated 65 <100 mg/dL    Non HDL Cholesterol 91 <130 mg/dL    Patient Fasting > 8hrs? Yes     Narrative    Cholesterol  Desirable: < 200 mg/dL  Borderline High: 200 - 239 mg/dL  High: >= 240 mg/dL    Triglycerides  Normal: < 150 mg/dL  Borderline High: 150 - 199 mg/dL  High: 200-499 mg/dL  Very High: >= 500 mg/dL    Direct Measure HDL  Female: >= 50 mg/dL   Male: >= 40 mg/dL    LDL Cholesterol  Desirable: < 100 mg/dL  Above Desirable: 100 - 129 mg/dL   Borderline High: 130 - 159 mg/dL   High:  160 - 189 mg/dL   Very High: >= 190 mg/dL    Non HDL Cholesterol  Desirable: < 130 mg/dL  Above Desirable: 130 - 159 mg/dL  Borderline High: 160 - 189 mg/dL  High: 190 - 219 mg/dL  Very High: >= 220 mg/dL   CBC with platelets and differential     Status: None   Result Value Ref Range    WBC Count 7.6 4.0 - 11.0 10e3/uL    RBC Count 4.90 4.40 - 5.90 10e6/uL    Hemoglobin 16.0 13.3 - 17.7 g/dL    Hematocrit 46.2 40.0 - 53.0 %    MCV 94 78 -  "100 fL    MCH 32.7 26.5 - 33.0 pg    MCHC 34.6 31.5 - 36.5 g/dL    RDW 13.2 10.0 - 15.0 %    Platelet Count 215 150 - 450 10e3/uL    % Neutrophils 57 %    % Lymphocytes 30 %    % Monocytes 9 %    % Eosinophils 4 %    % Basophils 0 %    % Immature Granulocytes 0 %    NRBCs per 100 WBC 0 <1 /100    Absolute Neutrophils 4.3 1.6 - 8.3 10e3/uL    Absolute Lymphocytes 2.3 0.8 - 5.3 10e3/uL    Absolute Monocytes 0.7 0.0 - 1.3 10e3/uL    Absolute Eosinophils 0.3 0.0 - 0.7 10e3/uL    Absolute Basophils 0.0 0.0 - 0.2 10e3/uL    Absolute Immature Granulocytes 0.0 <=0.4 10e3/uL    Absolute NRBCs 0.0 10e3/uL   CBC and Differential     Status: None    Narrative    The following orders were created for panel order CBC and Differential.  Procedure                               Abnormality         Status                     ---------                               -----------         ------                     CBC with platelets and ...[[id number]]                      Final result                 Please view results for these tests on the individual orders.             Nicotine/Tobacco Cessation  He reports that he has been smoking cigarettes. He started smoking about 30 years ago. He has a 25 pack-year smoking history. He has never used smokeless tobacco.  Nicotine/Tobacco Cessation Plan  Information offered: Patient not interested at this time      BMI  Estimated body mass index is 35.65 kg/m  as calculated from the following:    Height as of this encounter: 1.816 m (5' 11.5\").    Weight as of this encounter: 117.6 kg (259 lb 3.2 oz).       Counseling  Appropriate preventive services were addressed with this patient via screening, questionnaire, or discussion as appropriate for fall prevention, nutrition, physical activity, Tobacco-use cessation, social engagement, weight loss and cognition.  Checklist reviewing preventive services available has been given to the patient.  Reviewed patient's diet, addressing concerns and/or " questions.   The patient was instructed to see the dentist every 6 months.   The patient was provided with written information regarding signs of hearing loss.   I have reviewed Opioid Use Disorder and Substance Use Disorder risk factors and made any needed referrals. I have reviewed the current pain treatment plan including non-opioid treatment options.            Maik Adams is a 79 year old, presenting for the following:  Wellness Visit           History of Present Illness       Reason for visit:  Wellness visitHe exercises with enough effort to increase his heart rate 30 to 60 minutes per day.  He exercises with enough effort to increase his heart rate 7 days per week.   He is taking medications regularly.    Patient arrives here for Medicare wellness exam also for medication renewal.  His only concern is leg cramps.  He gets leg cramps in the night.  Typically in the outer thigh.  No trauma that he is aware of.  Does not seem to worsen with activity and mainly at night.         Advance Care Planning    Discussed advance care planning with patient; however, patient declined at this time.        7/2/2025   General Health   How would you rate your overall physical health? (!) FAIR   Feel stress (tense, anxious, or unable to sleep) Not at all         7/2/2025   Nutrition   Diet: Regular (no restrictions)         7/2/2025   Exercise   Days per week of moderate/strenous exercise 7 days   Average minutes spent exercising at this level 60 min         7/2/2025   Social Factors   Frequency of gathering with friends or relatives More than three times a week   Worry food won't last until get money to buy more No   Food not last or not have enough money for food? No   Do you have housing? (Housing is defined as stable permanent housing and does not include staying outside in a car, in a tent, in an abandoned building, in an overnight shelter, or couch-surfing.) Yes   Are you worried about losing your housing? No    Lack of transportation? No   Unable to get utilities (heat,electricity)? No         7/2/2025   Fall Risk   Fallen 2 or more times in the past year? No   Trouble with walking or balance? No          7/2/2025   Activities of Daily Living- Home Safety   Needs help with the following daily activites None of the above   Safety concerns in the home None of the above         7/2/2025   Dental   Dentist two times every year? (!) NO         7/2/2025   Hearing Screening   Hearing concerns? (!) I FEEL THAT PEOPLE ARE MUMBLING OR NOT SPEAKING CLEARLY.    (!) I NEED TO ASK PEOPLE TO SPEAK UP OR REPEAT THEMSELVES.    (!) IT'S HARD TO FOLLOW A CONVERSATION IN A NOISY RESTAURANT OR CROWDED ROOM.   Would you like a referral for hearing testing? I already have hearing aids       Multiple values from one day are sorted in reverse-chronological order         7/2/2025   Driving Risk Screening   Patient/family members have concerns about driving No         7/2/2025   General Alertness/Fatigue Screening   Have you been more tired than usual lately? No         7/2/2025   Urinary Incontinence Screening   Bothered by leaking urine in past 6 months No         Today's PHQ-2 Score:       7/2/2025     9:09 AM   PHQ-2 ( 1999 Pf[employer])   Q1: Little interest or pleasure in doing things 0    Q2: Feeling down, depressed or hopeless 0    PHQ-2 Score 0    Q1: Little interest or pleasure in doing things Not at all   Q2: Feeling down, depressed or hopeless Not at all   PHQ-2 Score 0       Proxy-reported           7/2/2025   Substance Use   If I could quit smoking, I would Somewhat agree   I want to quit somking, worry about health affects Somewhat agree   Willing to make a plan to quit smoking Somewhat agree   Willing to cut down before quitting Somewhat agree   Alcohol more than 3/day or more than 7/wk No   Do you have a current opioid prescription? (!) YES   How severe/bad is pain from 1 to 10? 5/10   Do you use any other substances recreationally? No  "        2024    11:42 AM   OPIOID RISK TOOL TOTAL SCORE   Total Score 0     Low Risk (0-3)  Moderate Risk (4-7)  High Risk (>8)  Social History     Tobacco Use    Smoking status: Every Day     Current packs/day: 0.00     Average packs/day: 1 pack/day for 25.0 years (25.0 ttl pk-yrs)     Types: Cigarettes     Start date: 1995     Last attempt to quit: 2020     Years since quittin.3    Smokeless tobacco: Never   Vaping Use    Vaping status: Never Used   Substance Use Topics    Alcohol use: Not Currently     Alcohol/week: 2.0 standard drinks of alcohol     Types: 2 Cans of beer per week     Comment: 2 beer a week    Drug use: No                 Reviewed and updated as needed this visit by Provider                      Current providers sharing in care for this patient include:  Patient Care Team:  Abundio Abdi MD as PCP - General (Family Practice)  Abundio Abdi MD as Assigned PCP  Anaid Chapa NP as Assigned Surgical Provider    The following health maintenance items are reviewed in Epic and correct as of today:  Health Maintenance   Topic Date Due    ZOSTER VACCINE (1 of 2) Never done    URIC ACID  2022    LIPID  2023    MICROALBUMIN  2024    COVID-19 VACCINE (2024- season) 2025    NICOTINE/TOBACCO CESSATION COUNSELING Q 1 YR  2025    MEDICARE ANNUAL WELLNESS VISIT  2025    INFLUENZA VACCINE (1) 2025    BMP  2025    HEMOGLOBIN  2025    FALL RISK ASSESSMENT  2026    ADVANCE CARE PLANNING  2029    DTAP/TDAP/TD VACCINE (3 - Td or Tdap) 2033    PHQ-2 (once per calendar year)  Completed    PNEUMOCOCCAL VACCINE 50+ YEARS  Completed    URINALYSIS  Completed    RSV VACCINE  Completed    HPV VACCINE  Aged Out    MENINGITIS VACCINE  Aged Out            Objective    Exam  /72   Pulse 92   Temp 97  F (36.1  C)   Resp 20   Ht 1.816 m (5' 11.5\")   Wt 117.6 kg (259 lb 3.2 oz)   SpO2 95%   BMI 35.65 kg/m   " "  Estimated body mass index is 35.65 kg/m  as calculated from the following:    Height as of this encounter: 1.816 m (5' 11.5\").    Weight as of this encounter: 117.6 kg (259 lb 3.2 oz).    Physical Exam  GENERAL: alert and no distress  NECK: no adenopathy, no asymmetry, masses, or scars  RESP: lungs clear to auscultation - no rales, rhonchi or wheezes  CV: regular rate and rhythm, normal S1 S2, no S3 or S4, no murmur, click or rub, no peripheral edema  ABDOMEN: soft, nontender, no hepatosplenomegaly, no masses and bowel sounds normal  MS: no gross musculoskeletal defects noted, no edema        7/1/2024   Mini Cog   Clock Draw Score 0 Abnormal   3 Item Recall 3 objects recalled   Mini Cog Total Score 3              Signed Electronically by: Abundio Abdi MD    "

## 2025-07-02 NOTE — PATIENT INSTRUCTIONS
Patient Education   Preventive Care Advice   This is general advice given by our system to help you stay healthy. However, your care team may have specific advice just for you. Please talk to your care team about your preventive care needs.  Nutrition  Eat 5 or more servings of fruits and vegetables each day.  Try wheat bread, brown rice and whole grain pasta (instead of white bread, rice, and pasta).  Get enough calcium and vitamin D. Check the label on foods and aim for 100% of the RDA (recommended daily allowance).  Lifestyle  Exercise at least 150 minutes each week  (30 minutes a day, 5 days a week).  Do muscle strengthening activities 2 days a week. These help control your weight and prevent disease.  No smoking.  Wear sunscreen to prevent skin cancer.  Have a dental exam and cleaning every 6 months.  Yearly exams  See your health care team every year to talk about:  Any changes in your health.  Any medicines your care team has prescribed.  Preventive care, family planning, and ways to prevent chronic diseases.  Shots (vaccines)   HPV shots (up to age 26), if you've never had them before.  Hepatitis B shots (up to age 59), if you've never had them before.  COVID-19 shot: Get this shot when it's due.  Flu shot: Get a flu shot every year.  Tetanus shot: Get a tetanus shot every 10 years.  Pneumococcal, hepatitis A, and RSV shots: Ask your care team if you need these based on your risk.  Shingles shot (for age 50 and up)  General health tests  Diabetes screening:  Starting at age 35, Get screened for diabetes at least every 3 years.  If you are younger than age 35, ask your care team if you should be screened for diabetes.  Cholesterol test: At age 39, start having a cholesterol test every 5 years, or more often if advised.  Bone density scan (DEXA): At age 50, ask your care team if you should have this scan for osteoporosis (brittle bones).  Hepatitis C: Get tested at least once in your life.  STIs (sexually  transmitted infections)  Before age 24: Ask your care team if you should be screened for STIs.  After age 24: Get screened for STIs if you're at risk. You are at risk for STIs (including HIV) if:  You are sexually active with more than one person.  You don't use condoms every time.  You or a partner was diagnosed with a sexually transmitted infection.  If you are at risk for HIV, ask about PrEP medicine to prevent HIV.  Get tested for HIV at least once in your life, whether you are at risk for HIV or not.  Cancer screening tests  Cervical cancer screening: If you have a cervix, begin getting regular cervical cancer screening tests starting at age 21.  Breast cancer scan (mammogram): If you've ever had breasts, begin having regular mammograms starting at age 40. This is a scan to check for breast cancer.  Colon cancer screening: It is important to start screening for colon cancer at age 45.  Have a colonoscopy test every 10 years (or more often if you're at risk) Or, ask your provider about stool tests like a FIT test every year or Cologuard test every 3 years.  To learn more about your testing options, visit:   .  For help making a decision, visit:   https://bit.ly/tm20256.  Prostate cancer screening test: If you have a prostate, ask your care team if a prostate cancer screening test (PSA) at age 55 is right for you.  Lung cancer screening: If you are a current or former smoker ages 50 to 80, ask your care team if ongoing lung cancer screenings are right for you.  For informational purposes only. Not to replace the advice of your health care provider. Copyright   2023 [facility]. All rights reserved. Clinically reviewed by the [facility]. [employer] 164114 - REV 01/24.  Hearing Loss: Care Instructions  Overview     Hearing loss is a sudden or slow decrease in how well you hear. It can range from slight to profound. Permanent hearing loss can occur with aging. It also can  happen when you are exposed long-term to loud noise. Examples include listening to loud music, riding motorcycles, or being around other loud machines.  Hearing loss can affect your work and home life. It can make you feel lonely or depressed. You may feel that you have lost your independence. But hearing aids and other devices can help you hear better and feel connected to others.  Follow-up care is a key part of your treatment and safety. Be sure to make and go to all appointments, and call your doctor if you are having problems. It's also a good idea to know your test results and keep a list of the medicines you take.  How can you care for yourself at home?  Avoid loud noises whenever possible. This helps keep your hearing from getting worse.  Always wear hearing protection around loud noises.  Wear a hearing aid as directed.  A professional can help you pick a hearing aid that will work best for you.  You can also get hearing aids over the counter for mild to moderate hearing loss.  Have hearing tests as your doctor suggests. They can show whether your hearing has changed. Your hearing aid may need to be adjusted.  Use other devices as needed. These may include:  Telephone amplifiers and hearing aids that can connect to a television, stereo, radio, or microphone.  Devices that use lights or vibrations. These alert you to the doorbell, a ringing telephone, or a baby monitor.  Television closed-captioning. This shows the words at the bottom of the screen. Most new TVs can do this.  TTY (text telephone). This lets you type messages back and forth on the telephone instead of talking or listening. These devices are also called TDD. When messages are typed on the keyboard, they are sent over the phone line to a receiving TTY. The message is shown on a monitor.  Use text messaging, social media, and email if it is hard for you to communicate by telephone.  Try to learn a listening technique called speechreading. It is  "not lipreading. You pay attention to people's gestures, expressions, posture, and tone of voice. These clues can help you understand what a person is saying. Face the person you are talking to, and have them face you. Make sure the lighting is good. You need to see the other person's face clearly.  Think about counseling if you need help to adjust to your hearing loss.  When should you call for help?  Watch closely for changes in your health, and be sure to contact your doctor if:    You think your hearing is getting worse.     You have new symptoms, such as dizziness or nausea.   Where can you learn more?  Go to https://www.Ksplice.net/patiented  Enter R798 in the search box to learn more about \"Hearing Loss: Care Instructions.\"  Current as of: October 27, 2024  Content Version: 14.5    6634-7502 [employer].   Care instructions adapted under license by your healthcare professional. If you have questions about a medical condition or this instruction, always ask your healthcare professional. [employer] disclaims any warranty or liability for your use of this information.    Chronic Pain: Care Instructions  Your Care Instructions     Chronic pain is pain that lasts a long time (months or even years) and may or may not have a clear cause. It is different from acute pain, which usually does have a clear cause--like an injury or illness--and gets better over time. Chronic pain:  Lasts over time but may vary from day to day.  Does not go away despite efforts to end it.  May disrupt your sleep and lead to fatigue.  May cause depression or anxiety.  May make your muscles tense, causing more pain.  Can disrupt your work, hobbies, home life, and relationships with friends and family.  Chronic pain is a very real condition. It is not just in your head. Treatment can help and usually includes several methods used together, such as medicines, physical therapy, exercise, and other treatments. Learning " how to relax and changing negative thought patterns can also help you cope.  Chronic pain is complex. Taking an active role in your treatment will help you better manage your pain. Tell your doctor if you have trouble dealing with your pain. You may have to try several things before you find what works best for you.  Follow-up care is a key part of your treatment and safety. Be sure to make and go to all appointments, and call your doctor if you are having problems. It's also a good idea to know your test results and keep a list of the medicines you take.  How can you care for yourself at home?  Pace yourself. Break up large jobs into smaller tasks. Save harder tasks for days when you have less pain, or go back and forth between hard tasks and easier ones. Take rest breaks.  Relax, and reduce stress. Relaxation techniques such as deep breathing or meditation can help.  Keep moving. Gentle, daily exercise can help reduce pain over the long run. Try low- or no-impact exercises such as walking, swimming, and stationary biking. Do stretches to stay flexible.  Try heat, cold packs, and massage.  Get enough sleep. Chronic pain can make you tired and drain your energy. Talk with your doctor if you have trouble sleeping because of pain.  Think positive. Your thoughts can affect your pain level. Do things that you enjoy to distract yourself when you have pain instead of focusing on the pain. See a movie, read a book, listen to music, or spend time with a friend.  If you think you are depressed, talk to your doctor about treatment.  Keep a daily pain diary. Record how your moods, thoughts, sleep patterns, activities, and medicine affect your pain. You may find that your pain is worse during or after certain activities or when you are feeling a certain emotion. Having a record of your pain can help you and your doctor find the best ways to treat your pain.  Take pain medicines exactly as directed.  If the doctor gave you a  "prescription medicine for pain, take it as prescribed.  If you are not taking a prescription pain medicine, ask your doctor if you can take an over-the-counter medicine.  Reducing constipation caused by pain medicine  Talk to your doctor about a laxative. If a laxative doesn't work, your doctor may suggest a prescription medicine.  Include fruits, vegetables, beans, and whole grains in your diet each day. These foods are high in fiber.  If your doctor recommends it, get more exercise. Walking is a good choice. Bit by bit, increase the amount you walk every day. Try for at least 30 minutes on most days of the week.  Schedule time each day for a bowel movement. A daily routine may help. Take your time and do not strain when having a bowel movement.  When should you call for help?   Call your doctor now or seek immediate medical care if:    Your pain gets worse or is out of control.     You feel down or blue, or you do not enjoy things like you once did. You may be depressed, which is common in people with chronic pain. Depression can be treated.     You have vomiting or cramps for more than 2 hours.   Watch closely for changes in your health, and be sure to contact your doctor if:    You cannot sleep because of pain.     You are very worried or anxious about your pain.     You have trouble taking your pain medicine.     You have any concerns about your pain medicine.     You have trouble with bowel movements, such as:  No bowel movement in 3 days.  Blood in the anal area, in your stool, or on the toilet paper.  Diarrhea for more than 24 hours.   Where can you learn more?  Go to https://www.[employer]Relevance.net/patiented  Enter N004 in the search box to learn more about \"Chronic Pain: Care Instructions.\"  Current as of: July 31, 2024  Content Version: 14.5    7396-1853 [facility] [employer].   Care instructions adapted under license by your healthcare professional. If you have questions about a medical condition or this " instruction, always ask your healthcare professional. [employer], [facility] disclaims any warranty or liability for your use of this information.

## 2025-07-28 DIAGNOSIS — R35.0 BENIGN PROSTATIC HYPERPLASIA WITH URINARY FREQUENCY: ICD-10-CM

## 2025-07-28 DIAGNOSIS — N40.1 BENIGN PROSTATIC HYPERPLASIA WITH URINARY FREQUENCY: ICD-10-CM

## 2025-07-31 RX ORDER — TAMSULOSIN HYDROCHLORIDE 0.4 MG/1
0.4 CAPSULE ORAL DAILY
Qty: 90 CAPSULE | Refills: 3 | Status: SHIPPED | OUTPATIENT
Start: 2025-07-31

## 2025-07-31 NOTE — TELEPHONE ENCOUNTER
[facility] sent Rx request for the following:      Requested Prescriptions   Pending Prescriptions Disp Refills    tamsulosin (FLOMAX) 0.4 MG capsule [Pharmacy Med Name: Tamsulosin HCl 0.4 MG Oral Capsule (Flomax)] 90 capsule 2     Sig: Take 1 Capsule by mouth one time a day.   Last Prescription Date:   10/10/24  Last Fill Qty/Refills:         90, R-2    Last Office Visit:              7/2/25 (Px)   Future Office visit:           None    Prescription refilled per RN Medication Refill Policy.................... Frieda Hobbs RN ....................  7/31/2025   4:05 PM   [Follow-Up] : a follow-up visit [Family Member] : family member [Abnormal CXR/ Chest CT] : an abnormal CXR/ chest CT [TextBox_[id number]] : MAC

## 2025-08-27 RX ORDER — HYDROCHLOROTHIAZIDE 25 MG/1
25 TABLET ORAL DAILY
Qty: 90 TABLET | Refills: 1 | OUTPATIENT
Start: 2025-08-27

## (undated) RX ORDER — DEXAMETHASONE SODIUM PHOSPHATE 10 MG/ML
INJECTION, SOLUTION INTRAMUSCULAR; INTRAVENOUS
Status: DISPENSED
Start: 2021-09-03

## (undated) RX ORDER — SILVER SULFADIAZINE 10 MG/G
CREAM TOPICAL
Status: DISPENSED
Start: 2018-09-05

## (undated) RX ORDER — LIDOCAINE HYDROCHLORIDE 10 MG/ML
INJECTION, SOLUTION EPIDURAL; INFILTRATION; INTRACAUDAL; PERINEURAL
Status: DISPENSED
Start: 2021-09-03

## (undated) RX ORDER — LIDOCAINE HYDROCHLORIDE AND EPINEPHRINE 10; 10 MG/ML; UG/ML
INJECTION, SOLUTION INFILTRATION; PERINEURAL
Status: DISPENSED
Start: 2020-11-26

## (undated) RX ORDER — SODIUM CHLORIDE 9 MG/ML
INJECTION, SOLUTION INTRAVENOUS
Status: DISPENSED
Start: 2020-11-26

## (undated) RX ORDER — HYDROMORPHONE HYDROCHLORIDE 1 MG/ML
INJECTION, SOLUTION INTRAMUSCULAR; INTRAVENOUS; SUBCUTANEOUS
Status: DISPENSED
Start: 2020-11-26

## (undated) RX ORDER — LIDOCAINE HYDROCHLORIDE 10 MG/ML
INJECTION, SOLUTION INFILTRATION; PERINEURAL
Status: DISPENSED
Start: 2021-09-03

## (undated) RX ORDER — ACETAMINOPHEN 500 MG
TABLET ORAL
Status: DISPENSED
Start: 2020-11-26

## (undated) RX ORDER — ASPIRIN 81 MG/1
TABLET, CHEWABLE ORAL
Status: DISPENSED
Start: 2024-12-23

## (undated) RX ORDER — KETOROLAC TROMETHAMINE 30 MG/ML
INJECTION, SOLUTION INTRAMUSCULAR; INTRAVENOUS
Status: DISPENSED
Start: 2020-03-22

## (undated) RX ORDER — CYCLOBENZAPRINE HCL 10 MG
TABLET ORAL
Status: DISPENSED
Start: 2020-03-22

## (undated) RX ORDER — FENTANYL CITRATE 50 UG/ML
INJECTION, SOLUTION INTRAMUSCULAR; INTRAVENOUS
Status: DISPENSED
Start: 2020-03-22